# Patient Record
Sex: FEMALE | Race: WHITE | NOT HISPANIC OR LATINO | Employment: OTHER | URBAN - METROPOLITAN AREA
[De-identification: names, ages, dates, MRNs, and addresses within clinical notes are randomized per-mention and may not be internally consistent; named-entity substitution may affect disease eponyms.]

---

## 2017-10-10 ENCOUNTER — APPOINTMENT (OUTPATIENT)
Dept: OTHER | Facility: HOSPITAL | Age: 67
End: 2017-10-10
Attending: INTERNAL MEDICINE

## 2017-10-10 ENCOUNTER — APPOINTMENT (OUTPATIENT)
Dept: GENERAL RADIOLOGY | Facility: HOSPITAL | Age: 67
End: 2017-10-10

## 2017-10-10 ENCOUNTER — TRANSCRIBE ORDERS (OUTPATIENT)
Dept: PULMONOLOGY | Facility: CLINIC | Age: 67
End: 2017-10-10

## 2017-10-10 ENCOUNTER — HOSPITAL ENCOUNTER (OUTPATIENT)
Facility: HOSPITAL | Age: 67
Setting detail: SURGERY ADMIT
Discharge: HOME | End: 2017-10-10
Attending: INTERNAL MEDICINE | Admitting: INTERNAL MEDICINE

## 2017-10-10 ENCOUNTER — OFFICE VISIT (OUTPATIENT)
Dept: PULMONOLOGY | Facility: CLINIC | Age: 67
End: 2017-10-10

## 2017-10-10 VITALS
SYSTOLIC BLOOD PRESSURE: 136 MMHG | BODY MASS INDEX: 23.57 KG/M2 | WEIGHT: 133 LBS | HEART RATE: 60 BPM | DIASTOLIC BLOOD PRESSURE: 73 MMHG | TEMPERATURE: 97.6 F | OXYGEN SATURATION: 99 % | RESPIRATION RATE: 16 BRPM | HEIGHT: 63 IN

## 2017-10-10 VITALS
HEIGHT: 64 IN | RESPIRATION RATE: 16 BRPM | SYSTOLIC BLOOD PRESSURE: 110 MMHG | DIASTOLIC BLOOD PRESSURE: 80 MMHG | HEART RATE: 69 BPM | OXYGEN SATURATION: 96 % | BODY MASS INDEX: 22.84 KG/M2 | TEMPERATURE: 98.2 F | WEIGHT: 133.8 LBS

## 2017-10-10 DIAGNOSIS — R04.2 HEMOPTYSIS: Primary | ICD-10-CM

## 2017-10-10 DIAGNOSIS — J43.2 CENTRILOBULAR EMPHYSEMA (HCC): Primary | ICD-10-CM

## 2017-10-10 DIAGNOSIS — R04.2 HEMOPTYSIS: ICD-10-CM

## 2017-10-10 DIAGNOSIS — J43.2 CENTRILOBULAR EMPHYSEMA (HCC): ICD-10-CM

## 2017-10-10 PROCEDURE — 31624 DX BRONCHOSCOPE/LAVAGE: CPT | Performed by: INTERNAL MEDICINE

## 2017-10-10 PROCEDURE — 25010000002 FENTANYL CITRATE (PF) 100 MCG/2ML SOLUTION: Performed by: INTERNAL MEDICINE

## 2017-10-10 PROCEDURE — 88112 CYTOPATH CELL ENHANCE TECH: CPT | Performed by: INTERNAL MEDICINE

## 2017-10-10 PROCEDURE — 87070 CULTURE OTHR SPECIMN AEROBIC: CPT | Performed by: INTERNAL MEDICINE

## 2017-10-10 PROCEDURE — 25010000002 MIDAZOLAM PER 1 MG: Performed by: INTERNAL MEDICINE

## 2017-10-10 PROCEDURE — 87449 NOS EACH ORGANISM AG IA: CPT | Performed by: INTERNAL MEDICINE

## 2017-10-10 PROCEDURE — 87205 SMEAR GRAM STAIN: CPT | Performed by: INTERNAL MEDICINE

## 2017-10-10 PROCEDURE — 88305 TISSUE EXAM BY PATHOLOGIST: CPT | Performed by: INTERNAL MEDICINE

## 2017-10-10 PROCEDURE — 87102 FUNGUS ISOLATION CULTURE: CPT | Performed by: INTERNAL MEDICINE

## 2017-10-10 PROCEDURE — 87206 SMEAR FLUORESCENT/ACID STAI: CPT | Performed by: INTERNAL MEDICINE

## 2017-10-10 PROCEDURE — 87116 MYCOBACTERIA CULTURE: CPT | Performed by: INTERNAL MEDICINE

## 2017-10-10 PROCEDURE — 99204 OFFICE O/P NEW MOD 45 MIN: CPT | Performed by: INTERNAL MEDICINE

## 2017-10-10 RX ORDER — MIDAZOLAM HYDROCHLORIDE 5 MG/ML
INJECTION INTRAMUSCULAR; INTRAVENOUS AS NEEDED
Status: COMPLETED | OUTPATIENT
Start: 2017-10-10 | End: 2017-10-10

## 2017-10-10 RX ORDER — TIZANIDINE HYDROCHLORIDE 2 MG/1
2 CAPSULE, GELATIN COATED ORAL 3 TIMES DAILY
Status: ON HOLD | COMMUNITY
End: 2017-10-10

## 2017-10-10 RX ORDER — DOXEPIN HYDROCHLORIDE 50 MG/1
50 CAPSULE ORAL NIGHTLY
Status: ON HOLD | COMMUNITY
End: 2017-10-10

## 2017-10-10 RX ORDER — DOXYCYCLINE HYCLATE 100 MG/1
100 CAPSULE ORAL DAILY
COMMUNITY
End: 2019-08-29

## 2017-10-10 RX ORDER — TELMISARTAN 40 MG/1
40 TABLET ORAL DAILY
COMMUNITY
Start: 2017-08-28

## 2017-10-10 RX ORDER — LEVOTHYROXINE SODIUM 100 MCG
100 TABLET ORAL DAILY
COMMUNITY
Start: 2017-07-16

## 2017-10-10 RX ORDER — TRAZODONE HYDROCHLORIDE 50 MG/1
50 TABLET ORAL AS NEEDED
COMMUNITY
Start: 2017-09-12

## 2017-10-10 RX ORDER — FENTANYL CITRATE 50 UG/ML
INJECTION, SOLUTION INTRAMUSCULAR; INTRAVENOUS AS NEEDED
Status: COMPLETED | OUTPATIENT
Start: 2017-10-10 | End: 2017-10-10

## 2017-10-10 RX ORDER — ERGOCALCIFEROL 1.25 MG/1
50000 CAPSULE ORAL WEEKLY
COMMUNITY

## 2017-10-10 RX ORDER — CYCLOSPORINE 0.5 MG/ML
1 EMULSION OPHTHALMIC EVERY 12 HOURS
COMMUNITY
Start: 2017-09-22 | End: 2018-06-21 | Stop reason: SINTOL

## 2017-10-10 RX ORDER — CONJUGATED ESTROGENS 0.62 MG/G
CREAM VAGINAL
COMMUNITY
Start: 2017-10-04

## 2017-10-10 RX ORDER — ONDANSETRON 4 MG/1
4 TABLET, FILM COATED ORAL EVERY 8 HOURS PRN
Status: ON HOLD | COMMUNITY
End: 2017-10-10

## 2017-10-10 RX ORDER — MONTELUKAST SODIUM 10 MG/1
TABLET ORAL
Status: ON HOLD | COMMUNITY
Start: 2017-08-28 | End: 2017-10-10

## 2017-10-10 RX ORDER — VALACYCLOVIR HYDROCHLORIDE 1 G/1
1000 TABLET, FILM COATED ORAL 2 TIMES DAILY
COMMUNITY

## 2017-10-10 RX ORDER — MECLIZINE HCL 12.5 MG/1
12.5 TABLET ORAL AS NEEDED
COMMUNITY

## 2017-10-10 RX ORDER — LIDOCAINE 50 MG/G
1 PATCH TOPICAL EVERY 24 HOURS
Status: ON HOLD | COMMUNITY
End: 2017-10-10

## 2017-10-10 RX ORDER — PANTOPRAZOLE SODIUM 40 MG/1
40 TABLET, DELAYED RELEASE ORAL DAILY
COMMUNITY

## 2017-10-10 RX ADMIN — MIDAZOLAM HYDROCHLORIDE 1 MG: 5 INJECTION, SOLUTION INTRAMUSCULAR; INTRAVENOUS at 15:03

## 2017-10-10 RX ADMIN — FENTANYL CITRATE 50 MCG: 50 INJECTION, SOLUTION INTRAMUSCULAR; INTRAVENOUS at 15:04

## 2017-10-10 RX ADMIN — FENTANYL CITRATE 50 MCG: 50 INJECTION, SOLUTION INTRAMUSCULAR; INTRAVENOUS at 15:01

## 2017-10-10 RX ADMIN — MIDAZOLAM HYDROCHLORIDE 2 MG: 5 INJECTION, SOLUTION INTRAMUSCULAR; INTRAVENOUS at 14:58

## 2017-10-10 NOTE — H&P (VIEW-ONLY)
Selina Ruano is a 67 y.o. female here for evaluation of   Chief Complaint   Patient presents with   • Breathing Problem       Patient Active Problem List   Diagnosis   • Hemoptysis   • Centrilobular emphysema       Current Outpatient Prescriptions:   •  doxepin (SINEquan) 50 MG capsule, Take 50 mg by mouth Every Night., Disp: , Rfl:   •  doxycycline (VIBRAMYCIN) 100 MG capsule, Take 100 mg by mouth 2 (Two) Times a Day., Disp: , Rfl:   •  ergocalciferol (ERGOCALCIFEROL) 82583 units capsule, Take 50,000 Units by mouth 1 (One) Time Per Week., Disp: , Rfl:   •  lidocaine (LIDODERM) 5 %, Place 1 patch on the skin Daily. Remove & Discard patch within 12 hours or as directed by MD, Disp: , Rfl:   •  meclizine (ANTIVERT) 12.5 MG tablet, Take 12.5 mg by mouth As Needed for dizziness., Disp: , Rfl:   •  montelukast (SINGULAIR) 10 MG tablet, , Disp: , Rfl:   •  ondansetron (ZOFRAN) 4 MG tablet, Take 4 mg by mouth Every 8 (Eight) Hours As Needed for Nausea or Vomiting., Disp: , Rfl:   •  pantoprazole (PROTONIX) 40 MG EC tablet, Take 40 mg by mouth Daily., Disp: , Rfl:   •  PREMARIN 0.625 MG/GM vaginal cream, , Disp: , Rfl:   •  RESTASIS 0.05 % ophthalmic emulsion, , Disp: , Rfl:   •  SYNTHROID 100 MCG tablet, , Disp: , Rfl:   •  telmisartan (MICARDIS) 40 MG tablet, , Disp: , Rfl:   •  TiZANidine (ZANAFLEX) 2 MG capsule, Take 2 mg by mouth 3 (Three) Times a Day., Disp: , Rfl:   •  traZODone (DESYREL) 50 MG tablet, , Disp: , Rfl:   •  tretinoin (RETIN-A) 0.025 % cream, , Disp: , Rfl:   •  valACYclovir (VALTREX) 1000 MG tablet, Take 1,000 mg by mouth 2 (Two) Times a Day., Disp: , Rfl:       History of Present Illness    67-year-old woman former smoker who was in her usual state of health until October 3 when she felt a choking sensation while in bed. She got up coughed into a tissue walked into the bathroom and noticed that she had bright red blood in her tissue. She coughed up some more and spit it in the toilet. Each episode had  approximately 1 tablespoon of all blood and blood clots. She had approximately 5 episodes. She felt a rattle in her mid chest anteriorly and a dull ache over her mid anterior chest. She had not complained of purulent sputum or recent respiratory infection. She has never had a previous episode of hemoptysis. She denies fever or chills. She has no history of TB exposure. She has a previous history of tobacco abuse, 2 packs per day for 27 years quitting 20 years ago. She reports that she stopped because of shortness of air and hoarseness of her voice. Once she stopped smoking breathing resolved and hoarseness resolved. She has never required inhalers or supplemental oxygen. She does not use inhalers. She has been walking on a treadmill for 2 miles daily. 6 months ago she could do 3 miles and 10 years ago she was walking 5-6 miles. She does admit to a lot of sinus drainage. She denies nosebleeds. She was born and raised in [place] but moved to [place] in December 2006. Her  grew up in [place] and may move back to his home town. She went to the emergency room with hemoptysis. CTA of the chest was negative for pulmonary embolus and showed some emphysema. She denies chest trauma. Approximately 3 years ago she did see a Dr. Rey for COPD. He apparently did a PET scan that showed some axillary node disease. She saw an oncologist who told her that everything was fine and there was nothing abnormal on the scan she has never had peptic ulcer disease. She has had difficulty swallowing. Approximately one year ago she did have a barium swallow that showed reflux. She has no history of heart murmur. She did have a heart catheterization in February or March and had 1 vessel disease, 80% lesion. She was started on aspirin. She was told that this lesion was not a major branch heart catheterization was done in [facility].    Review of Systems   Constitutional: Positive for fatigue. Negative for activity change, appetite  change, chills, fever and unexpected weight change.   HENT: Positive for congestion, dental problem, postnasal drip, sneezing, sore throat and trouble swallowing. Negative for hearing loss and nosebleeds.    Eyes: Negative for visual disturbance.        Glaucoma, glasses, severe dry eyes   Respiratory: Positive for cough and chest tightness. Negative for shortness of breath and wheezing.    Cardiovascular: Positive for palpitations. Negative for chest pain and leg swelling.   Gastrointestinal: Positive for nausea. Negative for abdominal pain, blood in stool, diarrhea and vomiting.   Endocrine: Negative for cold intolerance, heat intolerance, polydipsia and polyuria.   Genitourinary: Negative for dysuria, hematuria and vaginal bleeding.   Musculoskeletal: Positive for arthralgias and neck pain.   Skin: Negative for rash.   Allergic/Immunologic: Positive for environmental allergies. Negative for food allergies.   Neurological: Positive for dizziness and headaches. Negative for seizures, weakness and numbness.   Hematological: Negative for adenopathy. Bruises/bleeds easily.   Psychiatric/Behavioral: Negative.          Past Medical History:   Diagnosis Date   • Acne    • Allergic rhinitis    • CAD (coronary artery disease)     1 vessel   • Constipation, chronic    • COPD (chronic obstructive pulmonary disease)    • DDD (degenerative disc disease), cervical    • DDD (degenerative disc disease), lumbar    • Dyslipidemia    • Fibrocystic breast changes    • Glaucoma    • Hemorrhoids    • Hypertension    • IBS (irritable bowel syndrome)    • Ocular rosacea    • Osteopenia    • Thyroid cancer    • Vertigo, benign positional    • Vitamin D deficiency      Past Surgical History:   Procedure Laterality Date   • BREAST LUMPECTOMY Right     benign   • CARDIAC CATHETERIZATION  2017    1 vessel 80%, [place]   • CERVICAL SPINE SURGERY      fusion with cage, diskectomy   • COLONOSCOPY  2015   • DENTAL PROCEDURE      root canal   •  "GLAUCOMA SURGERY      laser for closed angle glaucoma   • HEMORRHOIDECTOMY     • TONSILLECTOMY     • TOTAL THYROIDECTOMY  2001   • VAGINAL HYSTERECTOMY SALPINGO OOPHORECTOMY       Social History     Social History   • Marital status:      Spouse name: N/A   • Number of children: 2   • Years of education: N/A     Occupational History   • retired       Social History Main Topics   • Smoking status: Former Smoker     Packs/day: 2.00     Years: 27.00     Types: Cigarettes   • Smokeless tobacco: Former User   • Alcohol use 1.2 oz/week     2 Cans of beer per week   • Drug use: None   • Sexual activity: Not Asked     Other Topics Concern   • None     Social History Narrative   • None     Family History   Problem Relation Age of Onset   • Stroke Mother    • Hypertension Mother    • No Known Problems Father    • No Known Problems Sister    • Rheum arthritis Sister    • Coronary artery disease Brother      Blood pressure 110/80, pulse 69, temperature 98.2 °F (36.8 °C), resp. rate 16, height 63.5\" (161.3 cm), weight 133 lb 12.8 oz (60.7 kg), SpO2 96 %.    Physical Exam   Constitutional: She is oriented to person, place, and time. She appears well-developed and well-nourished. No distress.   HENT:   Head: Normocephalic and atraumatic.   Mouth/Throat: No oropharyngeal exudate.   Clear pnd. Grade 1 view   Eyes: EOM are normal. Pupils are equal, round, and reactive to light. No scleral icterus.   Neck: No JVD present. No tracheal deviation present. No thyromegaly present.   Limited ROM   Cardiovascular: Normal rate, regular rhythm, normal heart sounds and intact distal pulses.    No murmur heard.  Pulmonary/Chest: Effort normal. No respiratory distress.   mild end expiratory wheeze right chest. Left chest clear   Abdominal: Soft. Bowel sounds are normal. She exhibits no distension. There is no tenderness.   Musculoskeletal: She exhibits no edema.   hebereden nodules hands   Lymphadenopathy:     She has no cervical " adenopathy.   Neurological: She is alert and oriented to person, place, and time. No cranial nerve deficit.   Skin: Skin is warm and dry.   Psychiatric: She has a normal mood and affect.         PFTs: Not done secondary to hemoptysis    Radiology: CT angiogram of the chest done October 3, 2017 in [facility] was read as no evidence of pulmonary embolus. COPD and fibrosis. Apical pleural scarring was noted on the right. When I reviewed the films she has a peripheral nodule in the right mid lung and some scarring or atelectasis in the right middle lobe.    Lab: Laboratory data from the emergency room visit October 3, hemoglobin 14.7, hematocrit 42.1, 46% neutrophils, 41% lymphocytes, 3.4% eosinophils, white blood cells 4.4, platelets 185. Glucose 92, BUN 12, creatinine 0.8, sodium 142, potassium 4.3, chloride 106, bicarbonate 29, calcium 8.4.    Selina was seen today for breathing problem.    Diagnoses and all orders for this visit:    Hemoptysis    Centrilobular emphysema      Discussion: 67-year-old woman with earle hemoptysis on October 3. There was no preceding infection. A CTA of the chest was negative for pulmonary embolus. She did have some emphysema on the scan. When I reviewed the scan,  she has some right middle lobe atelectasis and a nodule in the right midlung peripherally. She clearly does not have a dominant lung mass. She could have bronchiectasis. That would not necessarily show up on the CTA of the chest, but rather on a high resolution CT scan. Could have an endobronchial carcinoid. She could even have a small endobronchial lung cancer. She certainly does not have any cavitary pneumonia to suggest TB or fungal pneumonia. She has evidence of emphysema on her scan. Oxygen saturation is 96% on room air and she can walk 2 miles on a treadmill. I suspect her disease is mild perhaps moderate but certainly not severe end-stage disease. She does not have other signs of vasculitis-like skin rash,  anemia, joint difficulties; although she does have some reflux. Perhaps scleroderma is in the differential. This however does not typically result in hemoptysis but rather interstitial lung disease and pulmonary hypertension.    Schedule diagnostic bronchoscopy, this afternoon if possible  If bronchoscopy is nondiagnostic we will obtain a high resolution CT scan of the chest  After bronchoscopy if it is nondiagnostic we will do a vasculitis screen  Follow-up for 6 weeks

## 2017-10-10 NOTE — PROCEDURES
"Bronchoscopy  Date/Time: 10/10/2017 3:23 PM  Performed by: ROSEMARIE CORONA  Authorized by: ROSEMARIE CORONA   Consent: Verbal consent obtained. Written consent obtained.  Risks and benefits: risks, benefits and alternatives were discussed  Consent given by: patient  Patient understanding: patient states understanding of the procedure being performed  Patient consent: the patient's understanding of the procedure matches consent given  Procedure consent: procedure consent matches procedure scheduled  Relevant documents: relevant documents present and verified  Imaging studies: imaging studies available  Patient identity confirmed: verbally with patient  Time out: Immediately prior to procedure a \"time out\" was called to verify the correct patient, procedure, equipment, support staff and site/side marked as required.  Local anesthesia used: yes    Anesthesia:  Local anesthesia used: yes  Local Anesthetic: lidocaine 1% without epinephrine (4% lidocaine nebulized; 1% intrabronchial)  Anesthetic total: 15 mL    Sedation:  Patient sedated: yes  Sedatives: midazolam  Analgesia: fentanyl  Vitals: Vital signs were monitored during sedation.  Patient tolerance: Patient tolerated the procedure well with no immediate complications          67-year-old woman, remote smoker, presenting with hemoptysis, approximately 5 tablespoons of bright red blood on October 8. CTA of the chest was performed and read as no pulmonary embolus or lung disease. I reviewed the films personally myself and with the radiologist. She has some subsegmental atelectasis and bronchiectasis in the right middle lobe medially as well as some nodular postinflammatory changes in the anterior segment of the right upper lobe. She also has some apical pleural scarring.  Procedure was explained with risks including bleeding, reaction to IV sedation, breathing difficulty, consent obtained. Patient was given 4% lidocaine nebulized. She was taken to the " endoscopy suite where she received a total of Versed 3 mg IV and fentanyl 100 µg IV. Bronchoscope was introduced via the right nostril after lidocaine jelly used to lubricate. Posterior nasal pharynx was a little tight but successfully advanced the bronchoscope into the supraglottic region. Local cords without lesions and with normal motion. Lidocaine 1%, 10 mL she used to anesthetize the vocal cords. Vocal cords intubated atraumatically. Trachea normal, letty sharp. Right bronchial tree right upper lobe and its 3 subsegments right middle lobe and its 2 subsegments. Basilar segments of the right lower lobe were all widely patent without any active bleeding, or blood clots or endobronchial lesions. Bronchoscope was withdrawn and the left bronchial tree inspected left upper lobe and its 2 subsegments lesion when it is 2 subsegments. Basilar segments of the left lower lobe are again widely patent without any evidence of active or old bleeding and no endobronchial masses. Patient did decrease her oxygen saturation to 86%. Bronchoscope was withdrawn to the letty and oxygen was placed in her mouth and increased with improvement to 90-94%. Bronchoscope was advanced into the right middle lobe and BAL obtained using 60 ML's of saline. Return was approximately 20 ML's. Trap was changed and bronchoscope was advanced into the anterior segment of the right upper lobe and BAL obtained with 60 ML's of saline, 25 mL she returned. Bronchoscope was withdrawn and the proximal trachea evaluated on exit without any lesions. Overall she tolerated the procedure very well. Specimens right middle lobe bronchial washing for AFB, fungal, routine cultures, cytology. Right upper lobe anterior segment washing for AFB, fungal, routine culture and cytology.

## 2017-10-10 NOTE — PROGRESS NOTES
Selina Ruano is a 67 y.o. female here for evaluation of   Chief Complaint   Patient presents with   • Breathing Problem       Patient Active Problem List   Diagnosis   • Hemoptysis   • Centrilobular emphysema       Current Outpatient Prescriptions:   •  doxepin (SINEquan) 50 MG capsule, Take 50 mg by mouth Every Night., Disp: , Rfl:   •  doxycycline (VIBRAMYCIN) 100 MG capsule, Take 100 mg by mouth 2 (Two) Times a Day., Disp: , Rfl:   •  ergocalciferol (ERGOCALCIFEROL) 59577 units capsule, Take 50,000 Units by mouth 1 (One) Time Per Week., Disp: , Rfl:   •  lidocaine (LIDODERM) 5 %, Place 1 patch on the skin Daily. Remove & Discard patch within 12 hours or as directed by MD, Disp: , Rfl:   •  meclizine (ANTIVERT) 12.5 MG tablet, Take 12.5 mg by mouth As Needed for dizziness., Disp: , Rfl:   •  montelukast (SINGULAIR) 10 MG tablet, , Disp: , Rfl:   •  ondansetron (ZOFRAN) 4 MG tablet, Take 4 mg by mouth Every 8 (Eight) Hours As Needed for Nausea or Vomiting., Disp: , Rfl:   •  pantoprazole (PROTONIX) 40 MG EC tablet, Take 40 mg by mouth Daily., Disp: , Rfl:   •  PREMARIN 0.625 MG/GM vaginal cream, , Disp: , Rfl:   •  RESTASIS 0.05 % ophthalmic emulsion, , Disp: , Rfl:   •  SYNTHROID 100 MCG tablet, , Disp: , Rfl:   •  telmisartan (MICARDIS) 40 MG tablet, , Disp: , Rfl:   •  TiZANidine (ZANAFLEX) 2 MG capsule, Take 2 mg by mouth 3 (Three) Times a Day., Disp: , Rfl:   •  traZODone (DESYREL) 50 MG tablet, , Disp: , Rfl:   •  tretinoin (RETIN-A) 0.025 % cream, , Disp: , Rfl:   •  valACYclovir (VALTREX) 1000 MG tablet, Take 1,000 mg by mouth 2 (Two) Times a Day., Disp: , Rfl:       History of Present Illness    67-year-old woman former smoker who was in her usual state of health until October 3 when she felt a choking sensation while in bed. She got up coughed into a tissue walked into the bathroom and noticed that she had bright red blood in her tissue. She coughed up some more and spit it in the toilet. Each episode had  approximately 1 tablespoon of all blood and blood clots. She had approximately 5 episodes. She felt a rattle in her mid chest anteriorly and a dull ache over her mid anterior chest. She had not complained of purulent sputum or recent respiratory infection. She has never had a previous episode of hemoptysis. She denies fever or chills. She has no history of TB exposure. She has a previous history of tobacco abuse, 2 packs per day for 27 years quitting 20 years ago. She reports that she stopped because of shortness of air and hoarseness of her voice. Once she stopped smoking breathing resolved and hoarseness resolved. She has never required inhalers or supplemental oxygen. She does not use inhalers. She has been walking on a treadmill for 2 miles daily. 6 months ago she could do 3 miles and 10 years ago she was walking 5-6 miles. She does admit to a lot of sinus drainage. She denies nosebleeds. She was born and raised in [place] but moved to [place] in December 2006. Her  grew up in [place] and may move back to his home town. She went to the emergency room with hemoptysis. CTA of the chest was negative for pulmonary embolus and showed some emphysema. She denies chest trauma. Approximately 3 years ago she did see a Dr. Rey for COPD. He apparently did a PET scan that showed some axillary node disease. She saw an oncologist who told her that everything was fine and there was nothing abnormal on the scan she has never had peptic ulcer disease. She has had difficulty swallowing. Approximately one year ago she did have a barium swallow that showed reflux. She has no history of heart murmur. She did have a heart catheterization in February or March and had 1 vessel disease, 80% lesion. She was started on aspirin. She was told that this lesion was not a major branch heart catheterization was done in [facility].    Review of Systems   Constitutional: Positive for fatigue. Negative for activity change, appetite  change, chills, fever and unexpected weight change.   HENT: Positive for congestion, dental problem, postnasal drip, sneezing, sore throat and trouble swallowing. Negative for hearing loss and nosebleeds.    Eyes: Negative for visual disturbance.        Glaucoma, glasses, severe dry eyes   Respiratory: Positive for cough and chest tightness. Negative for shortness of breath and wheezing.    Cardiovascular: Positive for palpitations. Negative for chest pain and leg swelling.   Gastrointestinal: Positive for nausea. Negative for abdominal pain, blood in stool, diarrhea and vomiting.   Endocrine: Negative for cold intolerance, heat intolerance, polydipsia and polyuria.   Genitourinary: Negative for dysuria, hematuria and vaginal bleeding.   Musculoskeletal: Positive for arthralgias and neck pain.   Skin: Negative for rash.   Allergic/Immunologic: Positive for environmental allergies. Negative for food allergies.   Neurological: Positive for dizziness and headaches. Negative for seizures, weakness and numbness.   Hematological: Negative for adenopathy. Bruises/bleeds easily.   Psychiatric/Behavioral: Negative.          Past Medical History:   Diagnosis Date   • Acne    • Allergic rhinitis    • CAD (coronary artery disease)     1 vessel   • Constipation, chronic    • COPD (chronic obstructive pulmonary disease)    • DDD (degenerative disc disease), cervical    • DDD (degenerative disc disease), lumbar    • Dyslipidemia    • Fibrocystic breast changes    • Glaucoma    • Hemorrhoids    • Hypertension    • IBS (irritable bowel syndrome)    • Ocular rosacea    • Osteopenia    • Thyroid cancer    • Vertigo, benign positional    • Vitamin D deficiency      Past Surgical History:   Procedure Laterality Date   • BREAST LUMPECTOMY Right     benign   • CARDIAC CATHETERIZATION  2017    1 vessel 80%, [place]   • CERVICAL SPINE SURGERY      fusion with cage, diskectomy   • COLONOSCOPY  2015   • DENTAL PROCEDURE      root canal   •  "GLAUCOMA SURGERY      laser for closed angle glaucoma   • HEMORRHOIDECTOMY     • TONSILLECTOMY     • TOTAL THYROIDECTOMY  2001   • VAGINAL HYSTERECTOMY SALPINGO OOPHORECTOMY       Social History     Social History   • Marital status:      Spouse name: N/A   • Number of children: 2   • Years of education: N/A     Occupational History   • retired       Social History Main Topics   • Smoking status: Former Smoker     Packs/day: 2.00     Years: 27.00     Types: Cigarettes   • Smokeless tobacco: Former User   • Alcohol use 1.2 oz/week     2 Cans of beer per week   • Drug use: None   • Sexual activity: Not Asked     Other Topics Concern   • None     Social History Narrative   • None     Family History   Problem Relation Age of Onset   • Stroke Mother    • Hypertension Mother    • No Known Problems Father    • No Known Problems Sister    • Rheum arthritis Sister    • Coronary artery disease Brother      Blood pressure 110/80, pulse 69, temperature 98.2 °F (36.8 °C), resp. rate 16, height 63.5\" (161.3 cm), weight 133 lb 12.8 oz (60.7 kg), SpO2 96 %.    Physical Exam   Constitutional: She is oriented to person, place, and time. She appears well-developed and well-nourished. No distress.   HENT:   Head: Normocephalic and atraumatic.   Mouth/Throat: No oropharyngeal exudate.   Clear pnd. Grade 1 view   Eyes: EOM are normal. Pupils are equal, round, and reactive to light. No scleral icterus.   Neck: No JVD present. No tracheal deviation present. No thyromegaly present.   Limited ROM   Cardiovascular: Normal rate, regular rhythm, normal heart sounds and intact distal pulses.    No murmur heard.  Pulmonary/Chest: Effort normal. No respiratory distress.   mild end expiratory wheeze right chest. Left chest clear   Abdominal: Soft. Bowel sounds are normal. She exhibits no distension. There is no tenderness.   Musculoskeletal: She exhibits no edema.   hebereden nodules hands   Lymphadenopathy:     She has no cervical " adenopathy.   Neurological: She is alert and oriented to person, place, and time. No cranial nerve deficit.   Skin: Skin is warm and dry.   Psychiatric: She has a normal mood and affect.         PFTs: Not done secondary to hemoptysis    Radiology: CT angiogram of the chest done October 3, 2017 in [facility] was read as no evidence of pulmonary embolus. COPD and fibrosis. Apical pleural scarring was noted on the right. When I reviewed the films she has a peripheral nodule in the right mid lung and some scarring or atelectasis in the right middle lobe.    Lab: Laboratory data from the emergency room visit October 3, hemoglobin 14.7, hematocrit 42.1, 46% neutrophils, 41% lymphocytes, 3.4% eosinophils, white blood cells 4.4, platelets 185. Glucose 92, BUN 12, creatinine 0.8, sodium 142, potassium 4.3, chloride 106, bicarbonate 29, calcium 8.4.    Selina was seen today for breathing problem.    Diagnoses and all orders for this visit:    Hemoptysis    Centrilobular emphysema      Discussion: 67-year-old woman with earle hemoptysis on October 3. There was no preceding infection. A CTA of the chest was negative for pulmonary embolus. She did have some emphysema on the scan. When I reviewed the scan,  she has some right middle lobe atelectasis and a nodule in the right midlung peripherally. She clearly does not have a dominant lung mass. She could have bronchiectasis. That would not necessarily show up on the CTA of the chest, but rather on a high resolution CT scan. Could have an endobronchial carcinoid. She could even have a small endobronchial lung cancer. She certainly does not have any cavitary pneumonia to suggest TB or fungal pneumonia. She has evidence of emphysema on her scan. Oxygen saturation is 96% on room air and she can walk 2 miles on a treadmill. I suspect her disease is mild perhaps moderate but certainly not severe end-stage disease. She does not have other signs of vasculitis-like skin rash,  anemia, joint difficulties; although she does have some reflux. Perhaps scleroderma is in the differential. This however does not typically result in hemoptysis but rather interstitial lung disease and pulmonary hypertension.    Schedule diagnostic bronchoscopy, this afternoon if possible  If bronchoscopy is nondiagnostic we will obtain a high resolution CT scan of the chest  After bronchoscopy if it is nondiagnostic we will do a vasculitis screen  Follow-up for 6 weeks

## 2017-10-10 NOTE — DISCHARGE INSTRUCTIONS
Call for Shortness of air,hemoptysis  Schedule followup with me in 6 weeks- Cyndi at office should call you to schedule CT scan and follow up on same day for 6 weeks.

## 2017-10-11 LAB
LAB AP CASE REPORT: NORMAL
Lab: NORMAL
PATH REPORT.FINAL DX SPEC: NORMAL

## 2017-10-12 LAB
BACTERIA SPEC RESP CULT: NORMAL
GIE STN SPEC: NORMAL
GRAM STN SPEC: NORMAL
H CAPSUL AG UR-MCNC: <0.5 NG/ML
Lab: NORMAL

## 2017-10-13 LAB
MVISTA(R) BLASTOMYCES AG: NORMAL NG/ML
SPECIMEN SOURCE: NORMAL

## 2017-10-17 DIAGNOSIS — R04.2 HEMOPTYSIS: Primary | ICD-10-CM

## 2017-11-21 LAB
FUNGUS WND CULT: NORMAL
FUNGUS WND CULT: NORMAL
MYCOBACTERIUM SPEC CULT: ABNORMAL
MYCOBACTERIUM SPEC CULT: ABNORMAL
NIGHT BLUE STAIN TISS: ABNORMAL

## 2017-11-22 LAB
MYCOBACTERIUM SPEC CULT: NORMAL
MYCOBACTERIUM SPEC CULT: NORMAL
NIGHT BLUE STAIN TISS: NORMAL

## 2017-11-29 DIAGNOSIS — R04.2 HEMOPTYSIS: Primary | ICD-10-CM

## 2017-12-06 ENCOUNTER — HOSPITAL ENCOUNTER (OUTPATIENT)
Dept: CT IMAGING | Facility: HOSPITAL | Age: 67
Discharge: HOME | End: 2017-12-06
Admitting: NURSE PRACTITIONER

## 2017-12-06 ENCOUNTER — OFFICE VISIT (OUTPATIENT)
Dept: PULMONOLOGY | Facility: CLINIC | Age: 67
End: 2017-12-06

## 2017-12-06 VITALS
HEIGHT: 63 IN | SYSTOLIC BLOOD PRESSURE: 132 MMHG | RESPIRATION RATE: 16 BRPM | HEART RATE: 82 BPM | DIASTOLIC BLOOD PRESSURE: 78 MMHG | BODY MASS INDEX: 23.96 KG/M2 | TEMPERATURE: 97.8 F | WEIGHT: 135.2 LBS | OXYGEN SATURATION: 98 %

## 2017-12-06 DIAGNOSIS — A31.0 MYCOBACTERIUM AVIUM-INTRACELLULARE INFECTION (HCC): Primary | ICD-10-CM

## 2017-12-06 DIAGNOSIS — J43.2 CENTRILOBULAR EMPHYSEMA (HCC): ICD-10-CM

## 2017-12-06 DIAGNOSIS — R04.2 HEMOPTYSIS: ICD-10-CM

## 2017-12-06 PROCEDURE — 99213 OFFICE O/P EST LOW 20 MIN: CPT | Performed by: INTERNAL MEDICINE

## 2017-12-06 PROCEDURE — 71250 CT THORAX DX C-: CPT

## 2017-12-06 RX ORDER — ATORVASTATIN CALCIUM 10 MG/1
10 TABLET, FILM COATED ORAL DAILY
COMMUNITY
Start: 2017-12-04

## 2017-12-06 RX ORDER — HYDROCHLOROTHIAZIDE 12.5 MG/1
12.5 CAPSULE, GELATIN COATED ORAL EVERY MORNING
COMMUNITY
Start: 2017-12-04 | End: 2018-06-21 | Stop reason: ALTCHOICE

## 2017-12-06 RX ORDER — ALBUTEROL SULFATE 90 UG/1
2 AEROSOL, METERED RESPIRATORY (INHALATION) EVERY 6 HOURS PRN
Status: DISCONTINUED | OUTPATIENT
Start: 2017-12-06 | End: 2017-12-07

## 2017-12-06 NOTE — PROGRESS NOTES
Selina Ruano is a 67 y.o. female here for evaluation of   Chief Complaint   Patient presents with   • Results     Pt had CT scan this morning. Here for results.       Patient Active Problem List   Diagnosis   • Hemoptysis   • Centrilobular emphysema   • Mycobacterium avium-intracellulare infection       Current Outpatient Prescriptions:   •  atorvastatin (LIPITOR) 10 MG tablet, , Disp: , Rfl:   •  doxycycline (VIBRAMYCIN) 100 MG capsule, Take 100 mg by mouth Daily., Disp: , Rfl:   •  ergocalciferol (ERGOCALCIFEROL) 63648 units capsule, Take 50,000 Units by mouth 1 (One) Time Per Week., Disp: , Rfl:   •  hydrochlorothiazide (MICROZIDE) 12.5 MG capsule, , Disp: , Rfl:   •  meclizine (ANTIVERT) 12.5 MG tablet, Take 12.5 mg by mouth As Needed for dizziness., Disp: , Rfl:   •  pantoprazole (PROTONIX) 40 MG EC tablet, Take 40 mg by mouth Daily., Disp: , Rfl:   •  PREMARIN 0.625 MG/GM vaginal cream, , Disp: , Rfl:   •  RESTASIS 0.05 % ophthalmic emulsion, , Disp: , Rfl:   •  SYNTHROID 100 MCG tablet, , Disp: , Rfl:   •  telmisartan (MICARDIS) 40 MG tablet, , Disp: , Rfl:   •  traZODone (DESYREL) 50 MG tablet, Every Night., Disp: , Rfl:   •  tretinoin (RETIN-A) 0.025 % cream, , Disp: , Rfl:   •  valACYclovir (VALTREX) 1000 MG tablet, Take 1,000 mg by mouth 2 (Two) Times a Day., Disp: , Rfl:     Current Facility-Administered Medications:   •  albuterol (PROVENTIL HFA;VENTOLIN HFA) inhaler 2 puff, 2 puff, Inhalation, Q6H PRN, Mily Cordon MD      History of Present Illness    67-year-old woman, previous smoker with a chronic cough and right middle lobe infiltrate. Bronchoscopy revealed no endobronchial lesions. Cultures are now positive for Mycobacterium avium complex. She continues to have persistent cough productive of sputum. She denies further hemoptysis. She denies weight loss or fever but does have night sweats. She admits to wheezing with exertion and with cold air exposure. She is currently on no  inhalers.    Review of Systems   Constitutional: Positive for diaphoresis and fatigue.   Respiratory: Positive for cough and shortness of breath.    Cardiovascular: Negative for chest pain.         Past Medical History:   Diagnosis Date   • Acne    • Allergic rhinitis    • CAD (coronary artery disease)     1 vessel   • Constipation, chronic    • COPD (chronic obstructive pulmonary disease)    • DDD (degenerative disc disease), cervical    • DDD (degenerative disc disease), lumbar    • Dyslipidemia    • Fibrocystic breast changes    • Glaucoma    • Hemorrhoids    • Hypertension    • IBS (irritable bowel syndrome)    • Ocular rosacea    • Osteopenia    • Thyroid cancer    • Vertigo, benign positional    • Vitamin D deficiency      Past Surgical History:   Procedure Laterality Date   • BREAST LUMPECTOMY Right     benign   • BRONCHOSCOPY N/A 10/10/2017    Procedure: BRONCHOSCOPY BIOPSY WITH FLUORO;  Surgeon: Mily Cordon MD;  Location: [facility] ENDOSCOPY;  Service:    • CARDIAC CATHETERIZATION  2017    1 vessel 80%, [place]   • CERVICAL SPINE SURGERY      fusion with cage, diskectomy   • COLONOSCOPY  2015   • DENTAL PROCEDURE      root canal   • GLAUCOMA SURGERY      laser for closed angle glaucoma   • HEMORRHOIDECTOMY     • TONSILLECTOMY     • TOTAL THYROIDECTOMY  2001   • VAGINAL HYSTERECTOMY SALPINGO OOPHORECTOMY       Social History     Social History   • Marital status:      Spouse name: N/A   • Number of children: 2   • Years of education: N/A     Occupational History   • retired       Social History Main Topics   • Smoking status: Former Smoker     Packs/day: 2.00     Years: 27.00     Types: Cigarettes   • Smokeless tobacco: Former User   • Alcohol use 1.2 oz/week     2 Cans of beer per week   • Drug use: None   • Sexual activity: Not Asked     Other Topics Concern   • None     Social History Narrative     Family History   Problem Relation Age of Onset   • Stroke Mother    • Hypertension  "Mother    • No Known Problems Father    • No Known Problems Sister    • Rheum arthritis Sister    • Coronary artery disease Brother      Blood pressure 132/78, pulse 82, temperature 97.8 °F (36.6 °C), temperature source Temporal Artery , resp. rate 16, height 160 cm (63\"), weight 61.3 kg (135 lb 3.2 oz), SpO2 98 %.    Physical Exam   Constitutional: She appears well-developed and well-nourished. No distress.   HENT:   Head: Normocephalic and atraumatic.   Mouth/Throat: Oropharynx is clear and moist. No oropharyngeal exudate.   Eyes: No scleral icterus.   Neck: No thyromegaly present.   Cardiovascular: Normal rate, regular rhythm and normal heart sounds.    No murmur heard.  Pulmonary/Chest: Effort normal. No respiratory distress.   Faint inspiratory crackles right lateral chest wall   Musculoskeletal: She exhibits no edema.   Lymphadenopathy:     She has cervical adenopathy.   Skin: Skin is warm and dry.   Psychiatric: She has a normal mood and affect.         PFTs:    Radiology:      IMPRESSION:  1.  Multiple parenchymal upper lobe nodules with fibronodular scarring  at the apices and nodules through the upper lobes with pleural based  nodules diffusely, most severe in the upper lobes.  2.  Mild upper and lower lung bronchiectasis is identified as described  with obstructive lung disease.  3.  There is mild diffuse septal thickening and fibrosis of the  secondary perimeter pulmonary lobular septation.  4.  There is considerable pleural nodularity and pleural plaque disease  which is noncalcified.  5.  Otherwise, there is no groundglass disease, consolidative disease or  pleural effusion and there is no bullous disease or cystic lung disease.  Nonetheless, substantial chronic abnormalities are identified, most  severe in the upper lung zones but noted globally.  6.  Incidental note is made of a rounded low attenuation in the  posterior right lobe of the liver for which a cyst is favored.      D:  " 12/06/2017  Lab:    Selina was seen today for results.    Diagnoses and all orders for this visit:    Mycobacterium avium-intracellulare infection    Centrilobular emphysema  -     albuterol (PROVENTIL HFA;VENTOLIN HFA) inhaler 2 puff; Inhale 2 puffs Every 6 (Six) Hours As Needed for Wheezing.      Discussion:   67-year-old woman, former smoker with some emphysema and chronic cough. She has a persistent right middle lobe infiltrate and some bronchiectasis changes. Bronchoscopy revealed no endobronchial lesions and cultures are now positive for Mycobacterium avium complex. With her nodular changes in the upper lobes and her chronic right middle lobe infiltrate and to feel that she may have a chronic infection. I would like to refer her to infectious disease for treatment. She would like to see a physician closer to home if possible. She resides in [place]. She does go to an ophthalmologist in [place]. We will attempt to arrange treatment in [place]. She does have significant eye difficulties and I am a little concerned about ethambutol. Her ophthalmologist is in [place]. She does have intermittent wheezing and does not use any inhalers. I have prescribed an albuterol inhaler to use 2 puffs as needed for tightness or wheezing. She was given instruction by the respiratory therapist on how to use an HFA inhaler.

## 2017-12-07 ENCOUNTER — TRANSCRIBE ORDERS (OUTPATIENT)
Dept: PULMONOLOGY | Facility: CLINIC | Age: 67
End: 2017-12-07

## 2017-12-07 DIAGNOSIS — A31.0 MYCOBACTERIUM AVIUM-INTRACELLULARE INFECTION (HCC): Primary | ICD-10-CM

## 2017-12-07 RX ORDER — ALBUTEROL SULFATE 90 UG/1
2 AEROSOL, METERED RESPIRATORY (INHALATION) EVERY 4 HOURS PRN
Qty: 1 INHALER | Refills: 11 | Status: SHIPPED | OUTPATIENT
Start: 2017-12-07 | End: 2017-12-07 | Stop reason: SDUPTHER

## 2017-12-07 RX ORDER — ALBUTEROL SULFATE 90 UG/1
2 AEROSOL, METERED RESPIRATORY (INHALATION) EVERY 4 HOURS PRN
Qty: 3 INHALER | Refills: 3 | Status: SHIPPED | OUTPATIENT
Start: 2017-12-07

## 2018-01-25 ENCOUNTER — LAB (OUTPATIENT)
Dept: LAB | Facility: HOSPITAL | Age: 68
End: 2018-01-25

## 2018-01-25 ENCOUNTER — LAB REQUISITION (OUTPATIENT)
Dept: LAB | Facility: HOSPITAL | Age: 68
End: 2018-01-25

## 2018-01-25 DIAGNOSIS — R04.2 HEMOPTYSIS: ICD-10-CM

## 2018-01-25 DIAGNOSIS — J43.2 CENTRILOBULAR EMPHYSEMA (HCC): ICD-10-CM

## 2018-01-25 DIAGNOSIS — Z00.00 ROUTINE GENERAL MEDICAL EXAMINATION AT A HEALTH CARE FACILITY: ICD-10-CM

## 2018-01-25 DIAGNOSIS — A31.0 PULMONARY MYCOBACTERIUM AVIUM COMPLEX (MAC) INFECTION (HCC): Primary | ICD-10-CM

## 2018-01-25 PROCEDURE — 36415 COLL VENOUS BLD VENIPUNCTURE: CPT | Performed by: INTERNAL MEDICINE

## 2018-01-25 PROCEDURE — 87181 SC STD AGAR DILUTION PER AGT: CPT

## 2018-01-25 PROCEDURE — 87186 SC STD MICRODIL/AGAR DIL: CPT

## 2018-02-20 ENCOUNTER — LAB (OUTPATIENT)
Dept: LAB | Facility: HOSPITAL | Age: 68
End: 2018-02-20

## 2018-02-20 ENCOUNTER — TRANSCRIBE ORDERS (OUTPATIENT)
Dept: LAB | Facility: HOSPITAL | Age: 68
End: 2018-02-20

## 2018-02-20 DIAGNOSIS — J43.2 CENTRIACINAR EMPHYSEMA (HCC): ICD-10-CM

## 2018-02-20 DIAGNOSIS — A31.0 PULMONARY DISEASE DUE TO MYCOBACTERIA (HCC): Primary | ICD-10-CM

## 2018-02-20 DIAGNOSIS — R04.2 COUGHING UP BLOOD: ICD-10-CM

## 2018-02-20 DIAGNOSIS — A31.0 PULMONARY DISEASE DUE TO MYCOBACTERIA (HCC): ICD-10-CM

## 2018-02-20 LAB
ALBUMIN SERPL-MCNC: 4.6 G/DL (ref 3.2–4.8)
ALBUMIN/GLOB SERPL: 2.3 G/DL (ref 1.5–2.5)
ALP SERPL-CCNC: 70 U/L (ref 25–100)
ALT SERPL W P-5'-P-CCNC: 20 U/L (ref 7–40)
ANION GAP SERPL CALCULATED.3IONS-SCNC: 9 MMOL/L (ref 3–11)
AST SERPL-CCNC: 25 U/L (ref 0–33)
BASOPHILS # BLD AUTO: 0.03 10*3/MM3 (ref 0–0.2)
BASOPHILS NFR BLD AUTO: 0.5 % (ref 0–1)
BILIRUB SERPL-MCNC: 0.7 MG/DL (ref 0.3–1.2)
BUN BLD-MCNC: 10 MG/DL (ref 9–23)
BUN/CREAT SERPL: 16.7 (ref 7–25)
CALCIUM SPEC-SCNC: 8.9 MG/DL (ref 8.7–10.4)
CHLORIDE SERPL-SCNC: 99 MMOL/L (ref 99–109)
CO2 SERPL-SCNC: 29 MMOL/L (ref 20–31)
CREAT BLD-MCNC: 0.6 MG/DL (ref 0.6–1.3)
DEPRECATED RDW RBC AUTO: 46.6 FL (ref 37–54)
EOSINOPHIL # BLD AUTO: 0.07 10*3/MM3 (ref 0–0.3)
EOSINOPHIL NFR BLD AUTO: 1.2 % (ref 0–3)
ERYTHROCYTE [DISTWIDTH] IN BLOOD BY AUTOMATED COUNT: 13.2 % (ref 11.3–14.5)
GFR SERPL CREATININE-BSD FRML MDRD: 99 ML/MIN/1.73
GLOBULIN UR ELPH-MCNC: 2 GM/DL
GLUCOSE BLD-MCNC: 78 MG/DL (ref 70–100)
HCT VFR BLD AUTO: 42 % (ref 34.5–44)
HGB BLD-MCNC: 14 G/DL (ref 11.5–15.5)
IMM GRANULOCYTES # BLD: 0.01 10*3/MM3 (ref 0–0.03)
IMM GRANULOCYTES NFR BLD: 0.2 % (ref 0–0.6)
LYMPHOCYTES # BLD AUTO: 2.07 10*3/MM3 (ref 0.6–4.8)
LYMPHOCYTES NFR BLD AUTO: 36.4 % (ref 24–44)
MCH RBC QN AUTO: 32 PG (ref 27–31)
MCHC RBC AUTO-ENTMCNC: 33.3 G/DL (ref 32–36)
MCV RBC AUTO: 96.1 FL (ref 80–99)
MONOCYTES # BLD AUTO: 0.45 10*3/MM3 (ref 0–1)
MONOCYTES NFR BLD AUTO: 7.9 % (ref 0–12)
NEUTROPHILS # BLD AUTO: 3.05 10*3/MM3 (ref 1.5–8.3)
NEUTROPHILS NFR BLD AUTO: 53.8 % (ref 41–71)
PLATELET # BLD AUTO: 242 10*3/MM3 (ref 150–450)
PMV BLD AUTO: 9.8 FL (ref 6–12)
POTASSIUM BLD-SCNC: 3.8 MMOL/L (ref 3.5–5.5)
PROT SERPL-MCNC: 6.6 G/DL (ref 5.7–8.2)
RBC # BLD AUTO: 4.37 10*6/MM3 (ref 3.89–5.14)
SODIUM BLD-SCNC: 137 MMOL/L (ref 132–146)
WBC NRBC COR # BLD: 5.68 10*3/MM3 (ref 3.5–10.8)

## 2018-02-20 PROCEDURE — 36415 COLL VENOUS BLD VENIPUNCTURE: CPT | Performed by: INTERNAL MEDICINE

## 2018-02-20 PROCEDURE — 80053 COMPREHEN METABOLIC PANEL: CPT | Performed by: INTERNAL MEDICINE

## 2018-02-20 PROCEDURE — 85025 COMPLETE CBC W/AUTO DIFF WBC: CPT

## 2018-04-11 LAB — REF LAB TEST RESULTS: NORMAL

## 2018-05-24 ENCOUNTER — TRANSCRIBE ORDERS (OUTPATIENT)
Dept: LAB | Facility: HOSPITAL | Age: 68
End: 2018-05-24

## 2018-05-24 ENCOUNTER — TRANSCRIBE ORDERS (OUTPATIENT)
Dept: ADMINISTRATIVE | Facility: HOSPITAL | Age: 68
End: 2018-05-24

## 2018-05-24 ENCOUNTER — APPOINTMENT (OUTPATIENT)
Dept: LAB | Facility: HOSPITAL | Age: 68
End: 2018-05-24

## 2018-05-24 DIAGNOSIS — R04.2 HEMOPTYSIS: ICD-10-CM

## 2018-05-24 DIAGNOSIS — A31.0 PULMONARY DISEASE DUE TO MYCOBACTERIA (HCC): ICD-10-CM

## 2018-05-24 DIAGNOSIS — Z88.9 ALLERGY, DRUG: Primary | ICD-10-CM

## 2018-05-24 DIAGNOSIS — A31.0 PULMONARY MYCOBACTERIUM AVIUM COMPLEX (MAC) INFECTION (HCC): Primary | ICD-10-CM

## 2018-05-24 LAB
ALBUMIN SERPL-MCNC: 4.4 G/DL (ref 3.2–4.8)
ALBUMIN/GLOB SERPL: 2 G/DL (ref 1.5–2.5)
ALP SERPL-CCNC: 63 U/L (ref 25–100)
ALT SERPL W P-5'-P-CCNC: 42 U/L (ref 7–40)
ANION GAP SERPL CALCULATED.3IONS-SCNC: 6 MMOL/L (ref 3–11)
AST SERPL-CCNC: 33 U/L (ref 0–33)
BASOPHILS # BLD AUTO: 0.02 10*3/MM3 (ref 0–0.2)
BASOPHILS NFR BLD AUTO: 0.7 % (ref 0–1)
BILIRUB SERPL-MCNC: 0.4 MG/DL (ref 0.3–1.2)
BUN BLD-MCNC: 9 MG/DL (ref 9–23)
BUN/CREAT SERPL: 12.9 (ref 7–25)
CALCIUM SPEC-SCNC: 8.6 MG/DL (ref 8.7–10.4)
CHLORIDE SERPL-SCNC: 103 MMOL/L (ref 99–109)
CO2 SERPL-SCNC: 30 MMOL/L (ref 20–31)
CREAT BLD-MCNC: 0.7 MG/DL (ref 0.6–1.3)
DEPRECATED RDW RBC AUTO: 46.6 FL (ref 37–54)
EOSINOPHIL # BLD AUTO: 0.09 10*3/MM3 (ref 0–0.3)
EOSINOPHIL NFR BLD AUTO: 3.2 % (ref 0–3)
ERYTHROCYTE [DISTWIDTH] IN BLOOD BY AUTOMATED COUNT: 13.5 % (ref 11.3–14.5)
GFR SERPL CREATININE-BSD FRML MDRD: 83 ML/MIN/1.73
GLOBULIN UR ELPH-MCNC: 2.2 GM/DL
GLUCOSE BLD-MCNC: 93 MG/DL (ref 70–100)
HCT VFR BLD AUTO: 39 % (ref 34.5–44)
HGB BLD-MCNC: 13.3 G/DL (ref 11.5–15.5)
IMM GRANULOCYTES # BLD: 0.01 10*3/MM3 (ref 0–0.03)
IMM GRANULOCYTES NFR BLD: 0.4 % (ref 0–0.6)
LYMPHOCYTES # BLD AUTO: 1.19 10*3/MM3 (ref 0.6–4.8)
LYMPHOCYTES NFR BLD AUTO: 41.9 % (ref 24–44)
MCH RBC QN AUTO: 32.3 PG (ref 27–31)
MCHC RBC AUTO-ENTMCNC: 34.1 G/DL (ref 32–36)
MCV RBC AUTO: 94.7 FL (ref 80–99)
MONOCYTES # BLD AUTO: 0.26 10*3/MM3 (ref 0–1)
MONOCYTES NFR BLD AUTO: 9.2 % (ref 0–12)
NEUTROPHILS # BLD AUTO: 1.27 10*3/MM3 (ref 1.5–8.3)
NEUTROPHILS NFR BLD AUTO: 44.6 % (ref 41–71)
PLATELET # BLD AUTO: 172 10*3/MM3 (ref 150–450)
PMV BLD AUTO: 9.5 FL (ref 6–12)
POTASSIUM BLD-SCNC: 4 MMOL/L (ref 3.5–5.5)
PROT SERPL-MCNC: 6.6 G/DL (ref 5.7–8.2)
RBC # BLD AUTO: 4.12 10*6/MM3 (ref 3.89–5.14)
SODIUM BLD-SCNC: 139 MMOL/L (ref 132–146)
WBC NRBC COR # BLD: 2.84 10*3/MM3 (ref 3.5–10.8)

## 2018-05-24 PROCEDURE — 36415 COLL VENOUS BLD VENIPUNCTURE: CPT | Performed by: INTERNAL MEDICINE

## 2018-05-24 PROCEDURE — 85025 COMPLETE CBC W/AUTO DIFF WBC: CPT | Performed by: INTERNAL MEDICINE

## 2018-05-24 PROCEDURE — 80053 COMPREHEN METABOLIC PANEL: CPT | Performed by: INTERNAL MEDICINE

## 2018-06-21 ENCOUNTER — OFFICE VISIT (OUTPATIENT)
Dept: PULMONOLOGY | Facility: CLINIC | Age: 68
End: 2018-06-21

## 2018-06-21 ENCOUNTER — LAB (OUTPATIENT)
Dept: LAB | Facility: HOSPITAL | Age: 68
End: 2018-06-21

## 2018-06-21 ENCOUNTER — TRANSCRIBE ORDERS (OUTPATIENT)
Dept: LAB | Facility: HOSPITAL | Age: 68
End: 2018-06-21

## 2018-06-21 VITALS
SYSTOLIC BLOOD PRESSURE: 112 MMHG | TEMPERATURE: 97.5 F | WEIGHT: 134 LBS | BODY MASS INDEX: 23.74 KG/M2 | HEIGHT: 63 IN | DIASTOLIC BLOOD PRESSURE: 74 MMHG | OXYGEN SATURATION: 98 % | HEART RATE: 55 BPM | RESPIRATION RATE: 16 BRPM

## 2018-06-21 DIAGNOSIS — J47.9 BRONCHIECTASIS WITHOUT COMPLICATION (HCC): ICD-10-CM

## 2018-06-21 DIAGNOSIS — A31.0 MYCOBACTERIUM AVIUM-INTRACELLULARE INFECTION (HCC): ICD-10-CM

## 2018-06-21 DIAGNOSIS — R04.2 COUGHING UP BLOOD: ICD-10-CM

## 2018-06-21 DIAGNOSIS — A31.0 PULMONARY DISEASE DUE TO MYCOBACTERIA (HCC): ICD-10-CM

## 2018-06-21 DIAGNOSIS — J43.2 CENTRIACINAR EMPHYSEMA (HCC): ICD-10-CM

## 2018-06-21 DIAGNOSIS — Z88.9 ALLERGY, DRUG: ICD-10-CM

## 2018-06-21 DIAGNOSIS — D70.8 CHRONIC BENIGN NEUTROPENIA (HCC): ICD-10-CM

## 2018-06-21 DIAGNOSIS — R06.02 SHORTNESS OF BREATH: Primary | ICD-10-CM

## 2018-06-21 DIAGNOSIS — J44.9 OBSTRUCTIVE CHRONIC BRONCHITIS WITHOUT EXACERBATION (HCC): ICD-10-CM

## 2018-06-21 DIAGNOSIS — D69.6 ACQUIRED THROMBOCYTOPENIA (HCC): ICD-10-CM

## 2018-06-21 DIAGNOSIS — R06.09 DOE (DYSPNEA ON EXERTION): ICD-10-CM

## 2018-06-21 DIAGNOSIS — D69.6 ACQUIRED THROMBOCYTOPENIA (HCC): Primary | ICD-10-CM

## 2018-06-21 LAB
BASOPHILS # BLD AUTO: 0.03 10*3/MM3 (ref 0–0.2)
BASOPHILS NFR BLD AUTO: 0.8 % (ref 0–1)
DEPRECATED RDW RBC AUTO: 48.8 FL (ref 37–54)
EOSINOPHIL # BLD AUTO: 0.12 10*3/MM3 (ref 0–0.3)
EOSINOPHIL NFR BLD AUTO: 3.3 % (ref 0–3)
ERYTHROCYTE [DISTWIDTH] IN BLOOD BY AUTOMATED COUNT: 13.5 % (ref 11.3–14.5)
ERYTHROCYTE [SEDIMENTATION RATE] IN BLOOD: 2 MM/HR (ref 0–30)
HCT VFR BLD AUTO: 39.1 % (ref 34.5–44)
HGB BLD-MCNC: 12.8 G/DL (ref 11.5–15.5)
IMM GRANULOCYTES # BLD: 0.01 10*3/MM3 (ref 0–0.03)
IMM GRANULOCYTES NFR BLD: 0.3 % (ref 0–0.6)
LYMPHOCYTES # BLD AUTO: 1.28 10*3/MM3 (ref 0.6–4.8)
LYMPHOCYTES NFR BLD AUTO: 35 % (ref 24–44)
MCH RBC QN AUTO: 32.2 PG (ref 27–31)
MCHC RBC AUTO-ENTMCNC: 32.7 G/DL (ref 32–36)
MCV RBC AUTO: 98.5 FL (ref 80–99)
MONOCYTES # BLD AUTO: 0.28 10*3/MM3 (ref 0–1)
MONOCYTES NFR BLD AUTO: 7.7 % (ref 0–12)
NEUTROPHILS # BLD AUTO: 1.95 10*3/MM3 (ref 1.5–8.3)
NEUTROPHILS NFR BLD AUTO: 53.2 % (ref 41–71)
PLATELET # BLD AUTO: 239 10*3/MM3 (ref 150–450)
PMV BLD AUTO: 9.6 FL (ref 6–12)
RBC # BLD AUTO: 3.97 10*6/MM3 (ref 3.89–5.14)
WBC NRBC COR # BLD: 3.66 10*3/MM3 (ref 3.5–10.8)

## 2018-06-21 PROCEDURE — 85025 COMPLETE CBC W/AUTO DIFF WBC: CPT

## 2018-06-21 PROCEDURE — 94729 DIFFUSING CAPACITY: CPT | Performed by: INTERNAL MEDICINE

## 2018-06-21 PROCEDURE — 94375 RESPIRATORY FLOW VOLUME LOOP: CPT | Performed by: INTERNAL MEDICINE

## 2018-06-21 PROCEDURE — 94726 PLETHYSMOGRAPHY LUNG VOLUMES: CPT | Performed by: INTERNAL MEDICINE

## 2018-06-21 PROCEDURE — 36415 COLL VENOUS BLD VENIPUNCTURE: CPT

## 2018-06-21 PROCEDURE — 99213 OFFICE O/P EST LOW 20 MIN: CPT | Performed by: INTERNAL MEDICINE

## 2018-06-21 PROCEDURE — 85652 RBC SED RATE AUTOMATED: CPT

## 2018-06-21 NOTE — PROGRESS NOTES
Selina Ruano is a 67 y.o. female here for evaluation of   Chief Complaint   Patient presents with   • Mycobacterium avium-intracellulare infection     f/u       Problem list:  1. Mycobacterium avium complex infection  2. Hemoptysis, resolved  3. Centrilobular emphysema  4. Ocular rosacea  5. Coronary artery disease, single-vessel  6. Allergic rhinitis  7. Acne  8. Dyslipidemia  9. Hypotension  10. Hypothyroid  11. Thyroid cancer/total thyroidectomy  12. Cervical disc disease/cervical fusion with cage and discectomy  13. Lumbar disc disease  14. Fibrocystic breast disease  15. Right breast lumpectomy, benign  16. Glaucoma/laser surgery for closed angle glaucoma  17. Hemorrhoids/hemorrhoidectomy  18. Benign positional vertigo  19. Vitamin D deficiency  20. Osteopenia  21. Bronchoscopy, lavage October 2017  22. Left heart catheterization, 2017, hazard, 80% 1 vessel disease  23. Colonoscopy  24. Tonsillectomy  25. Root canal  26. Vaginal hysterectomy with salpingo-oophorectomy  27. No known drug allergies  28. Previous tobacco, 2 packs per day for 27 years    History of Present Illness    67-year-old woman, former smoker presenting with a chronic cough and right middle lobe infiltrate. Bronchoscopy revealed no endobronchial lesions, cultures positive for Mycobacterium avium complex. CT scan revealed multiple parenchymal upper lobe nodular infiltrate, bronchiectasis with some peripheral fibrosis and some emphysematous changes. She was evaluated by infectious disease and started on azithromycin, Levaquin, rifampin in January. Her cough improved and hemoptysis resolved. Unfortunately, she developed severe thrombocytopenia with a platelet count of 15,000 June 12, 2018. Right arm antibiotics were discontinued and a repeat platelet count on June 18 was 189,000. Currently she has a mild cough at night that she attributes to postnasal drip. If she takes an allergy pill it helps. She does not have any wheezing and is not short  of air.    Review of Systems   Constitutional: Positive for chills and fatigue.   HENT: Positive for congestion, postnasal drip, sinus pressure and trouble swallowing.    Eyes: Positive for photophobia, redness and itching.   Respiratory: Positive for cough and shortness of breath.    Gastrointestinal: Positive for abdominal pain, constipation and nausea.   Endocrine: Positive for cold intolerance.   Musculoskeletal: Positive for arthralgias, back pain, myalgias and neck stiffness.   Allergic/Immunologic: Positive for environmental allergies.   Neurological: Positive for dizziness, light-headedness and headaches.   Hematological: Bruises/bleeds easily.   Psychiatric/Behavioral: Positive for decreased concentration, dysphoric mood and sleep disturbance. The patient is nervous/anxious.          Current Outpatient Prescriptions:   •  albuterol (PROVENTIL HFA;VENTOLIN HFA) 108 (90 Base) MCG/ACT inhaler, Inhale 2 puffs Every 4 (Four) Hours As Needed for Wheezing., Disp: 3 inhaler, Rfl: 3  •  atorvastatin (LIPITOR) 10 MG tablet, Take 10 mg by mouth Daily., Disp: , Rfl:   •  doxycycline (VIBRAMYCIN) 100 MG capsule, Take 100 mg by mouth Daily., Disp: , Rfl:   •  ergocalciferol (ERGOCALCIFEROL) 09789 units capsule, Take 50,000 Units by mouth 1 (One) Time Per Week., Disp: , Rfl:   •  meclizine (ANTIVERT) 12.5 MG tablet, Take 12.5 mg by mouth As Needed for dizziness., Disp: , Rfl:   •  pantoprazole (PROTONIX) 40 MG EC tablet, Take 40 mg by mouth Daily., Disp: , Rfl:   •  PREMARIN 0.625 MG/GM vaginal cream, , Disp: , Rfl:   •  SYNTHROID 100 MCG tablet, Take 100 mcg by mouth Daily., Disp: , Rfl:   •  telmisartan (MICARDIS) 40 MG tablet, Take 40 mg by mouth Daily., Disp: , Rfl:   •  traZODone (DESYREL) 50 MG tablet, Take 50 mg by mouth As Needed., Disp: , Rfl:   •  tretinoin (RETIN-A) 0.025 % cream, , Disp: , Rfl:   •  valACYclovir (VALTREX) 1000 MG tablet, Take 1,000 mg by mouth 2 (Two) Times a Day., Disp: , Rfl:     Past  "Medical History:   Diagnosis Date   • Acne    • Allergic rhinitis    • CAD (coronary artery disease)     1 vessel   • Constipation, chronic    • COPD (chronic obstructive pulmonary disease)    • DDD (degenerative disc disease), cervical    • DDD (degenerative disc disease), lumbar    • Dyslipidemia    • Fibrocystic breast changes    • Glaucoma    • Hemorrhoids    • Hypertension    • IBS (irritable bowel syndrome)    • Ocular rosacea    • Osteopenia    • Thyroid cancer    • Vertigo, benign positional    • Vitamin D deficiency      Past Surgical History:   Procedure Laterality Date   • BREAST LUMPECTOMY Right     benign   • BRONCHOSCOPY N/A 10/10/2017    Procedure: BRONCHOSCOPY BIOPSY WITH FLUORO;  Surgeon: Mily Cordon MD;  Location: [facility] ENDOSCOPY;  Service:    • CARDIAC CATHETERIZATION  2017    1 vessel 80%, [place]   • CERVICAL SPINE SURGERY      fusion with cage, diskectomy   • COLONOSCOPY  2015   • DENTAL PROCEDURE      root canal   • GLAUCOMA SURGERY      laser for closed angle glaucoma   • HEMORRHOIDECTOMY     • TONSILLECTOMY     • TOTAL THYROIDECTOMY  2001   • VAGINAL HYSTERECTOMY SALPINGO OOPHORECTOMY       Social History     Social History   • Marital status:    • Number of children: 2     Occupational History   • retired       Social History Main Topics   • Smoking status: Former Smoker     Packs/day: 2.00     Years: 27.00     Types: Cigarettes   • Smokeless tobacco: Former User   • Alcohol use 1.2 oz/week     2 Cans of beer per week   • Drug use: Unknown     Other Topics Concern   • Not on file     Family History   Problem Relation Age of Onset   • Stroke Mother    • Hypertension Mother    • No Known Problems Father    • No Known Problems Sister    • Rheum arthritis Sister    • Coronary artery disease Brother      Blood pressure 112/74, pulse 55, temperature 97.5 °F (36.4 °C), resp. rate 16, height 158.8 cm (62.5\"), weight 60.8 kg (134 lb), SpO2 98 %.    Physical Exam "   Constitutional: She is oriented to person, place, and time. She appears well-developed and well-nourished. No distress.   HENT:   Head: Normocephalic and atraumatic.   Clear PND   Eyes: No scleral icterus.   Neck: No JVD present. No tracheal deviation present. No thyromegaly present.   Cardiovascular: Normal rate, regular rhythm and normal heart sounds.    No murmur heard.  Pulmonary/Chest: Effort normal and breath sounds normal. No respiratory distress. She has no wheezes.   Abdominal: Soft. Bowel sounds are normal. She exhibits no distension. There is no tenderness.   Musculoskeletal: She exhibits deformity. She exhibits no edema.   OA changes hands   Lymphadenopathy:     She has no cervical adenopathy.   Neurological: She is alert and oriented to person, place, and time.   Skin: Skin is warm.   Psychiatric: She has a normal mood and affect.         PFTs:  FVC 2.22 L, 78%, FEV1 1.59 L, 74%, ratio 72%, total lung capacity 3.71 L, 82%, diffusion capacity 17.4, 99%, no obstruction or restriction preserved diffusion capacity  Radiology:    Lab:    Selina was seen today for mycobacterium avium-intracellulare infection.    Diagnoses and all orders for this visit:    Shortness of breath  -     Pulmonary Function Test    Mycobacterium avium-intracellulare infection    COLLINS (dyspnea on exertion)    Bronchiectasis without complication        Discussion:   67-year-old woman presenting with chronic cough and hemoptysis with some nodular infiltrates and underlying bronchiectasis. Bronchoscopy was positive for Mycobacterium avium complex. With her nodular infiltrates was she should undergo treatment. She has been on azithromycin, Levaquin, rifampin since January. Her cough did improve significantly. However, June 12 her platelet count was only 15,000. Antibiotics were stopped and a repeat platlet count was 189,000. I am surprised her platelet count recovered back quickly and I wonder if the initial platelet count was false  secondary to clumping. She has some mild allergic rhinitis on examination with adequate symptom control from over-the-counter antihistamines. He is scheduled to see Dr. Roper today. Choice really is to restart medication and monitor platelet count closer, watch for recurrent symptoms off therapy. She can continue to use an albuterol rescue inhaler as needed for tightness wheezing or shortness of air. She is already scheduled to see Dr. Roper in September with a CT scan. I would like to see her back in 6 months to assess her respiratory function and symptoms.    Mily Cordon MD

## 2018-09-20 ENCOUNTER — HOSPITAL ENCOUNTER (OUTPATIENT)
Dept: CT IMAGING | Facility: HOSPITAL | Age: 68
Discharge: HOME | End: 2018-09-20
Attending: INTERNAL MEDICINE | Admitting: INTERNAL MEDICINE

## 2018-09-20 DIAGNOSIS — R04.2 HEMOPTYSIS: ICD-10-CM

## 2018-09-20 DIAGNOSIS — A31.0 PULMONARY MYCOBACTERIUM AVIUM COMPLEX (MAC) INFECTION (HCC): ICD-10-CM

## 2018-09-20 PROCEDURE — 71250 CT THORAX DX C-: CPT

## 2019-04-04 ENCOUNTER — TELEPHONE (OUTPATIENT)
Dept: PULMONOLOGY | Facility: CLINIC | Age: 69
End: 2019-04-04

## 2019-04-04 NOTE — TELEPHONE ENCOUNTER
Patient called says is spitting up a lot of blood feels like something stuck in throat. Primary dr took her off all her meds in Dec is concerned denies any fever or any other symptoms please  Advise  Thank you

## 2019-04-04 NOTE — TELEPHONE ENCOUNTER
She has an appointment with Dr. Cordon on April 11, she will need to discuss that further with her.

## 2019-04-09 ENCOUNTER — HOSPITAL ENCOUNTER (OUTPATIENT)
Dept: CT IMAGING | Facility: HOSPITAL | Age: 69
Discharge: HOME | End: 2019-04-09
Admitting: INTERNAL MEDICINE

## 2019-04-09 ENCOUNTER — TRANSCRIBE ORDERS (OUTPATIENT)
Dept: LAB | Facility: HOSPITAL | Age: 69
End: 2019-04-09

## 2019-04-09 ENCOUNTER — LAB (OUTPATIENT)
Dept: LAB | Facility: HOSPITAL | Age: 69
End: 2019-04-09

## 2019-04-09 ENCOUNTER — OFFICE VISIT (OUTPATIENT)
Dept: PULMONOLOGY | Facility: CLINIC | Age: 69
End: 2019-04-09

## 2019-04-09 VITALS
SYSTOLIC BLOOD PRESSURE: 120 MMHG | DIASTOLIC BLOOD PRESSURE: 76 MMHG | HEIGHT: 63 IN | WEIGHT: 139.5 LBS | OXYGEN SATURATION: 93 % | BODY MASS INDEX: 24.72 KG/M2 | HEART RATE: 73 BPM | TEMPERATURE: 98.6 F | RESPIRATION RATE: 18 BRPM

## 2019-04-09 DIAGNOSIS — A31.0 PULMONARY DISEASE DUE TO MYCOBACTERIA (HCC): Primary | ICD-10-CM

## 2019-04-09 DIAGNOSIS — J43.2 CENTRIACINAR EMPHYSEMA (HCC): ICD-10-CM

## 2019-04-09 DIAGNOSIS — J20.8 ACUTE BRONCHITIS DUE TO OTHER SPECIFIED ORGANISMS: ICD-10-CM

## 2019-04-09 DIAGNOSIS — J47.9 BRONCHIECTASIS WITHOUT COMPLICATION (HCC): ICD-10-CM

## 2019-04-09 DIAGNOSIS — D70.8 CHRONIC BENIGN NEUTROPENIA (HCC): ICD-10-CM

## 2019-04-09 DIAGNOSIS — A31.0 MYCOBACTERIUM AVIUM-INTRACELLULARE INFECTION (HCC): ICD-10-CM

## 2019-04-09 DIAGNOSIS — I25.119 ATHEROSCLEROSIS OF NATIVE CORONARY ARTERY WITH ANGINA PECTORIS, UNSPECIFIED WHETHER NATIVE OR TRANSPLANTED HEART (HCC): ICD-10-CM

## 2019-04-09 DIAGNOSIS — D69.59 THROMBOCYTOPENIA DUE TO DIMINISHED PLATELET PRODUCTION: ICD-10-CM

## 2019-04-09 DIAGNOSIS — J47.9 BRONCHIECTASIS WITHOUT COMPLICATION (HCC): Primary | ICD-10-CM

## 2019-04-09 DIAGNOSIS — A31.0 PULMONARY DISEASE DUE TO MYCOBACTERIA (HCC): ICD-10-CM

## 2019-04-09 DIAGNOSIS — R04.2 HEMOPTYSIS: ICD-10-CM

## 2019-04-09 DIAGNOSIS — I10 ESSENTIAL HYPERTENSION, MALIGNANT: ICD-10-CM

## 2019-04-09 DIAGNOSIS — R04.2 HEMOPTYSIS: Primary | ICD-10-CM

## 2019-04-09 LAB
ALBUMIN SERPL-MCNC: 4.7 G/DL (ref 3.5–5.2)
ALBUMIN/GLOB SERPL: 2 G/DL
ALP SERPL-CCNC: 78 U/L (ref 39–117)
ALT SERPL W P-5'-P-CCNC: 16 U/L (ref 1–33)
ANION GAP SERPL CALCULATED.3IONS-SCNC: 13 MMOL/L
AST SERPL-CCNC: 21 U/L (ref 1–32)
BASOPHILS # BLD AUTO: 0.03 10*3/MM3 (ref 0–0.2)
BASOPHILS NFR BLD AUTO: 0.7 % (ref 0–1.5)
BILIRUB SERPL-MCNC: 0.8 MG/DL (ref 0.2–1.2)
BUN BLD-MCNC: 7 MG/DL (ref 8–23)
BUN/CREAT SERPL: 10.4 (ref 7–25)
CALCIUM SPEC-SCNC: 9 MG/DL (ref 8.6–10.5)
CHLORIDE SERPL-SCNC: 93 MMOL/L (ref 98–107)
CO2 SERPL-SCNC: 29 MMOL/L (ref 22–29)
CREAT BLD-MCNC: 0.67 MG/DL (ref 0.57–1)
DEPRECATED RDW RBC AUTO: 45.6 FL (ref 37–54)
EOSINOPHIL # BLD AUTO: 0.08 10*3/MM3 (ref 0–0.4)
EOSINOPHIL NFR BLD AUTO: 1.9 % (ref 0.3–6.2)
ERYTHROCYTE [DISTWIDTH] IN BLOOD BY AUTOMATED COUNT: 13.2 % (ref 12.3–15.4)
ERYTHROCYTE [SEDIMENTATION RATE] IN BLOOD: 8 MM/HR (ref 0–30)
GFR SERPL CREATININE-BSD FRML MDRD: 87 ML/MIN/1.73
GLOBULIN UR ELPH-MCNC: 2.4 GM/DL
GLUCOSE BLD-MCNC: 91 MG/DL (ref 65–99)
HCT VFR BLD AUTO: 40.5 % (ref 34–46.6)
HGB BLD-MCNC: 13.6 G/DL (ref 12–15.9)
IMM GRANULOCYTES # BLD AUTO: 0 10*3/MM3 (ref 0–0.05)
IMM GRANULOCYTES NFR BLD AUTO: 0 % (ref 0–0.5)
INR PPP: 1.04 (ref 0.85–1.16)
LYMPHOCYTES # BLD AUTO: 1.25 10*3/MM3 (ref 0.7–3.1)
LYMPHOCYTES NFR BLD AUTO: 28.9 % (ref 19.6–45.3)
MCH RBC QN AUTO: 31.9 PG (ref 26.6–33)
MCHC RBC AUTO-ENTMCNC: 33.6 G/DL (ref 31.5–35.7)
MCV RBC AUTO: 94.8 FL (ref 79–97)
MONOCYTES # BLD AUTO: 0.34 10*3/MM3 (ref 0.1–0.9)
MONOCYTES NFR BLD AUTO: 7.9 % (ref 5–12)
NEUTROPHILS # BLD AUTO: 2.62 10*3/MM3 (ref 1.4–7)
NEUTROPHILS NFR BLD AUTO: 60.6 % (ref 42.7–76)
PLATELET # BLD AUTO: 210 10*3/MM3 (ref 140–450)
PMV BLD AUTO: 10 FL (ref 6–12)
POTASSIUM BLD-SCNC: 4 MMOL/L (ref 3.5–5.2)
PROT SERPL-MCNC: 7.1 G/DL (ref 6–8.5)
PROTHROMBIN TIME: 13.1 SECONDS (ref 11.2–14.3)
RBC # BLD AUTO: 4.27 10*6/MM3 (ref 3.77–5.28)
SODIUM BLD-SCNC: 135 MMOL/L (ref 136–145)
WBC NRBC COR # BLD: 4.32 10*3/MM3 (ref 3.4–10.8)

## 2019-04-09 PROCEDURE — 80053 COMPREHEN METABOLIC PANEL: CPT

## 2019-04-09 PROCEDURE — 85610 PROTHROMBIN TIME: CPT

## 2019-04-09 PROCEDURE — 99213 OFFICE O/P EST LOW 20 MIN: CPT | Performed by: INTERNAL MEDICINE

## 2019-04-09 PROCEDURE — 71250 CT THORAX DX C-: CPT

## 2019-04-09 PROCEDURE — 36415 COLL VENOUS BLD VENIPUNCTURE: CPT

## 2019-04-09 PROCEDURE — 85652 RBC SED RATE AUTOMATED: CPT

## 2019-04-09 PROCEDURE — 85025 COMPLETE CBC W/AUTO DIFF WBC: CPT

## 2019-04-09 RX ORDER — NITROGLYCERIN 0.4 MG/1
0.4 TABLET SUBLINGUAL
COMMUNITY
Start: 2019-03-18

## 2019-04-09 RX ORDER — HYDROCHLOROTHIAZIDE 12.5 MG/1
12.5 CAPSULE, GELATIN COATED ORAL EVERY MORNING
COMMUNITY
Start: 2019-03-08

## 2019-04-09 RX ORDER — LEVOTHYROXINE SODIUM 0.1 MG/1
100 TABLET ORAL DAILY
COMMUNITY
Start: 2019-04-08 | End: 2019-04-09 | Stop reason: SDUPTHER

## 2019-04-09 RX ORDER — CEFDINIR 300 MG/1
300 CAPSULE ORAL 2 TIMES DAILY
Qty: 14 CAPSULE | Refills: 0 | Status: SHIPPED | OUTPATIENT
Start: 2019-04-09 | End: 2019-04-17 | Stop reason: SDUPTHER

## 2019-04-09 RX ORDER — CETIRIZINE HYDROCHLORIDE 5 MG/1
5 TABLET ORAL DAILY
COMMUNITY
Start: 2010-04-22

## 2019-04-09 RX ORDER — ROSUVASTATIN CALCIUM 5 MG/1
5 TABLET, COATED ORAL NIGHTLY
COMMUNITY
Start: 2019-04-06

## 2019-04-09 NOTE — PROGRESS NOTES
Selina Ruano is a 68 y.o. female here for evaluation of   Chief Complaint   Patient presents with   • Shortness of Breath     f/u   • Chest Pain   • Heartburn       Problem list:  1. Mycobacterium avium complex infection; azithromycin, Levaquin, rifampin completed June 2018  2. Hemoptysis, resolved  3. Centrilobular emphysema  4. Ocular rosacea  5. Coronary artery disease, single-vessel  6. Allergic rhinitis  7. Acne  8. Dyslipidemia  9. Hypotension  10. Hypothyroid  11. Thyroid cancer/total thyroidectomy  12. Cervical disc disease/cervical fusion with cage and discectomy  13. Lumbar disc disease  14. Fibrocystic breast disease  15. Right breast lumpectomy, benign  16. Glaucoma/laser surgery for closed angle glaucoma  17. Hemorrhoids/hemorrhoidectomy  18. Benign positional vertigo  19. Vitamin D deficiency  20. Osteopenia  21. Bronchoscopy, lavage October 2017  22. Left heart catheterization, 2017, hazard, 80% 1 vessel disease  23. Colonoscopy  24. Tonsillectomy  25. Root canal  26. Vaginal hysterectomy with salpingo-oophorectomy  27. No known drug allergies  28. Previous tobacco, 2 packs per day for 27 years      History of Present Illness    67-year-old woman, former smoker presenting with a chronic cough and right middle lobe infiltrate. Bronchoscopy revealed no endobronchial lesions, cultures positive for Mycobacterium avium complex. CT scan revealed multiple parenchymal upper lobe nodular infiltrate, bronchiectasis with some peripheral fibrosis and some emphysematous changes. She was evaluated by infectious disease and started on azithromycin, Levaquin, rifampin.  She completed therapy in June 2018.  She sent a sputum for AFB at Dr. Judson holliday to [place] but has not heard the results.  She had been doing well until last week when she developed some blood-streaked sputum.  She has no chest pain, fever, chills, edema.  She does feel more short of air with exertion.  She developed an episode of chest and  arm pain.  She has no previous history of coronary disease.  There is a family history of coronary disease.  She also previously smoked for 27 years quitting over 20 years ago.        Review of Systems   Constitutional: Positive for activity change and fatigue.   HENT: Positive for congestion and postnasal drip. Negative for sore throat and trouble swallowing.    Respiratory: Positive for cough and shortness of breath.    Cardiovascular: Positive for chest pain.   Gastrointestinal: Negative for abdominal pain, blood in stool and nausea.   Genitourinary:        Not emptying bladder well, US had thickened bladder wall   Musculoskeletal: Positive for arthralgias and neck stiffness.         Current Outpatient Medications:   •  albuterol (PROVENTIL HFA;VENTOLIN HFA) 108 (90 Base) MCG/ACT inhaler, Inhale 2 puffs Every 4 (Four) Hours As Needed for Wheezing., Disp: 3 inhaler, Rfl: 3  •  atorvastatin (LIPITOR) 10 MG tablet, Take 10 mg by mouth Daily., Disp: , Rfl:   •  cetirizine (zyrTEC) 5 MG tablet, Take 5 mg by mouth Daily., Disp: , Rfl:   •  doxycycline (VIBRAMYCIN) 100 MG capsule, Take 100 mg by mouth Daily., Disp: , Rfl:   •  ergocalciferol (ERGOCALCIFEROL) 48158 units capsule, Take 50,000 Units by mouth 1 (One) Time Per Week., Disp: , Rfl:   •  hydrochlorothiazide (MICROZIDE) 12.5 MG capsule, Take 12.5 mg by mouth Every Morning., Disp: , Rfl:   •  meclizine (ANTIVERT) 12.5 MG tablet, Take 12.5 mg by mouth As Needed for dizziness., Disp: , Rfl:   •  nitroglycerin (NITROSTAT) 0.4 MG SL tablet, Place 0.4 mg under the tongue Every 5 (Five) Minutes As Needed., Disp: , Rfl:   •  pantoprazole (PROTONIX) 40 MG EC tablet, Take 40 mg by mouth Daily., Disp: , Rfl:   •  PREMARIN 0.625 MG/GM vaginal cream, , Disp: , Rfl:   •  rosuvastatin (CRESTOR) 5 MG tablet, Take 5 mg by mouth Every Night., Disp: , Rfl:   •  SYNTHROID 100 MCG tablet, Take 100 mcg by mouth Daily., Disp: , Rfl:   •  telmisartan (MICARDIS) 40 MG tablet, Take 40 mg  by mouth Daily., Disp: , Rfl:   •  traZODone (DESYREL) 50 MG tablet, Take 50 mg by mouth As Needed., Disp: , Rfl:   •  tretinoin (RETIN-A) 0.025 % cream, , Disp: , Rfl:   •  valACYclovir (VALTREX) 1000 MG tablet, Take 1,000 mg by mouth 2 (Two) Times a Day., Disp: , Rfl:     Past Medical History:   Diagnosis Date   • Acne    • Allergic rhinitis    • CAD (coronary artery disease)     1 vessel   • Constipation, chronic    • COPD (chronic obstructive pulmonary disease) (CMS/HCC)    • DDD (degenerative disc disease), cervical    • DDD (degenerative disc disease), lumbar    • Dyslipidemia    • Fibrocystic breast changes    • Glaucoma    • Hemorrhoids    • Hypertension    • IBS (irritable bowel syndrome)    • Ocular rosacea    • Osteopenia    • Thyroid cancer (CMS/HCC)    • Vertigo, benign positional    • Vitamin D deficiency      Past Surgical History:   Procedure Laterality Date   • BREAST LUMPECTOMY Right     benign   • BRONCHOSCOPY N/A 10/10/2017    Procedure: BRONCHOSCOPY BIOPSY WITH FLUORO;  Surgeon: Mily Cordon MD;  Location: [facility] ENDOSCOPY;  Service:    • CARDIAC CATHETERIZATION  2017    1 vessel 80%, [place]   • CERVICAL SPINE SURGERY      fusion with cage, diskectomy   • COLONOSCOPY  2015   • DENTAL PROCEDURE      root canal   • GLAUCOMA SURGERY      laser for closed angle glaucoma   • HEMORRHOIDECTOMY     • TONSILLECTOMY     • TOTAL THYROIDECTOMY  2001   • VAGINAL HYSTERECTOMY SALPINGO OOPHORECTOMY       Social History     Socioeconomic History   • Marital status:      Spouse name: Not on file   • Number of children: 2   • Years of education: Not on file   • Highest education level: Not on file   Occupational History   • Occupation: retired    Tobacco Use   • Smoking status: Former Smoker     Packs/day: 2.00     Years: 27.00     Pack years: 54.00     Types: Cigarettes   • Smokeless tobacco: Former User   Substance and Sexual Activity   • Alcohol use: Yes     Alcohol/week: 1.2 oz      "Types: 2 Cans of beer per week     Family History   Problem Relation Age of Onset   • Stroke Mother    • Hypertension Mother    • No Known Problems Father    • No Known Problems Sister    • Rheum arthritis Sister    • Coronary artery disease Brother      Blood pressure 120/76, pulse 73, temperature 98.6 °F (37 °C), resp. rate 18, height 158.8 cm (62.5\"), weight 63.3 kg (139 lb 8 oz), SpO2 93 %.    Physical Exam   Constitutional: She is oriented to person, place, and time. She appears well-developed. No distress.   HENT:   Head: Normocephalic.   Nasal erythema   Eyes: Pupils are equal, round, and reactive to light. No scleral icterus.   Neck: Neck supple. No tracheal deviation present. No thyromegaly present.   Cardiovascular: Normal rate, regular rhythm, normal heart sounds and intact distal pulses.   No murmur heard.  Pulmonary/Chest: Effort normal and breath sounds normal. She has no wheezes.   Abdominal: Soft. Bowel sounds are normal.   Musculoskeletal: She exhibits no edema.   Lymphadenopathy:     She has no cervical adenopathy.   Neurological: She is alert and oriented to person, place, and time.   Skin: Skin is warm and dry.         PFTs:    Radiology:    Lab:    Selina was seen today for shortness of breath, chest pain and heartburn.    Diagnoses and all orders for this visit:    Bronchiectasis without complication (CMS/HCC)    Mycobacterium avium-intracellulare infection (CMS/HCC)    Acute bronchitis due to other specified organisms        Discussion:   68-year-old woman, former smoker with a history of bronchiectasis and Mycobacterium avium complex.  She completed therapy in June 2018.  She had been doing well until this week when she developed blood-streaked sputum.  She is not having blood and blood clots.  She has no fever or chills.    Repeat high-resolution CT scan of the chest  Cefdnir 300 mg twice daily  Follow-up sputum AFB sent in February to [facility]  She is scheduled to see Dr. Roper today  If " she develops blood and blood clots she is to immediately come to the emergency room    Mily Cordon MD

## 2019-04-17 ENCOUNTER — TELEPHONE (OUTPATIENT)
Dept: PULMONOLOGY | Facility: CLINIC | Age: 69
End: 2019-04-17

## 2019-04-17 DIAGNOSIS — J20.8 ACUTE BRONCHITIS DUE TO OTHER SPECIFIED ORGANISMS: Primary | ICD-10-CM

## 2019-04-17 RX ORDER — CEFDINIR 300 MG/1
300 CAPSULE ORAL 2 TIMES DAILY
Qty: 14 CAPSULE | Refills: 0 | Status: SHIPPED | OUTPATIENT
Start: 2019-04-17 | End: 2019-08-29

## 2019-04-17 NOTE — TELEPHONE ENCOUNTER
Called patient per Dr Cordon's request. Reviewed her CT Chest with her. Asked her how she was doing after coarse of ABX which she completed this week. She said she improved briefly, but cough came back after ABX were finished. Sputum is thick, but more brown tinged instead of bloody. She is having difficulty getting it up. She is not having fever or chills, or SOB, but thinks she would benefit from continued ABX.

## 2019-04-17 NOTE — TELEPHONE ENCOUNTER
Called patient to let her know that we were sending ABX order to her pharmacy and that we would like her to come in to get a sputum sample for culture. She asked that we send her an order for it so she can have it done locally.

## 2019-05-06 ENCOUNTER — TELEPHONE (OUTPATIENT)
Dept: PULMONOLOGY | Facility: CLINIC | Age: 69
End: 2019-05-06

## 2019-05-06 NOTE — TELEPHONE ENCOUNTER
Patient called wanting her sputum culture results. We have not received them from [facility] yet. I will call and have the results faxed to us. Once received, I will notify the patient.

## 2019-08-29 ENCOUNTER — OFFICE VISIT (OUTPATIENT)
Dept: PULMONOLOGY | Facility: CLINIC | Age: 69
End: 2019-08-29

## 2019-08-29 VITALS
TEMPERATURE: 98.2 F | HEART RATE: 58 BPM | OXYGEN SATURATION: 96 % | SYSTOLIC BLOOD PRESSURE: 122 MMHG | BODY MASS INDEX: 23.92 KG/M2 | WEIGHT: 135 LBS | DIASTOLIC BLOOD PRESSURE: 76 MMHG | HEIGHT: 63 IN

## 2019-08-29 DIAGNOSIS — A31.0 MYCOBACTERIUM AVIUM-INTRACELLULARE INFECTION (HCC): ICD-10-CM

## 2019-08-29 DIAGNOSIS — J47.9 BRONCHIECTASIS WITHOUT COMPLICATION (HCC): Primary | ICD-10-CM

## 2019-08-29 PROCEDURE — 99213 OFFICE O/P EST LOW 20 MIN: CPT | Performed by: INTERNAL MEDICINE

## 2019-09-08 ENCOUNTER — APPOINTMENT (OUTPATIENT)
Dept: CT IMAGING | Age: 69
End: 2019-09-08
Payer: MEDICARE

## 2019-09-08 ENCOUNTER — HOSPITAL ENCOUNTER (EMERGENCY)
Age: 69
Discharge: HOME | End: 2019-09-08
Attending: EMERGENCY MEDICINE
Payer: MEDICARE

## 2019-09-08 VITALS
OXYGEN SATURATION: 98 % | TEMPERATURE: 97.4 F | HEIGHT: 63 IN | WEIGHT: 134 LBS | HEART RATE: 59 BPM | DIASTOLIC BLOOD PRESSURE: 66 MMHG | BODY MASS INDEX: 23.74 KG/M2 | SYSTOLIC BLOOD PRESSURE: 143 MMHG | RESPIRATION RATE: 16 BRPM

## 2019-09-08 DIAGNOSIS — J21.8 ACUTE BRONCHIOLITIS DUE TO OTHER SPECIFIED ORGANISMS: Primary | ICD-10-CM

## 2019-09-08 LAB
ANION GAP SERPL CALCULATED.3IONS-SCNC: 9 MEQ/L (ref 9–15)
ATYPICAL LYMPHOCYTE RELATIVE PERCENT: 2 %
BANDED NEUTROPHILS RELATIVE PERCENT: 4 % (ref 5–11)
BASOPHILS ABSOLUTE: 0 K/UL (ref 0–0.2)
BASOPHILS RELATIVE PERCENT: 1.2 %
BUN BLDV-MCNC: 11 MG/DL (ref 8–23)
CALCIUM SERPL-MCNC: 8.6 MG/DL (ref 8.5–9.9)
CHLORIDE BLD-SCNC: 102 MEQ/L (ref 95–107)
CO2: 29 MEQ/L (ref 20–31)
CREAT SERPL-MCNC: 0.68 MG/DL (ref 0.5–0.9)
EOSINOPHILS ABSOLUTE: 0.1 K/UL (ref 0–0.7)
EOSINOPHILS RELATIVE PERCENT: 3 %
GFR AFRICAN AMERICAN: >60
GFR NON-AFRICAN AMERICAN: >60
GLUCOSE BLD-MCNC: 100 MG/DL (ref 70–99)
HCT VFR BLD CALC: 40.9 % (ref 37–47)
HEMOGLOBIN: 13.9 G/DL (ref 12–16)
LYMPHOCYTES ABSOLUTE: 1.5 K/UL (ref 1–4.8)
LYMPHOCYTES RELATIVE PERCENT: 38 %
MCH RBC QN AUTO: 32.4 PG (ref 27–31.3)
MCHC RBC AUTO-ENTMCNC: 33.9 % (ref 33–37)
MCV RBC AUTO: 95.7 FL (ref 82–100)
MONOCYTES ABSOLUTE: 0.2 K/UL (ref 0.2–0.8)
MONOCYTES RELATIVE PERCENT: 5.3 %
NEUTROPHILS ABSOLUTE: 1.9 K/UL (ref 1.4–6.5)
NEUTROPHILS RELATIVE PERCENT: 47 %
PDW BLD-RTO: 13.6 % (ref 11.5–14.5)
PLATELET # BLD: 230 K/UL (ref 130–400)
PLATELET SLIDE REVIEW: NORMAL
POTASSIUM SERPL-SCNC: 3.3 MEQ/L (ref 3.4–4.9)
RBC # BLD: 4.27 M/UL (ref 4.2–5.4)
SMUDGE CELLS: 7.4
SODIUM BLD-SCNC: 140 MEQ/L (ref 135–144)
WBC # BLD: 3.8 K/UL (ref 4.8–10.8)

## 2019-09-08 PROCEDURE — 6360000004 HC RX CONTRAST MEDICATION: Performed by: EMERGENCY MEDICINE

## 2019-09-08 PROCEDURE — 2580000003 HC RX 258: Performed by: EMERGENCY MEDICINE

## 2019-09-08 PROCEDURE — 71260 CT THORAX DX C+: CPT

## 2019-09-08 PROCEDURE — 36415 COLL VENOUS BLD VENIPUNCTURE: CPT

## 2019-09-08 PROCEDURE — 99285 EMERGENCY DEPT VISIT HI MDM: CPT

## 2019-09-08 PROCEDURE — 80048 BASIC METABOLIC PNL TOTAL CA: CPT

## 2019-09-08 PROCEDURE — 85025 COMPLETE CBC W/AUTO DIFF WBC: CPT

## 2019-09-08 RX ORDER — 0.9 % SODIUM CHLORIDE 0.9 %
500 INTRAVENOUS SOLUTION INTRAVENOUS ONCE
Status: COMPLETED | OUTPATIENT
Start: 2019-09-08 | End: 2019-09-08

## 2019-09-08 RX ORDER — TELMISARTAN 40 MG/1
40 TABLET ORAL DAILY
COMMUNITY

## 2019-09-08 RX ORDER — PANTOPRAZOLE SODIUM 40 MG/1
40 TABLET, DELAYED RELEASE ORAL DAILY
COMMUNITY

## 2019-09-08 RX ORDER — AZITHROMYCIN 250 MG/1
TABLET, FILM COATED ORAL
Qty: 1 PACKET | Refills: 0 | Status: SHIPPED | OUTPATIENT
Start: 2019-09-08 | End: 2019-09-12

## 2019-09-08 RX ORDER — DOXYCYCLINE HYCLATE 50 MG/1
50 CAPSULE ORAL DAILY
COMMUNITY

## 2019-09-08 RX ORDER — LEVOTHYROXINE SODIUM 0.1 MG/1
100 TABLET ORAL DAILY
COMMUNITY

## 2019-09-08 RX ORDER — SODIUM CHLORIDE 9 MG/ML
INJECTION, SOLUTION INTRAVENOUS CONTINUOUS
Status: DISCONTINUED | OUTPATIENT
Start: 2019-09-08 | End: 2019-09-08 | Stop reason: HOSPADM

## 2019-09-08 RX ADMIN — SODIUM CHLORIDE: 9 INJECTION, SOLUTION INTRAVENOUS at 03:30

## 2019-09-08 RX ADMIN — SODIUM CHLORIDE 500 ML: 9 INJECTION, SOLUTION INTRAVENOUS at 03:31

## 2019-09-08 RX ADMIN — IOPAMIDOL 75 ML: 612 INJECTION, SOLUTION INTRAVENOUS at 03:48

## 2019-09-08 ASSESSMENT — ENCOUNTER SYMPTOMS
SHORTNESS OF BREATH: 0
WHEEZING: 0
ABDOMINAL DISTENTION: 0
EYES NEGATIVE: 1
SINUS PAIN: 0
BLOOD IN STOOL: 0
COUGH: 0
EYE DISCHARGE: 0
EYE PAIN: 0
RESPIRATORY NEGATIVE: 1
GASTROINTESTINAL NEGATIVE: 1
BACK PAIN: 0
VOMITING: 0
DIARRHEA: 0
ABDOMINAL PAIN: 0
CHEST TIGHTNESS: 0
SORE THROAT: 0
NAUSEA: 0

## 2019-09-08 ASSESSMENT — PAIN DESCRIPTION - ORIENTATION: ORIENTATION: MID

## 2019-09-08 ASSESSMENT — PAIN SCALES - GENERAL: PAINLEVEL_OUTOF10: 5

## 2019-09-08 ASSESSMENT — PAIN DESCRIPTION - LOCATION: LOCATION: CHEST

## 2019-09-08 ASSESSMENT — PAIN DESCRIPTION - PAIN TYPE: TYPE: ACUTE PAIN

## 2019-09-08 NOTE — ED PROVIDER NOTES
complication    MDM    CRITICAL CARE TIME   Total Critical Care time was 0 minutes, excluding separately reportableprocedures. There was a high probability of clinicallysignificant/life threatening deterioration in the patient's condition which required my urgent intervention. CONSULTS:  None    PROCEDURES:  Unless otherwise noted below, none     Procedures    FINAL IMPRESSION      1. Acute bronchiolitis due to other specified organisms          DISPOSITION/PLAN   DISPOSITION        PATIENT REFERRED TO:  No follow-up provider specified. DISCHARGE MEDICATIONS:  New Prescriptions    AZITHROMYCIN (ZITHROMAX Z-AMANDA) 250 MG TABLET    Take 2 tablets (500 mg) on Day 1, and then take 1 tablet (250 mg) on days 2 through 5.           (Please note that portions of this note were completed with a voice recognition program.  Efforts were made to edit the dictations but occasionally words are mis-transcribed.)    Heike Hu DO (electronically signed)  Attending Emergency Physician          Heike Hu DO  09/08/19 [id number]

## 2019-09-08 NOTE — ED NOTES
Return form ct, gave patient a warm blanket. She is aware we are waiting on results of blood work and ct.       Vivien Gorman RN  09/08/19 0511

## 2019-09-09 ENCOUNTER — TELEPHONE (OUTPATIENT)
Dept: PULMONOLOGY | Facility: CLINIC | Age: 69
End: 2019-09-09

## 2019-09-09 LAB
GFR AFRICAN AMERICAN: >60
GFR NON-AFRICAN AMERICAN: >60
PERFORMED ON: NORMAL
POC CREATININE: 0.7 MG/DL (ref 0.6–1.2)
POC SAMPLE TYPE: NORMAL

## 2019-09-09 NOTE — TELEPHONE ENCOUNTER
Patient called was in [place] went to ED coughing up blood has broncholitis was put on z pac patient told if continues to go back to ED patient will call next week when back from [facility] and let us know how she is and see if needs followup

## 2019-09-13 ENCOUNTER — TELEPHONE (OUTPATIENT)
Dept: PULMONOLOGY | Facility: CLINIC | Age: 69
End: 2019-09-13

## 2019-09-13 NOTE — TELEPHONE ENCOUNTER
Patient called has completed antobiotic and still coughing up some chunks of blood  And some other stuff please advise

## 2019-09-13 NOTE — TELEPHONE ENCOUNTER
Called patient relayed luz elena patient stated she had already been back to ER and has tootie will call Monday to talk to Dr Levi. I informed patient if coughing up any more blood  To go to ER

## 2019-09-16 NOTE — TELEPHONE ENCOUNTER
Pt called stating that she did go to ER and will be faxing/mailing over d/c summary along with CT results.

## 2022-08-18 ENCOUNTER — OFFICE VISIT (OUTPATIENT)
Dept: PULMONOLOGY | Facility: CLINIC | Age: 72
End: 2022-08-18

## 2022-08-18 VITALS
HEART RATE: 76 BPM | TEMPERATURE: 97.5 F | WEIGHT: 131 LBS | SYSTOLIC BLOOD PRESSURE: 128 MMHG | OXYGEN SATURATION: 97 % | BODY MASS INDEX: 23.58 KG/M2 | DIASTOLIC BLOOD PRESSURE: 78 MMHG

## 2022-08-18 DIAGNOSIS — J47.9 BRONCHIECTASIS WITHOUT COMPLICATION: Primary | ICD-10-CM

## 2022-08-18 DIAGNOSIS — A31.0 MYCOBACTERIUM AVIUM-INTRACELLULARE INFECTION: ICD-10-CM

## 2022-08-18 DIAGNOSIS — R04.2 HEMOPTYSIS: ICD-10-CM

## 2022-08-18 PROBLEM — J20.8 ACUTE BRONCHITIS DUE TO OTHER SPECIFIED ORGANISMS: Status: RESOLVED | Noted: 2019-04-09 | Resolved: 2022-08-18

## 2022-08-18 PROCEDURE — 94726 PLETHYSMOGRAPHY LUNG VOLUMES: CPT | Performed by: INTERNAL MEDICINE

## 2022-08-18 PROCEDURE — 94729 DIFFUSING CAPACITY: CPT | Performed by: INTERNAL MEDICINE

## 2022-08-18 PROCEDURE — 94375 RESPIRATORY FLOW VOLUME LOOP: CPT | Performed by: INTERNAL MEDICINE

## 2022-08-18 PROCEDURE — 99213 OFFICE O/P EST LOW 20 MIN: CPT | Performed by: INTERNAL MEDICINE

## 2022-08-18 RX ORDER — SULFAMETHOXAZOLE AND TRIMETHOPRIM 800; 160 MG/1; MG/1
1 TABLET ORAL DAILY
COMMUNITY
Start: 2022-08-15

## 2022-08-18 NOTE — PROGRESS NOTES
"Selina Ruano is a 72 y.o. female here for evaluation of   Chief Complaint   Patient presents with   • Follow-up   • Bronchiectasis without complication (CMS/HCC)       Problem list:  1. Mycobacterium avium complex infection; azithromycin, Levaquin, rifampin completed June 2018  2. Hemoptysis, recurred, 1/21  3. Centrilobular emphysema  4. COVID 19 1/22  5. Ocular rosacea  6. Coronary artery disease, single-vessel  7. Allergic rhinitis  8. Acne  9. Dyslipidemia  10. Hypotension  11. Hypothyroid  12. Thyroid cancer/total thyroidectomy  13. Cervical disc disease/cervical fusion with cage and discectomy  14. Lumbar disc disease  15. Fibrocystic breast disease  16. Right breast lumpectomy, benign  17. Glaucoma/laser surgery for closed angle glaucoma  18. Hemorrhoids/hemorrhoidectomy  19. Benign positional vertigo  20. Vitamin D deficiency  21. Osteopenia  22. Bronchoscopy, lavage October 2017  23. Left heart catheterization, 2017, hazard, 80% 1 vessel disease  24. Colonoscopy  25. Tonsillectomy  26. Root canal  27. Vaginal hysterectomy with salpingo-oophorectomy  28. Cataract surgery, 1 and 2, 2022  29. No known drug allergies  30. Previous tobacco, 2 packs per day for 27 years      History of Present Illness    72-year-old woman, former smoker (quit 30 years), who initially presented with a chronic cough and was found to have a right middle and upper lobe nodular infiltrate.  Bronchoscopy cultures were positive for Mycobacterium AVM complex.  He also had evidence of bronchiectasis and some underlying emphysema.  She was placed on azithromycin, Levaquin and rifampin.  She could not tolerate rifampin and finished the 2 drug therapy in June 2018.  She reports that around January 2021 she had recurrent hemoptysis.  A culture was obtained and she \"grew a bacteria and was started back on Levaquin and azithromycin.  She then had a repeat sputum done recently that still showed bacteria and she was changed to Biaxin and sulfa " several weeks ago.  She has had no further hemoptysis.  She does not have fever chills night sweats or weight loss.  She is having a horrible metallic taste in her mouth.  She has a nonproductive cough and less she uses her albuterol inhaler then she will bring up mucus.  She does get short of air with stairs or hills but can do her usual activity.  In January of this year she did develop COVID-19 and was treated with monoclonal antibodies.  She did not require hospitalization.      Review of Systems   Constitutional: Negative for unexpected weight change.   HENT: Positive for postnasal drip. Negative for sore throat.         Metallic taste   Eyes:        Glasses   Respiratory: Positive for cough and shortness of breath.    Gastrointestinal: Positive for diarrhea and nausea.   Endocrine: Negative.    Genitourinary: Negative for dysuria.   Musculoskeletal: Positive for arthralgias, back pain and neck pain.   Skin: Negative for rash.   Allergic/Immunologic: Positive for environmental allergies.   Neurological: Positive for dizziness and headaches.   Hematological: Bruises/bleeds easily.   Psychiatric/Behavioral: Negative.          Current Outpatient Medications:   •  albuterol (PROVENTIL HFA;VENTOLIN HFA) 108 (90 Base) MCG/ACT inhaler, Inhale 2 puffs Every 4 (Four) Hours As Needed for Wheezing., Disp: 3 inhaler, Rfl: 3  •  atorvastatin (LIPITOR) 10 MG tablet, Take 10 mg by mouth Daily., Disp: , Rfl:   •  cetirizine (zyrTEC) 5 MG tablet, Take 5 mg by mouth Daily., Disp: , Rfl:   •  clarithromycin XL (BIAXIN XL) 500 MG 24 hr tablet, , Disp: , Rfl:   •  ergocalciferol (ERGOCALCIFEROL) 22659 units capsule, Take 50,000 Units by mouth 1 (One) Time Per Week., Disp: , Rfl:   •  hydrochlorothiazide (MICROZIDE) 12.5 MG capsule, Take 12.5 mg by mouth Every Morning., Disp: , Rfl:   •  meclizine (ANTIVERT) 12.5 MG tablet, Take 12.5 mg by mouth As Needed for dizziness., Disp: , Rfl:   •  nitroglycerin (NITROSTAT) 0.4 MG SL tablet,  Place 0.4 mg under the tongue Every 5 (Five) Minutes As Needed., Disp: , Rfl:   •  pantoprazole (PROTONIX) 40 MG EC tablet, Take 40 mg by mouth Daily., Disp: , Rfl:   •  PREMARIN 0.625 MG/GM vaginal cream, , Disp: , Rfl:   •  rosuvastatin (CRESTOR) 5 MG tablet, Take 5 mg by mouth Every Night., Disp: , Rfl:   •  sulfamethoxazole-trimethoprim (BACTRIM DS,SEPTRA DS) 800-160 MG per tablet, , Disp: , Rfl:   •  SYNTHROID 100 MCG tablet, Take 100 mcg by mouth Daily., Disp: , Rfl:   •  telmisartan (MICARDIS) 40 MG tablet, Take 40 mg by mouth Daily., Disp: , Rfl:   •  traZODone (DESYREL) 50 MG tablet, Take 50 mg by mouth As Needed., Disp: , Rfl:   •  tretinoin (RETIN-A) 0.025 % cream, , Disp: , Rfl:   •  valACYclovir (VALTREX) 1000 MG tablet, Take 1,000 mg by mouth 2 (Two) Times a Day., Disp: , Rfl:     Past Medical History:   Diagnosis Date   • Acne    • Allergic rhinitis    • CAD (coronary artery disease)     1 vessel   • Constipation, chronic    • COPD (chronic obstructive pulmonary disease) (HCC)    • DDD (degenerative disc disease), cervical    • DDD (degenerative disc disease), lumbar    • Dyslipidemia    • Fibrocystic breast changes    • Glaucoma    • Hemorrhoids    • Hypertension    • IBS (irritable bowel syndrome)    • Ocular rosacea    • Osteopenia    • Thyroid cancer (HCC)    • Vertigo, benign positional    • Vitamin D deficiency      Past Surgical History:   Procedure Laterality Date   • BREAST LUMPECTOMY Right     benign   • BRONCHOSCOPY N/A 10/10/2017    Procedure: BRONCHOSCOPY BIOPSY WITH FLUORO;  Surgeon: Mily Cordon MD;  Location: [facility] ENDOSCOPY;  Service:    • CARDIAC CATHETERIZATION  2017    1 vessel 80%, [place]   • CERVICAL SPINE SURGERY      fusion with cage, diskectomy   • COLONOSCOPY  2015   • DENTAL PROCEDURE      root canal   • GLAUCOMA SURGERY      laser for closed angle glaucoma   • HEMORRHOIDECTOMY     • TONSILLECTOMY     • TOTAL THYROIDECTOMY  2001   • VAGINAL HYSTERECTOMY SALPINGO  OOPHORECTOMY       Social History     Socioeconomic History   • Marital status:    • Number of children: 2   Tobacco Use   • Smoking status: Former Smoker     Packs/day: 2.00     Years: 27.00     Pack years: 54.00     Types: Cigarettes   • Smokeless tobacco: Never Used   Substance and Sexual Activity   • Alcohol use: Yes     Alcohol/week: 2.0 standard drinks     Types: 2 Cans of beer per week   • Drug use: Defer   • Sexual activity: Defer     Family History   Problem Relation Age of Onset   • Stroke Mother    • Hypertension Mother    • No Known Problems Father    • No Known Problems Sister    • Rheum arthritis Sister    • Coronary artery disease Brother      Blood pressure 128/78, pulse 76, temperature 97.5 °F (36.4 °C), weight 59.4 kg (131 lb), SpO2 97 %, not currently breastfeeding.    Physical Exam  Constitutional:       Appearance: She is normal weight.   HENT:      Head: Normocephalic and atraumatic.   Eyes:      Conjunctiva/sclera: Conjunctivae normal.   Neck:      Comments: Anterior surgical scar, neck  Cardiovascular:      Rate and Rhythm: Normal rate and regular rhythm.      Heart sounds: No murmur heard.  Pulmonary:      Effort: Pulmonary effort is normal.      Breath sounds: No wheezing or rhonchi.   Abdominal:      Palpations: Abdomen is soft.   Musculoskeletal:      Right lower leg: No edema.      Left lower leg: No edema.   Neurological:      Mental Status: She is alert and oriented to person, place, and time.           PFTs:    August 18, 2022, PFTs FVC 2.40 L, 88%, FEV1 1.73 L, 85% ratio 72%, total lung capacity 3.84 L, 86%, diffusion capacity 16.7, 99%, normal    June 21, 2018, FVC 2.22 L, 78%, FEV1 1.59 L, 74% ratio 72%, total lung capacity 3.71 L, 82%, diffusion capacity 17.4, 99%    Radiology:    August 18, 2022 PA and lateral chest x-ray, linear scarring in the right upper lobe and lingula, no adenopathy or acute infiltrate       CT scan of the chest April 10, 2019  FINDINGS: The lung  parenchyma reveals extensive bronchiectatic changes  identified centrally. The bronchiectatic changes are seen into the  periphery of the inferior aspect of the right upper lobe. The findings  are stable when compared to the prior study with mild bronchiectatic  changes seen in the right middle lobe. There is nodularity identified  along the superior fissure on the right which is stable. Bronchiectatic  changes seen extending into the nodularity. There is scarring and  nodular density seen at the lung apices bilaterally. No progression of  disease. There is no significant interseptal thickening identified. No  subpleural nodule is present. Degenerative changes seen within the  spine. No pleural effusion or pneumothorax. The cardiac chambers are  within normal limits. Minimal coronary artery calcification present. No  bulky hilar or axillary lymphadenopathy. The visualized upper abdomen is  unremarkable.     IMPRESSION:  Stable bronchiectatic changes identified throughout the lung  fields bilaterally with some extension into the inferior aspect of the  right upper lobe. There is small fibronodular density stable within the  posterior aspect of the right upper lung field. There is fibronodular  densities at the lung apices bilaterally. No progression of disease.     D:  04/10/2019  E:  04/10/2019     This report was finalized on 4/10/2019 2:53 PM by Dr. Rosio Weiss MD.    Lab:    Diagnoses and all orders for this visit:    1. Bronchiectasis without complication (HCC) (Primary)  -     Pulmonary Function Test  -     XR Chest PA & Lateral    2. Mycobacterium avium-intracellulare infection (HCC)    3. Hemoptysis        Discussion:     72-year-old woman with previous nodular infiltrate in the right upper lobe, right middle lobe and sputum cultures positive for Mycobacterium AVM complex.  She did successfully undergo at least 18 months of treatment.  Then in January 2021 she had recurrent hemoptysis and was  restarted on Levaquin and azithromycin.  She was intolerant of rifampin.  She had persistent positive cultures and was just recently changed to Bactrim and Biaxin.  At least her chest x-ray really just has a little postinflammatory scarring but no dense consolidation.  CT scan would be more sensitive.  She is not having fever night sweats or weight loss and does not have sputum production unless she uses her inhaler.  What I need to know is if she is smear negative but growing Mycobacterium AVM I will probably recommend waiting watching and holding antibiotic therapy.  If however she is smear positive then I do feel we should proceed with therapy per her infectious disease physician.  If she is smear negative and growing the organism I have also used nebulized amikacin and attempt to decolonize.  Unfortunately at this time I really do not have enough information to make a decision.    -CT scan of the chest this week if possible to compare and see whether she has an active looking infiltrate   -If she has had a more recent CAT scan at [place] I would like a copy of her old scans as well.  I do have her scan from 2019    -Obtain a copy of her sputum test from [facility]    -Schedule follow-up with me in 3 months    Mily Cordon MD

## 2022-08-19 DIAGNOSIS — R04.2 HEMOPTYSIS: ICD-10-CM

## 2022-08-19 DIAGNOSIS — A31.0 MYCOBACTERIUM AVIUM-INTRACELLULARE INFECTION: ICD-10-CM

## 2022-08-19 DIAGNOSIS — J47.9 BRONCHIECTASIS WITHOUT COMPLICATION: Primary | ICD-10-CM

## 2022-08-22 ENCOUNTER — TELEPHONE (OUTPATIENT)
Dept: PULMONOLOGY | Facility: CLINIC | Age: 72
End: 2022-08-22

## 2022-08-22 DIAGNOSIS — B37.0 ORAL CANDIDA: Primary | ICD-10-CM

## 2022-08-22 DIAGNOSIS — Z00.6 EXAMINATION FOR NORMAL COMPARISON OR CONTROL IN CLINICAL RESEARCH: Primary | ICD-10-CM

## 2022-08-22 NOTE — TELEPHONE ENCOUNTER
"Patient called wanting to know if this office would call in some \"pills for oral thrush\" states she's on abx and that usually causes white splotches on the inside of her mouth and mouthwash is rarely effective at treating it.  "

## 2022-09-13 DIAGNOSIS — Z00.6 EXAMINATION FOR NORMAL COMPARISON OR CONTROL IN CLINICAL RESEARCH: Primary | ICD-10-CM

## 2022-10-28 ENCOUNTER — OFFICE VISIT (OUTPATIENT)
Dept: PULMONOLOGY | Facility: CLINIC | Age: 72
End: 2022-10-28

## 2022-10-28 VITALS
DIASTOLIC BLOOD PRESSURE: 66 MMHG | WEIGHT: 131.38 LBS | BODY MASS INDEX: 23.65 KG/M2 | TEMPERATURE: 97 F | RESPIRATION RATE: 16 BRPM | OXYGEN SATURATION: 97 % | SYSTOLIC BLOOD PRESSURE: 106 MMHG | HEART RATE: 58 BPM

## 2022-10-28 DIAGNOSIS — A31.0 MYCOBACTERIUM AVIUM-INTRACELLULARE INFECTION: ICD-10-CM

## 2022-10-28 DIAGNOSIS — R04.2 HEMOPTYSIS: ICD-10-CM

## 2022-10-28 DIAGNOSIS — J47.9 BRONCHIECTASIS WITHOUT COMPLICATION: Primary | ICD-10-CM

## 2022-10-28 DIAGNOSIS — J47.9 BRONCHIECTASIS WITHOUT COMPLICATION: ICD-10-CM

## 2022-10-28 PROCEDURE — 99213 OFFICE O/P EST LOW 20 MIN: CPT | Performed by: INTERNAL MEDICINE

## 2022-10-28 RX ORDER — DOXYCYCLINE HYCLATE 50 MG/1
50 CAPSULE ORAL DAILY
COMMUNITY

## 2022-10-28 NOTE — PROGRESS NOTES
Selina Ruano is a 72 y.o. female here for evaluation of   Chief Complaint   Patient presents with   • Post CT     F/u       Problem list:  1. Mycobacterium avium complex infection; azithromycin, Levaquin, rifampin completed June 2018  2. Hemoptysis, recurred, 1/21  3. Centrilobular emphysema  4. COVID 19 1/22  5. Ocular rosacea  6. Coronary artery disease, single-vessel  7. Allergic rhinitis  8. Acne  9. Dyslipidemia  10. Hypotension  11. Hypothyroid  12. Thyroid cancer/total thyroidectomy  13. Cervical disc disease/cervical fusion with cage and discectomy  14. Lumbar disc disease  15. Fibrocystic breast disease  16. Right breast lumpectomy, benign  17. Glaucoma/laser surgery for closed angle glaucoma  18. Hemorrhoids/hemorrhoidectomy  19. Benign positional vertigo  20. Vitamin D deficiency  21. Osteopenia  22. Bronchoscopy, lavage October 2017  23. Left heart catheterization, 2017, hazard, 80% 1 vessel disease  24. Colonoscopy  25. Tonsillectomy  26. Root canal  27. Vaginal hysterectomy with salpingo-oophorectomy  28. Cataract surgery, 1 and 2, 2022  29. No known drug allergies  30. Previous tobacco, 2 packs per day for 27 years    History of Present Illness    72-year-old woman, remote smoker presented with a chronic cough and found to have right middle lobe chronic infiltrate and some nodules in the right upper lobe.  She did undergo bronchoscopy which revealed Mycobacterium rachel complex.  She had evidence of focal bronchiectasis and some underlying emphysema on CT scan.  She was initially placed on azithromycin, Levaquin and rifampin.  She was intolerant of rifampin because of low white blood cell count and finished a 2 drug regimen June 2018.  She developed recurrent hemoptysis in January 2021.  A culture was obtained and was positive for Mycobacterium rachel complex.  She was placed on azithromycin, Levaquin.  Repeat cultures showed persistent organism and she was changed to clarithromycin and Bactrim in  August 2022.  She currently has a cough productive of clear mucoid secretions.  She does get short of air if she walks up a hill or stairs or if she is carrying an item.  She has not been losing weight and denies fever.  She has had no further hemoptysis.  She did recently send off a sputum for another culture.      Review of Systems   Constitutional: Positive for fatigue. Negative for fever.   Respiratory: Positive for cough and shortness of breath. Negative for wheezing.          Current Outpatient Medications:   •  albuterol (PROVENTIL HFA;VENTOLIN HFA) 108 (90 Base) MCG/ACT inhaler, Inhale 2 puffs Every 4 (Four) Hours As Needed for Wheezing., Disp: 3 inhaler, Rfl: 3  •  atorvastatin (LIPITOR) 10 MG tablet, Take 10 mg by mouth Daily., Disp: , Rfl:   •  cetirizine (zyrTEC) 5 MG tablet, Take 5 mg by mouth Daily., Disp: , Rfl:   •  clarithromycin XL (BIAXIN XL) 500 MG 24 hr tablet, Take 1 mg by mouth 2 (Two) Times a Day., Disp: , Rfl:   •  doxycycline (VIBRAMYCIN) 50 MG capsule, Take 1 capsule by mouth Daily., Disp: , Rfl:   •  ergocalciferol (ERGOCALCIFEROL) 28121 units capsule, Take 50,000 Units by mouth 1 (One) Time Per Week., Disp: , Rfl:   •  hydrochlorothiazide (MICROZIDE) 12.5 MG capsule, Take 12.5 mg by mouth Every Morning., Disp: , Rfl:   •  meclizine (ANTIVERT) 12.5 MG tablet, Take 12.5 mg by mouth As Needed for dizziness., Disp: , Rfl:   •  nitroglycerin (NITROSTAT) 0.4 MG SL tablet, Place 0.4 mg under the tongue Every 5 (Five) Minutes As Needed., Disp: , Rfl:   •  nystatin (MYCOSTATIN) 100,000 unit/mL suspension, Take 5 mL by mouth 4 (Four) Times a Day., Disp: 280 mL, Rfl: 0  •  pantoprazole (PROTONIX) 40 MG EC tablet, Take 40 mg by mouth Daily., Disp: , Rfl:   •  PREMARIN 0.625 MG/GM vaginal cream, , Disp: , Rfl:   •  rosuvastatin (CRESTOR) 5 MG tablet, Take 5 mg by mouth Every Night., Disp: , Rfl:   •  sulfamethoxazole-trimethoprim (BACTRIM DS,SEPTRA DS) 800-160 MG per tablet, Take 1 tablet by mouth  Daily., Disp: , Rfl:   •  SYNTHROID 100 MCG tablet, Take 100 mcg by mouth Daily., Disp: , Rfl:   •  telmisartan (MICARDIS) 40 MG tablet, Take 40 mg by mouth Daily., Disp: , Rfl:   •  traZODone (DESYREL) 50 MG tablet, Take 50 mg by mouth As Needed., Disp: , Rfl:   •  tretinoin (RETIN-A) 0.025 % cream, , Disp: , Rfl:   •  valACYclovir (VALTREX) 1000 MG tablet, Take 1,000 mg by mouth 2 (Two) Times a Day., Disp: , Rfl:     Past Medical History:   Diagnosis Date   • Acne    • Allergic rhinitis    • CAD (coronary artery disease)     1 vessel   • Constipation, chronic    • COPD (chronic obstructive pulmonary disease) (HCC)    • DDD (degenerative disc disease), cervical    • DDD (degenerative disc disease), lumbar    • Dyslipidemia    • Fibrocystic breast changes    • Glaucoma    • Hemorrhoids    • Hypertension    • IBS (irritable bowel syndrome)    • Ocular rosacea    • Osteopenia    • Thyroid cancer (HCC)    • Vertigo, benign positional    • Vitamin D deficiency      Past Surgical History:   Procedure Laterality Date   • BREAST LUMPECTOMY Right     benign   • BRONCHOSCOPY N/A 10/10/2017    Procedure: BRONCHOSCOPY BIOPSY WITH FLUORO;  Surgeon: Mily Cordon MD;  Location: [facility] ENDOSCOPY;  Service:    • CARDIAC CATHETERIZATION  2017    1 vessel 80%, [place]   • CERVICAL SPINE SURGERY      fusion with cage, diskectomy   • COLONOSCOPY  2015   • DENTAL PROCEDURE      root canal   • GLAUCOMA SURGERY      laser for closed angle glaucoma   • HEMORRHOIDECTOMY     • TONSILLECTOMY     • TOTAL THYROIDECTOMY  2001   • VAGINAL HYSTERECTOMY SALPINGO OOPHORECTOMY       Social History     Socioeconomic History   • Marital status:    • Number of children: 2   Tobacco Use   • Smoking status: Former     Packs/day: 2.00     Years: 27.00     Pack years: 54.00     Types: Cigarettes   • Smokeless tobacco: Never   Substance and Sexual Activity   • Alcohol use: Yes     Alcohol/week: 2.0 standard drinks     Types: 2 Cans of beer per  week   • Drug use: Defer   • Sexual activity: Defer     Family History   Problem Relation Age of Onset   • Stroke Mother    • Hypertension Mother    • No Known Problems Father    • No Known Problems Sister    • Rheum arthritis Sister    • Coronary artery disease Brother      Blood pressure 106/66, pulse 58, temperature 97 °F (36.1 °C), resp. rate 16, weight 59.6 kg (131 lb 6 oz), SpO2 97 %, not currently breastfeeding.    Physical Exam  Constitutional:       General: She is in acute distress.      Appearance: She is normal weight.   Cardiovascular:      Rate and Rhythm: Normal rate and regular rhythm.      Heart sounds: No murmur heard.  Pulmonary:      Breath sounds: No wheezing or rhonchi.   Lymphadenopathy:      Cervical: No cervical adenopathy.   Neurological:      Mental Status: She is alert.           PFTs:    August 18th, 2022, FVC 2.40 L, 88%, FEV1 1.73 L, 85% ratio 72%, mid expiratory flow 62%, total lung capacity 3.84 L, 88%, diffusion capacity 16.7, 99%    June 21, 2018, FVC 2.22 L, 76%, FEV1 1.59 L, 74% ratio 72%, mid expiratory flows 56%, total lung capacity 3.71 L, 82%, diffusion capacity 17.4, 99%    Radiology:    CT scan of the chest done in [place] August 30, 2022, pleural thickening in the upper lobes with some noncalcified small pleural plaques, scarring along the minor fissure, several right upper lobe nodules are present 1 measures 6 mm that was slightly larger than previous, improved nodular infiltrates in the right upper lobe and middle lobe    CT scan from 2019 revealed some tree-in-bud nodularity in the right upper lobe and infiltrate in the right middle lobe    Lab: March 8, 2018 bronchial washing positive for Mycobacterium AVM complex, resistant to ethambutol, ciprofloxacin, linezolid, moxifloxacin, streptomycin; intermediate to rifampin, amikacin, right foot Butin; sensitive to rifampin combo assay, clarithromycin, clofazimine    Diagnoses and all orders for this visit:    1.  Bronchiectasis without complication (HCC) (Primary)    2. Mycobacterium avium-intracellulare infection (HCC)        Discussion:     72-year-old woman with some mild emphysema, remote tobacco use, bronchiectasis and MAC recurrent infection here for follow-up.  Her current regimen includes Bactrim, clarithromycin since August.  She is followed by Dr. Mata and Param.  She has had no further hemoptysis.  Repeat sputum has been sent and is pending.  Therapy was restarted in January 2021 after an episode of hemoptysis with sputum culture positive for MAC.  Imaging does look better.  There is questionable increase in one of her right upper lobe nodules that is fairly subtle and I would simply monitor it over time.  I did personally review for old CAT scans from 2020 and 2019 as well as the recent one from August 2022 and can only find a subtle difference in the upper lobe nodules.    -Encouraged her to nebulize twice daily for mucociliary clearance.  She is having difficulty sleeping if she uses it at bedtime so of asked her to use it before dinner and first thing in the morning    -She needs the new COVID-19 vaccine that covers omicron    -On September 29, 2022 she had the Prevnar 20 and the high-dose influenza vaccine    -She is not on any inhaled corticosteroids but has no wheezing so would hold off any maintenance inhalers and just use the nebulizer    -Follow-up with me in 6 months with a chest x-ray, repeat CT scan to evaluate right upper lobe nodules in August or September 2023      Mily Cordon MD

## 2022-12-19 RX ORDER — FLUCONAZOLE 200 MG/1
200 TABLET ORAL DAILY
Qty: 15 TABLET | Refills: 0 | Status: SHIPPED | OUTPATIENT
Start: 2022-12-19

## 2023-04-27 NOTE — PROGRESS NOTES
Selina Ruano is a 72 y.o. female here for evaluation of   No chief complaint on file.      Problem list:  1. Mycobacterium avium complex infection; azithromycin, Levaquin, rifampin completed June 2018  2. Hemoptysis, recurred, 1/21 persistent positive culture, azithromycin, Levaquin; changed to clarithromycin, Bactrim,completed 1/23  3. Centrilobular emphysema  4. COVID 19 1/22  5. Ocular rosacea  6. Coronary artery disease, single-vessel  7. Allergic rhinitis  8. Acne  9. Dyslipidemia  10. Hypotension  11. Hypothyroid  12. Thyroid cancer/total thyroidectomy  13. Cervical disc disease/cervical fusion with cage and discectomy  14. Lumbar disc disease  15. Fibrocystic breast disease  16. Right breast lumpectomy, benign  17. Glaucoma/laser surgery for closed angle glaucoma  18. Hemorrhoids/hemorrhoidectomy  19. Benign positional vertigo  20. Vitamin D deficiency  21. Osteopenia  22. Bronchoscopy, lavage October 2017  23. Left heart catheterization, 2017, hazard, 80% 1 vessel disease  24. Colonoscopy  25. Tonsillectomy  26. Root canal  27. Vaginal hysterectomy with salpingo-oophorectomy  28. Cataract surgery, 1 and 2, 2022  29. No known drug allergies  30. Previous tobacco, 2 packs per day for 27 years    History of Present Illness    72-year-old woman, remote smoker presented with a chronic cough and found to have right middle lobe chronic infiltrate and some nodules in the right upper lobe.  She did undergo bronchoscopy which revealed Mycobacterium rachel complex.  She had evidence of focal bronchiectasis and some underlying emphysema on CT scan.  She was initially placed on azithromycin, Levaquin and rifampin.  She was intolerant of rifampin because of low white blood cell count and finished a 2 drug regimen June 2018.  She developed recurrent hemoptysis in January 2021.  A culture was obtained and was positive for Mycobacterium rachel complex.  She was placed on azithromycin, Levaquin.  Repeat cultures showed  persistent organism and she was changed to clarithromycin and Bactrim in August 2022. She stopped all RX in Jan after sputum was neg.   Currently has increased cough, productive of mucous, clear. Denies hemoptysis. Denies fever, weight loss. Repeat sputum 4-13-23, smear negative, but growing SHU.   She gets recurrent thrush        Review of Systems   Constitutional: Negative for fever and unexpected weight change.   Respiratory: Positive for cough. Negative for wheezing.          Current Outpatient Medications:   •  albuterol (PROVENTIL HFA;VENTOLIN HFA) 108 (90 Base) MCG/ACT inhaler, Inhale 2 puffs Every 4 (Four) Hours As Needed for Wheezing., Disp: 3 inhaler, Rfl: 3  •  atorvastatin (LIPITOR) 10 MG tablet, Take 10 mg by mouth Daily., Disp: , Rfl:   •  cetirizine (zyrTEC) 5 MG tablet, Take 5 mg by mouth Daily., Disp: , Rfl:   •  clarithromycin XL (BIAXIN XL) 500 MG 24 hr tablet, Take 1 mg by mouth 2 (Two) Times a Day., Disp: , Rfl:   •  doxycycline (VIBRAMYCIN) 50 MG capsule, Take 1 capsule by mouth Daily., Disp: , Rfl:   •  ergocalciferol (ERGOCALCIFEROL) 17867 units capsule, Take 50,000 Units by mouth 1 (One) Time Per Week., Disp: , Rfl:   •  fluconazole (DIFLUCAN) 200 MG tablet, Take 1 tablet by mouth Daily., Disp: 15 tablet, Rfl: 0  •  hydrochlorothiazide (MICROZIDE) 12.5 MG capsule, Take 12.5 mg by mouth Every Morning., Disp: , Rfl:   •  meclizine (ANTIVERT) 12.5 MG tablet, Take 12.5 mg by mouth As Needed for dizziness., Disp: , Rfl:   •  nitroglycerin (NITROSTAT) 0.4 MG SL tablet, Place 0.4 mg under the tongue Every 5 (Five) Minutes As Needed., Disp: , Rfl:   •  nystatin (MYCOSTATIN) 100,000 unit/mL suspension, Take 5 mL by mouth 4 (Four) Times a Day., Disp: 280 mL, Rfl: 0  •  pantoprazole (PROTONIX) 40 MG EC tablet, Take 40 mg by mouth Daily., Disp: , Rfl:   •  PREMARIN 0.625 MG/GM vaginal cream, , Disp: , Rfl:   •  rosuvastatin (CRESTOR) 5 MG tablet, Take 5 mg by mouth Every Night., Disp: , Rfl:   •   sulfamethoxazole-trimethoprim (BACTRIM DS,SEPTRA DS) 800-160 MG per tablet, Take 1 tablet by mouth Daily., Disp: , Rfl:   •  SYNTHROID 100 MCG tablet, Take 100 mcg by mouth Daily., Disp: , Rfl:   •  telmisartan (MICARDIS) 40 MG tablet, Take 40 mg by mouth Daily., Disp: , Rfl:   •  traZODone (DESYREL) 50 MG tablet, Take 50 mg by mouth As Needed., Disp: , Rfl:   •  tretinoin (RETIN-A) 0.025 % cream, , Disp: , Rfl:   •  valACYclovir (VALTREX) 1000 MG tablet, Take 1,000 mg by mouth 2 (Two) Times a Day., Disp: , Rfl:     Past Medical History:   Diagnosis Date   • Acne    • Allergic rhinitis    • CAD (coronary artery disease)     1 vessel   • Constipation, chronic    • COPD (chronic obstructive pulmonary disease)    • DDD (degenerative disc disease), cervical    • DDD (degenerative disc disease), lumbar    • Dyslipidemia    • Fibrocystic breast changes    • Glaucoma    • Hemorrhoids    • Hypertension    • IBS (irritable bowel syndrome)    • Ocular rosacea    • Osteopenia    • Thyroid cancer    • Vertigo, benign positional    • Vitamin D deficiency      Past Surgical History:   Procedure Laterality Date   • BREAST LUMPECTOMY Right     benign   • BRONCHOSCOPY N/A 10/10/2017    Procedure: BRONCHOSCOPY BIOPSY WITH FLUORO;  Surgeon: Mily Cordon MD;  Location: [facility] ENDOSCOPY;  Service:    • CARDIAC CATHETERIZATION  2017    1 vessel 80%, [place]   • CERVICAL SPINE SURGERY      fusion with cage, diskectomy   • COLONOSCOPY  2015   • DENTAL PROCEDURE      root canal   • GLAUCOMA SURGERY      laser for closed angle glaucoma   • HEMORRHOIDECTOMY     • TONSILLECTOMY     • TOTAL THYROIDECTOMY  2001   • VAGINAL HYSTERECTOMY SALPINGO OOPHORECTOMY       Social History     Socioeconomic History   • Marital status:    • Number of children: 2   Tobacco Use   • Smoking status: Former     Packs/day: 2.00     Years: 27.00     Pack years: 54.00     Types: Cigarettes   • Smokeless tobacco: Never   Substance and Sexual Activity    • Alcohol use: Yes     Alcohol/week: 2.0 standard drinks     Types: 2 Cans of beer per week   • Drug use: Defer   • Sexual activity: Defer     Family History   Problem Relation Age of Onset   • Stroke Mother    • Hypertension Mother    • No Known Problems Father    • No Known Problems Sister    • Rheum arthritis Sister    • Coronary artery disease Brother      not currently breastfeeding.    Physical Exam  Constitutional:       Appearance: She is normal weight.   HENT:      Head: Atraumatic.   Cardiovascular:      Rate and Rhythm: Normal rate and regular rhythm.      Heart sounds: No murmur heard.  Pulmonary:      Breath sounds: No wheezing.   Abdominal:      General: Bowel sounds are normal.   Skin:     General: Skin is warm and dry.   Neurological:      Mental Status: She is oriented to person, place, and time.           PFTs:    August 18th, 2022, FVC 2.40 L, 88%, FEV1 1.73 L, 85% ratio 72%, mid expiratory flow 62%, total lung capacity 3.84 L, 88%, diffusion capacity 16.7, 99%    June 21, 2018, FVC 2.22 L, 76%, FEV1 1.59 L, 74% ratio 72%, mid expiratory flows 56%, total lung capacity 3.71 L, 82%, diffusion capacity 17.4, 99%    Radiology:    CT scan of the chest done in [place] August 30, 2022, pleural thickening in the upper lobes with some noncalcified small pleural plaques, scarring along the minor fissure, several right upper lobe nodules are present 1 measures 6 mm that was slightly larger than previous, improved nodular infiltrates in the right upper lobe and middle lobe    CT scan from 2019 revealed some tree-in-bud nodularity in the right upper lobe and infiltrate in the right middle lobe    Lab: March 8, 2018 bronchial washing positive for Mycobacterium AVM complex, resistant to ethambutol, ciprofloxacin, linezolid, moxifloxacin, streptomycin; intermediate to rifampin, amikacin, right foot Butin; sensitive to rifampin combo assay, clarithromycin, clofazimine    Diagnoses and all orders for this  visit:    1. Bronchiectasis without complication (Primary)    2. Mycobacterium avium-intracellulare infection        Discussion:     72-year-old woman with some mild emphysema, remote tobacco use, bronchiectasis and MAC recurrent infection here for follow-up. She has been off therapy since Jan and her cough is increased, and repeat sputum is growing MAC.  In the past she has had right upper lobe and right middle lobe tree-in-bud opacities and infiltrates.  She has also had apical scarring bilaterally.    Using inhaler instead of nebulizer, for mucociliary clearance     -CT chest, today if possible  -if disease is focal ,consider lobectomy    -refill albuterol inhaler  -diflucan for thrush    -3 mos        Mily Cordon MD    Addendum: I did personally review her CT scans with radiology.  She has several nodules that have been there and are unchanged and apical scarring that are unchanged but her right middle lobe does look worse peripherally than previous scan from 2019.

## 2023-04-28 ENCOUNTER — OFFICE VISIT (OUTPATIENT)
Dept: PULMONOLOGY | Facility: CLINIC | Age: 73
End: 2023-04-28
Payer: MEDICARE

## 2023-04-28 ENCOUNTER — HOSPITAL ENCOUNTER (OUTPATIENT)
Dept: CT IMAGING | Facility: HOSPITAL | Age: 73
Discharge: HOME | End: 2023-04-28
Payer: MEDICARE

## 2023-04-28 VITALS
BODY MASS INDEX: 23.92 KG/M2 | TEMPERATURE: 94.2 F | DIASTOLIC BLOOD PRESSURE: 70 MMHG | HEART RATE: 65 BPM | WEIGHT: 135 LBS | HEIGHT: 63 IN | SYSTOLIC BLOOD PRESSURE: 118 MMHG | OXYGEN SATURATION: 98 %

## 2023-04-28 DIAGNOSIS — J47.9 BRONCHIECTASIS WITHOUT COMPLICATION: Primary | ICD-10-CM

## 2023-04-28 DIAGNOSIS — J47.9 BRONCHIECTASIS WITHOUT COMPLICATION: ICD-10-CM

## 2023-04-28 DIAGNOSIS — A31.0 MYCOBACTERIUM AVIUM-INTRACELLULARE INFECTION: ICD-10-CM

## 2023-04-28 PROCEDURE — 71250 CT THORAX DX C-: CPT

## 2023-04-28 RX ORDER — METHOCARBAMOL 500 MG/1
TABLET, FILM COATED ORAL
COMMUNITY
Start: 2023-01-03

## 2023-04-28 RX ORDER — TOBRAMYCIN AND DEXAMETHASONE 3; 1 MG/ML; MG/ML
SUSPENSION/ DROPS OPHTHALMIC
COMMUNITY
Start: 2023-04-03

## 2023-04-28 RX ORDER — ALBUTEROL SULFATE 90 UG/1
2 AEROSOL, METERED RESPIRATORY (INHALATION) EVERY 6 HOURS PRN
Qty: 18 G | Refills: 11 | Status: SHIPPED | OUTPATIENT
Start: 2023-04-28

## 2023-04-28 RX ORDER — FLUCONAZOLE 200 MG/1
200 TABLET ORAL DAILY
Qty: 30 TABLET | Refills: 3 | Status: SHIPPED | OUTPATIENT
Start: 2023-04-28

## 2023-04-28 NOTE — LETTER
April 28, 2023       No Recipients    Patient: Selina Ruano   YOB: 1950   Date of Visit: 4/28/2023     Dear Dr. Hernández Recipients:    Thank you for referring Selina Ruano to me for evaluation. Below are the relevant portions of my assessment and plan of care.    If you have questions, please do not hesitate to call me. I look forward to following Selina along with you.         Sincerely,        Mily Cordon MD        CC:   No Recipients      Progress Notes:  Selina Ruano is a 72 y.o. female here for evaluation of   No chief complaint on file.      Problem list:  Mycobacterium avium complex infection; azithromycin, Levaquin, rifampin completed June 2018  Hemoptysis, recurred, 1/21 persistent positive culture, azithromycin, Levaquin; changed to clarithromycin, Bactrim,completed 1/23  Centrilobular emphysema  COVID 19 1/22  Ocular rosacea  Coronary artery disease, single-vessel  Allergic rhinitis  Acne  Dyslipidemia  Hypotension  Hypothyroid  Thyroid cancer/total thyroidectomy  Cervical disc disease/cervical fusion with cage and discectomy  Lumbar disc disease  Fibrocystic breast disease  Right breast lumpectomy, benign  Glaucoma/laser surgery for closed angle glaucoma  Hemorrhoids/hemorrhoidectomy  Benign positional vertigo  Vitamin D deficiency  Osteopenia  Bronchoscopy, lavage October 2017  Left heart catheterization, 2017, hazard, 80% 1 vessel disease  Colonoscopy  Tonsillectomy  Root canal  Vaginal hysterectomy with salpingo-oophorectomy  Cataract surgery, 1 and 2, 2022  No known drug allergies  Previous tobacco, 2 packs per day for 27 years    History of Present Illness    72-year-old woman, remote smoker presented with a chronic cough and found to have right middle lobe chronic infiltrate and some nodules in the right upper lobe.  She did undergo bronchoscopy which revealed Mycobacterium rachel complex.  She had evidence of focal bronchiectasis and some underlying emphysema on CT scan.  She  was initially placed on azithromycin, Levaquin and rifampin.  She was intolerant of rifampin because of low white blood cell count and finished a 2 drug regimen June 2018.  She developed recurrent hemoptysis in January 2021.  A culture was obtained and was positive for Mycobacterium rachel complex.  She was placed on azithromycin, Levaquin.  Repeat cultures showed persistent organism and she was changed to clarithromycin and Bactrim in August 2022. She stopped all RX in Jan after sputum was neg.   Currently has increased cough, productive of mucous, clear. Denies hemoptysis. Denies fever, weight loss. Repeat sputum 4-13-23, smear negative, but growing SHU  She gets recurrent thrush        Review of Systems   Constitutional: Negative for fever and unexpected weight change.   Respiratory: Positive for cough. Negative for wheezing.          Current Outpatient Medications:   •  albuterol (PROVENTIL HFA;VENTOLIN HFA) 108 (90 Base) MCG/ACT inhaler, Inhale 2 puffs Every 4 (Four) Hours As Needed for Wheezing., Disp: 3 inhaler, Rfl: 3  •  atorvastatin (LIPITOR) 10 MG tablet, Take 10 mg by mouth Daily., Disp: , Rfl:   •  cetirizine (zyrTEC) 5 MG tablet, Take 5 mg by mouth Daily., Disp: , Rfl:   •  clarithromycin XL (BIAXIN XL) 500 MG 24 hr tablet, Take 1 mg by mouth 2 (Two) Times a Day., Disp: , Rfl:   •  doxycycline (VIBRAMYCIN) 50 MG capsule, Take 1 capsule by mouth Daily., Disp: , Rfl:   •  ergocalciferol (ERGOCALCIFEROL) 15591 units capsule, Take 50,000 Units by mouth 1 (One) Time Per Week., Disp: , Rfl:   •  fluconazole (DIFLUCAN) 200 MG tablet, Take 1 tablet by mouth Daily., Disp: 15 tablet, Rfl: 0  •  hydrochlorothiazide (MICROZIDE) 12.5 MG capsule, Take 12.5 mg by mouth Every Morning., Disp: , Rfl:   •  meclizine (ANTIVERT) 12.5 MG tablet, Take 12.5 mg by mouth As Needed for dizziness., Disp: , Rfl:   •  nitroglycerin (NITROSTAT) 0.4 MG SL tablet, Place 0.4 mg under the tongue Every 5 (Five) Minutes As Needed., Disp: ,  Rfl:   •  nystatin (MYCOSTATIN) 100,000 unit/mL suspension, Take 5 mL by mouth 4 (Four) Times a Day., Disp: 280 mL, Rfl: 0  •  pantoprazole (PROTONIX) 40 MG EC tablet, Take 40 mg by mouth Daily., Disp: , Rfl:   •  PREMARIN 0.625 MG/GM vaginal cream, , Disp: , Rfl:   •  rosuvastatin (CRESTOR) 5 MG tablet, Take 5 mg by mouth Every Night., Disp: , Rfl:   •  sulfamethoxazole-trimethoprim (BACTRIM DS,SEPTRA DS) 800-160 MG per tablet, Take 1 tablet by mouth Daily., Disp: , Rfl:   •  SYNTHROID 100 MCG tablet, Take 100 mcg by mouth Daily., Disp: , Rfl:   •  telmisartan (MICARDIS) 40 MG tablet, Take 40 mg by mouth Daily., Disp: , Rfl:   •  traZODone (DESYREL) 50 MG tablet, Take 50 mg by mouth As Needed., Disp: , Rfl:   •  tretinoin (RETIN-A) 0.025 % cream, , Disp: , Rfl:   •  valACYclovir (VALTREX) 1000 MG tablet, Take 1,000 mg by mouth 2 (Two) Times a Day., Disp: , Rfl:     Past Medical History:   Diagnosis Date   • Acne    • Allergic rhinitis    • CAD (coronary artery disease)     1 vessel   • Constipation, chronic    • COPD (chronic obstructive pulmonary disease)    • DDD (degenerative disc disease), cervical    • DDD (degenerative disc disease), lumbar    • Dyslipidemia    • Fibrocystic breast changes    • Glaucoma    • Hemorrhoids    • Hypertension    • IBS (irritable bowel syndrome)    • Ocular rosacea    • Osteopenia    • Thyroid cancer    • Vertigo, benign positional    • Vitamin D deficiency      Past Surgical History:   Procedure Laterality Date   • BREAST LUMPECTOMY Right     benign   • BRONCHOSCOPY N/A 10/10/2017    Procedure: BRONCHOSCOPY BIOPSY WITH FLUORO;  Surgeon: Mily Cordon MD;  Location: [facility] ENDOSCOPY;  Service:    • CARDIAC CATHETERIZATION  2017    1 vessel 80%, [place]   • CERVICAL SPINE SURGERY      fusion with cage, diskectomy   • COLONOSCOPY  2015   • DENTAL PROCEDURE      root canal   • GLAUCOMA SURGERY      laser for closed angle glaucoma   • HEMORRHOIDECTOMY     • TONSILLECTOMY     •  TOTAL THYROIDECTOMY  2001   • VAGINAL HYSTERECTOMY SALPINGO OOPHORECTOMY       Social History     Socioeconomic History   • Marital status:    • Number of children: 2   Tobacco Use   • Smoking status: Former     Packs/day: 2.00     Years: 27.00     Pack years: 54.00     Types: Cigarettes   • Smokeless tobacco: Never   Substance and Sexual Activity   • Alcohol use: Yes     Alcohol/week: 2.0 standard drinks     Types: 2 Cans of beer per week   • Drug use: Defer   • Sexual activity: Defer     Family History   Problem Relation Age of Onset   • Stroke Mother    • Hypertension Mother    • No Known Problems Father    • No Known Problems Sister    • Rheum arthritis Sister    • Coronary artery disease Brother      not currently breastfeeding.    Physical Exam  Constitutional:       Appearance: She is normal weight.   HENT:      Head: Atraumatic.   Cardiovascular:      Rate and Rhythm: Normal rate and regular rhythm.      Heart sounds: No murmur heard.  Pulmonary:      Breath sounds: No wheezing.   Abdominal:      General: Bowel sounds are normal.   Skin:     General: Skin is warm and dry.   Neurological:      Mental Status: She is oriented to person, place, and time.           PFTs:    August 18th, 2022, FVC 2.40 L, 88%, FEV1 1.73 L, 85% ratio 72%, mid expiratory flow 62%, total lung capacity 3.84 L, 88%, diffusion capacity 16.7, 99%    June 21, 2018, FVC 2.22 L, 76%, FEV1 1.59 L, 74% ratio 72%, mid expiratory flows 56%, total lung capacity 3.71 L, 82%, diffusion capacity 17.4, 99%    Radiology:    CT scan of the chest done in [place] August 30, 2022, pleural thickening in the upper lobes with some noncalcified small pleural plaques, scarring along the minor fissure, several right upper lobe nodules are present 1 measures 6 mm that was slightly larger than previous, improved nodular infiltrates in the right upper lobe and middle lobe    CT scan from 2019 revealed some tree-in-bud nodularity in the right upper lobe  and infiltrate in the right middle lobe    Lab: March 8, 2018 bronchial washing positive for Mycobacterium AVM complex, resistant to ethambutol, ciprofloxacin, linezolid, moxifloxacin, streptomycin; intermediate to rifampin, amikacin, right foot Butin; sensitive to rifampin combo assay, clarithromycin, clofazimine    Diagnoses and all orders for this visit:    1. Bronchiectasis without complication (Primary)    2. Mycobacterium avium-intracellulare infection        Discussion:     72-year-old woman with some mild emphysema, remote tobacco use, bronchiectasis and MAC recurrent infection here for follow-up. She has been off therapy since Jan and her cough is increased, and repeat sputum is growing MAC.    Using inhaler instead of nebulizer, for mucociliary clearance     -CT chest  -if disease is focal ,consider lobectomy    -refill albuterol inhaler  -diflucan for thrush    -3 mos        Mily Cordon MD

## 2023-05-04 ENCOUNTER — TELEPHONE (OUTPATIENT)
Dept: PULMONOLOGY | Facility: CLINIC | Age: 73
End: 2023-05-04
Payer: MEDICARE

## 2023-05-04 NOTE — TELEPHONE ENCOUNTER
Pt called today requesting a call back @ [phone] regarding her most recent Chest CT results that was performed on 4/28. Was going to inform pt of results but pt is requesting a decision if Dr Cordon is still wanting to do a procedure.

## 2023-05-12 ENCOUNTER — TELEPHONE (OUTPATIENT)
Dept: PULMONOLOGY | Facility: CLINIC | Age: 73
End: 2023-05-12
Payer: MEDICARE

## 2023-05-12 NOTE — TELEPHONE ENCOUNTER
Patient called about her CT scan results.  I did personally look at her CT scan and review it with CT surgery.  Her patchy pneumonias are present but do not appear worse.  At this time I do not think we should pursue lobectomy.  I would like to get her culture results from [place] because if she has a multidrug-resistant organism I would consider lobectomy.  She sees a doctor Nico Godinez for infectious disease in [place] at the [facility]

## 2023-06-13 ENCOUNTER — TELEPHONE (OUTPATIENT)
Dept: PULMONOLOGY | Facility: CLINIC | Age: 73
End: 2023-06-13
Payer: MEDICARE

## 2023-10-02 ENCOUNTER — TELEPHONE (OUTPATIENT)
Dept: PULMONOLOGY | Facility: CLINIC | Age: 73
End: 2023-10-02
Payer: MEDICARE

## 2023-10-02 NOTE — TELEPHONE ENCOUNTER
Patient was evaluated by me in April 2023 for hemoptysis, history of Mycobacterium avium complex.  She has had no further hemoptysis.  She has some nodular changes in the right upper lobe.  She has been treated in [place] by an infectious disease physician.  She stopped her therapy in January 2023.  I had her repeat 3 sputum's and all 3 were negative for MAC.  Her CT scan April 28, 2023 revealed no acute process some scattered nodular changes in the right upper lobe, certainly no cavitary disease.  She lives 3 hours away and called the office and did not want to come in unless she needed to.  With her negative sputum's I would continue to monitor for disease activity.  This can certainly be done by her infectious disease physician.  But if she develops increasing sputum, recurrent hemoptysis, fever weight loss night sweats then she will need to be reevaluated.

## 2024-10-21 ENCOUNTER — ANESTHESIA EVENT (OUTPATIENT)
Dept: SURGERY | Facility: HOSPITAL | Age: 74
End: 2024-10-21
Payer: MEDICARE

## 2024-10-21 ENCOUNTER — HOSPITAL ENCOUNTER (INPATIENT)
Facility: HOSPITAL | Age: 74
LOS: 7 days | Discharge: NURSING, REHAB OR LONG-TERM CARE | DRG: 481 | End: 2024-10-28
Attending: EMERGENCY MEDICINE | Admitting: STUDENT IN AN ORGANIZED HEALTH CARE EDUCATION/TRAINING PROGRAM
Payer: MEDICARE
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
## 2024-10-21 DIAGNOSIS — Z09 POSTOP CHECK: ICD-10-CM

## 2024-10-21 DIAGNOSIS — Z01.818 PREOPERATIVE CLEARANCE: ICD-10-CM

## 2024-10-21 DIAGNOSIS — R00.0 TACHYCARDIA: ICD-10-CM

## 2024-10-21 DIAGNOSIS — S72.341A DISPLACED SPIRAL FRACTURE OF SHAFT OF RIGHT FEMUR, INITIAL ENCOUNTER FOR CLOSED FRACTURE: ICD-10-CM

## 2024-10-21 DIAGNOSIS — S72.331A CLOSED DISPLACED OBLIQUE FRACTURE OF SHAFT OF RIGHT FEMUR, INITIAL ENCOUNTER: Primary | ICD-10-CM

## 2024-10-21 PROBLEM — S72.91XA CLOSED FRACTURE OF RIGHT FEMUR: Status: ACTIVE | Noted: 2024-10-21

## 2024-10-21 LAB
ABORH RETYPE: NORMAL
ALBUMIN SERPL-MCNC: 3.9 G/DL (ref 3.4–4.8)
ALBUMIN/GLOB SERPL: 1.3 RATIO (ref 1.1–2)
ALP SERPL-CCNC: 82 UNIT/L (ref 40–150)
ALT SERPL-CCNC: 23 UNIT/L (ref 0–55)
ANION GAP SERPL CALC-SCNC: 11 MEQ/L
APTT PPP: 23.3 SECONDS (ref 23.2–33.7)
AST SERPL-CCNC: 28 UNIT/L (ref 5–34)
BASOPHILS # BLD AUTO: 0.04 X10(3)/MCL
BASOPHILS NFR BLD AUTO: 0.6 %
BILIRUB SERPL-MCNC: 0.3 MG/DL
BUN SERPL-MCNC: 11.7 MG/DL (ref 9.8–20.1)
CALCIUM SERPL-MCNC: 9.6 MG/DL (ref 8.4–10.2)
CHLORIDE SERPL-SCNC: 106 MMOL/L (ref 98–107)
CO2 SERPL-SCNC: 20 MMOL/L (ref 23–31)
CREAT SERPL-MCNC: 1 MG/DL (ref 0.55–1.02)
CREAT/UREA NIT SERPL: 12
EOSINOPHIL # BLD AUTO: 0.05 X10(3)/MCL (ref 0–0.9)
EOSINOPHIL NFR BLD AUTO: 0.8 %
ERYTHROCYTE [DISTWIDTH] IN BLOOD BY AUTOMATED COUNT: 12.9 % (ref 11.5–17)
GFR SERPLBLD CREATININE-BSD FMLA CKD-EPI: 59 ML/MIN/1.73/M2
GLOBULIN SER-MCNC: 3.1 GM/DL (ref 2.4–3.5)
GLUCOSE SERPL-MCNC: 112 MG/DL (ref 82–115)
GROUP & RH: NORMAL
HCT VFR BLD AUTO: 33.4 % (ref 37–47)
HGB BLD-MCNC: 11.2 G/DL (ref 12–16)
IMM GRANULOCYTES # BLD AUTO: 0.03 X10(3)/MCL (ref 0–0.04)
IMM GRANULOCYTES NFR BLD AUTO: 0.5 %
INDIRECT COOMBS: NORMAL
INR PPP: 0.9
LACTATE SERPL-SCNC: 0.9 MMOL/L (ref 0.5–2.2)
LACTATE SERPL-SCNC: 3.2 MMOL/L (ref 0.5–2.2)
LYMPHOCYTES # BLD AUTO: 1.78 X10(3)/MCL (ref 0.6–4.6)
LYMPHOCYTES NFR BLD AUTO: 28.6 %
MCH RBC QN AUTO: 31.1 PG (ref 27–31)
MCHC RBC AUTO-ENTMCNC: 33.5 G/DL (ref 33–36)
MCV RBC AUTO: 92.8 FL (ref 80–94)
MONOCYTES # BLD AUTO: 0.56 X10(3)/MCL (ref 0.1–1.3)
MONOCYTES NFR BLD AUTO: 9 %
NEUTROPHILS # BLD AUTO: 3.76 X10(3)/MCL (ref 2.1–9.2)
NEUTROPHILS NFR BLD AUTO: 60.5 %
NRBC BLD AUTO-RTO: 0 %
PLATELET # BLD AUTO: 220 X10(3)/MCL (ref 130–400)
PMV BLD AUTO: 8.9 FL (ref 7.4–10.4)
POTASSIUM SERPL-SCNC: 4.3 MMOL/L (ref 3.5–5.1)
PROT SERPL-MCNC: 7 GM/DL (ref 5.8–7.6)
PROTHROMBIN TIME: 12.2 SECONDS (ref 12.5–14.5)
RBC # BLD AUTO: 3.6 X10(6)/MCL (ref 4.2–5.4)
SODIUM SERPL-SCNC: 137 MMOL/L (ref 136–145)
SPECIMEN OUTDATE: NORMAL
WBC # BLD AUTO: 6.22 X10(3)/MCL (ref 4.5–11.5)

## 2024-10-21 PROCEDURE — 96374 THER/PROPH/DIAG INJ IV PUSH: CPT

## 2024-10-21 PROCEDURE — 0QSBXZZ REPOSITION RIGHT LOWER FEMUR, EXTERNAL APPROACH: ICD-10-PCS | Performed by: EMERGENCY MEDICINE

## 2024-10-21 PROCEDURE — 63600175 PHARM REV CODE 636 W HCPCS: Performed by: EMERGENCY MEDICINE

## 2024-10-21 PROCEDURE — 63600175 PHARM REV CODE 636 W HCPCS

## 2024-10-21 PROCEDURE — 25000003 PHARM REV CODE 250

## 2024-10-21 PROCEDURE — 85610 PROTHROMBIN TIME: CPT | Performed by: EMERGENCY MEDICINE

## 2024-10-21 PROCEDURE — 25000003 PHARM REV CODE 250: Performed by: EMERGENCY MEDICINE

## 2024-10-21 PROCEDURE — 86850 RBC ANTIBODY SCREEN: CPT | Performed by: STUDENT IN AN ORGANIZED HEALTH CARE EDUCATION/TRAINING PROGRAM

## 2024-10-21 PROCEDURE — 86901 BLOOD TYPING SEROLOGIC RH(D): CPT | Performed by: STUDENT IN AN ORGANIZED HEALTH CARE EDUCATION/TRAINING PROGRAM

## 2024-10-21 PROCEDURE — 85730 THROMBOPLASTIN TIME PARTIAL: CPT | Performed by: EMERGENCY MEDICINE

## 2024-10-21 PROCEDURE — 83605 ASSAY OF LACTIC ACID: CPT

## 2024-10-21 PROCEDURE — 85025 COMPLETE CBC W/AUTO DIFF WBC: CPT | Performed by: EMERGENCY MEDICINE

## 2024-10-21 PROCEDURE — 99285 EMERGENCY DEPT VISIT HI MDM: CPT | Mod: 25

## 2024-10-21 PROCEDURE — 80053 COMPREHEN METABOLIC PANEL: CPT | Performed by: EMERGENCY MEDICINE

## 2024-10-21 PROCEDURE — 99223 1ST HOSP IP/OBS HIGH 75: CPT | Mod: ,,, | Performed by: NURSE PRACTITIONER

## 2024-10-21 PROCEDURE — 96375 TX/PRO/DX INJ NEW DRUG ADDON: CPT

## 2024-10-21 PROCEDURE — 11000001 HC ACUTE MED/SURG PRIVATE ROOM

## 2024-10-21 RX ORDER — ADHESIVE BANDAGE
30 BANDAGE TOPICAL DAILY PRN
Status: DISCONTINUED | OUTPATIENT
Start: 2024-10-21 | End: 2024-10-28 | Stop reason: HOSPADM

## 2024-10-21 RX ORDER — DOCUSATE SODIUM 100 MG/1
100 CAPSULE, LIQUID FILLED ORAL 2 TIMES DAILY
Status: DISCONTINUED | OUTPATIENT
Start: 2024-10-21 | End: 2024-10-28 | Stop reason: HOSPADM

## 2024-10-21 RX ORDER — POLYETHYLENE GLYCOL 3350 17 G/17G
17 POWDER, FOR SOLUTION ORAL 2 TIMES DAILY
Status: DISCONTINUED | OUTPATIENT
Start: 2024-10-21 | End: 2024-10-28 | Stop reason: HOSPADM

## 2024-10-21 RX ORDER — METHOCARBAMOL 500 MG/1
500 TABLET, FILM COATED ORAL EVERY 8 HOURS
Status: DISCONTINUED | OUTPATIENT
Start: 2024-10-21 | End: 2024-10-28 | Stop reason: HOSPADM

## 2024-10-21 RX ORDER — ONDANSETRON HYDROCHLORIDE 2 MG/ML
4 INJECTION, SOLUTION INTRAVENOUS
Status: COMPLETED | OUTPATIENT
Start: 2024-10-21 | End: 2024-10-21

## 2024-10-21 RX ORDER — ENOXAPARIN SODIUM 100 MG/ML
40 INJECTION SUBCUTANEOUS EVERY 12 HOURS
Status: DISCONTINUED | OUTPATIENT
Start: 2024-10-21 | End: 2024-10-28 | Stop reason: HOSPADM

## 2024-10-21 RX ORDER — MORPHINE SULFATE 4 MG/ML
INJECTION, SOLUTION INTRAMUSCULAR; INTRAVENOUS
Status: DISCONTINUED
Start: 2024-10-21 | End: 2024-10-21 | Stop reason: WASHOUT

## 2024-10-21 RX ORDER — MORPHINE SULFATE 4 MG/ML
4 INJECTION, SOLUTION INTRAMUSCULAR; INTRAVENOUS
Status: COMPLETED | OUTPATIENT
Start: 2024-10-21 | End: 2024-10-21

## 2024-10-21 RX ORDER — GABAPENTIN 300 MG/1
300 CAPSULE ORAL 3 TIMES DAILY
Status: DISCONTINUED | OUTPATIENT
Start: 2024-10-21 | End: 2024-10-28 | Stop reason: HOSPADM

## 2024-10-21 RX ORDER — ONDANSETRON HYDROCHLORIDE 2 MG/ML
INJECTION, SOLUTION INTRAVENOUS
Status: COMPLETED
Start: 2024-10-21 | End: 2024-10-21

## 2024-10-21 RX ORDER — ACETAMINOPHEN 325 MG/1
325 TABLET ORAL
Status: COMPLETED | OUTPATIENT
Start: 2024-10-21 | End: 2024-10-21

## 2024-10-21 RX ORDER — OXYCODONE HYDROCHLORIDE 10 MG/1
10 TABLET ORAL EVERY 4 HOURS PRN
Status: DISCONTINUED | OUTPATIENT
Start: 2024-10-21 | End: 2024-10-28 | Stop reason: HOSPADM

## 2024-10-21 RX ORDER — OXYCODONE HYDROCHLORIDE 5 MG/1
5 TABLET ORAL EVERY 4 HOURS PRN
Status: DISCONTINUED | OUTPATIENT
Start: 2024-10-21 | End: 2024-10-28 | Stop reason: HOSPADM

## 2024-10-21 RX ORDER — ACETAMINOPHEN 325 MG/1
650 TABLET ORAL EVERY 4 HOURS
Status: DISPENSED | OUTPATIENT
Start: 2024-10-21 | End: 2024-10-26

## 2024-10-21 RX ORDER — TALC
6 POWDER (GRAM) TOPICAL NIGHTLY PRN
Status: DISCONTINUED | OUTPATIENT
Start: 2024-10-21 | End: 2024-10-28 | Stop reason: HOSPADM

## 2024-10-21 RX ORDER — METHOCARBAMOL 500 MG/1
500 TABLET, FILM COATED ORAL
Status: COMPLETED | OUTPATIENT
Start: 2024-10-21 | End: 2024-10-21

## 2024-10-21 RX ADMIN — METHOCARBAMOL 500 MG: 500 TABLET ORAL at 11:10

## 2024-10-21 RX ADMIN — GABAPENTIN 300 MG: 300 CAPSULE ORAL at 08:10

## 2024-10-21 RX ADMIN — OXYCODONE HYDROCHLORIDE 10 MG: 10 TABLET ORAL at 08:10

## 2024-10-21 RX ADMIN — POLYETHYLENE GLYCOL 3350 17 G: 17 POWDER, FOR SOLUTION ORAL at 08:10

## 2024-10-21 RX ADMIN — ONDANSETRON 4 MG: 2 INJECTION INTRAMUSCULAR; INTRAVENOUS at 12:10

## 2024-10-21 RX ADMIN — SODIUM CHLORIDE, POTASSIUM CHLORIDE, SODIUM LACTATE AND CALCIUM CHLORIDE 1000 ML: 600; 310; 30; 20 INJECTION, SOLUTION INTRAVENOUS at 12:10

## 2024-10-21 RX ADMIN — ENOXAPARIN SODIUM 40 MG: 40 INJECTION SUBCUTANEOUS at 08:10

## 2024-10-21 RX ADMIN — ONDANSETRON 4 MG: 2 INJECTION INTRAMUSCULAR; INTRAVENOUS at 11:10

## 2024-10-21 RX ADMIN — METHOCARBAMOL 500 MG: 500 TABLET ORAL at 01:10

## 2024-10-21 RX ADMIN — ACETAMINOPHEN 650 MG: 325 TABLET, FILM COATED ORAL at 06:10

## 2024-10-21 RX ADMIN — OXYCODONE HYDROCHLORIDE 10 MG: 10 TABLET ORAL at 04:10

## 2024-10-21 RX ADMIN — ACETAMINOPHEN 650 MG: 325 TABLET, FILM COATED ORAL at 11:10

## 2024-10-21 RX ADMIN — GABAPENTIN 300 MG: 300 CAPSULE ORAL at 01:10

## 2024-10-21 RX ADMIN — DOCUSATE SODIUM 100 MG: 100 CAPSULE, LIQUID FILLED ORAL at 08:10

## 2024-10-21 RX ADMIN — ACETAMINOPHEN 650 MG: 325 TABLET, FILM COATED ORAL at 01:10

## 2024-10-21 RX ADMIN — ONDANSETRON HYDROCHLORIDE 4 MG: 2 INJECTION, SOLUTION INTRAVENOUS at 12:10

## 2024-10-21 RX ADMIN — MORPHINE SULFATE 4 MG: 4 INJECTION INTRAVENOUS at 11:10

## 2024-10-21 RX ADMIN — ACETAMINOPHEN 325MG 325 MG: 325 TABLET ORAL at 01:10

## 2024-10-21 RX ADMIN — ENOXAPARIN SODIUM 40 MG: 40 INJECTION SUBCUTANEOUS at 01:10

## 2024-10-22 ENCOUNTER — ANESTHESIA (OUTPATIENT)
Dept: SURGERY | Facility: HOSPITAL | Age: 74
End: 2024-10-22
Payer: MEDICARE

## 2024-10-22 LAB
ALBUMIN SERPL-MCNC: 3.3 G/DL (ref 3.4–4.8)
ALBUMIN/GLOB SERPL: 1.4 RATIO (ref 1.1–2)
ALP SERPL-CCNC: 67 UNIT/L (ref 40–150)
ALT SERPL-CCNC: 16 UNIT/L (ref 0–55)
ANION GAP SERPL CALC-SCNC: 9 MEQ/L
AST SERPL-CCNC: 22 UNIT/L (ref 5–34)
BASOPHILS # BLD AUTO: 0.03 X10(3)/MCL
BASOPHILS NFR BLD AUTO: 0.5 %
BILIRUB SERPL-MCNC: 0.4 MG/DL
BUN SERPL-MCNC: 16 MG/DL (ref 9.8–20.1)
CALCIUM SERPL-MCNC: 8.5 MG/DL (ref 8.4–10.2)
CHLORIDE SERPL-SCNC: 102 MMOL/L (ref 98–107)
CO2 SERPL-SCNC: 21 MMOL/L (ref 23–31)
CREAT SERPL-MCNC: 1.12 MG/DL (ref 0.55–1.02)
CREAT/UREA NIT SERPL: 14
EOSINOPHIL # BLD AUTO: 0.02 X10(3)/MCL (ref 0–0.9)
EOSINOPHIL NFR BLD AUTO: 0.3 %
ERYTHROCYTE [DISTWIDTH] IN BLOOD BY AUTOMATED COUNT: 13 % (ref 11.5–17)
GFR SERPLBLD CREATININE-BSD FMLA CKD-EPI: 51 ML/MIN/1.73/M2
GLOBULIN SER-MCNC: 2.4 GM/DL (ref 2.4–3.5)
GLUCOSE SERPL-MCNC: 95 MG/DL (ref 82–115)
HCT VFR BLD AUTO: 24.2 % (ref 37–47)
HGB BLD-MCNC: 8.1 G/DL (ref 12–16)
IMM GRANULOCYTES # BLD AUTO: 0.02 X10(3)/MCL (ref 0–0.04)
IMM GRANULOCYTES NFR BLD AUTO: 0.3 %
LYMPHOCYTES # BLD AUTO: 1.88 X10(3)/MCL (ref 0.6–4.6)
LYMPHOCYTES NFR BLD AUTO: 30.9 %
MAGNESIUM SERPL-MCNC: 1.9 MG/DL (ref 1.6–2.6)
MCH RBC QN AUTO: 30.6 PG (ref 27–31)
MCHC RBC AUTO-ENTMCNC: 33.5 G/DL (ref 33–36)
MCV RBC AUTO: 91.3 FL (ref 80–94)
MONOCYTES # BLD AUTO: 0.64 X10(3)/MCL (ref 0.1–1.3)
MONOCYTES NFR BLD AUTO: 10.5 %
NEUTROPHILS # BLD AUTO: 3.5 X10(3)/MCL (ref 2.1–9.2)
NEUTROPHILS NFR BLD AUTO: 57.5 %
NRBC BLD AUTO-RTO: 0 %
OHS QRS DURATION: 78 MS
OHS QRS DURATION: 78 MS
OHS QTC CALCULATION: 402 MS
OHS QTC CALCULATION: 418 MS
PHOSPHATE SERPL-MCNC: 5 MG/DL (ref 2.3–4.7)
PLATELET # BLD AUTO: 185 X10(3)/MCL (ref 130–400)
PMV BLD AUTO: 9 FL (ref 7.4–10.4)
POTASSIUM SERPL-SCNC: 4.7 MMOL/L (ref 3.5–5.1)
PROT SERPL-MCNC: 5.7 GM/DL (ref 5.8–7.6)
RBC # BLD AUTO: 2.65 X10(6)/MCL (ref 4.2–5.4)
SODIUM SERPL-SCNC: 132 MMOL/L (ref 136–145)
T4 FREE SERPL-MCNC: 1.11 NG/DL (ref 0.7–1.48)
TSH SERPL-ACNC: 1.16 UIU/ML (ref 0.35–4.94)
WBC # BLD AUTO: 6.09 X10(3)/MCL (ref 4.5–11.5)

## 2024-10-22 PROCEDURE — 36000710: Performed by: ORTHOPAEDIC SURGERY

## 2024-10-22 PROCEDURE — 99233 SBSQ HOSP IP/OBS HIGH 50: CPT | Mod: 57,ICN,, | Performed by: ORTHOPAEDIC SURGERY

## 2024-10-22 PROCEDURE — 0QS804Z REPOSITION RIGHT FEMORAL SHAFT WITH INTERNAL FIXATION DEVICE, OPEN APPROACH: ICD-10-PCS | Performed by: ORTHOPAEDIC SURGERY

## 2024-10-22 PROCEDURE — 27506 TREATMENT OF THIGH FRACTURE: CPT | Mod: RT,,, | Performed by: ORTHOPAEDIC SURGERY

## 2024-10-22 PROCEDURE — 25000003 PHARM REV CODE 250: Performed by: ORTHOPAEDIC SURGERY

## 2024-10-22 PROCEDURE — 93005 ELECTROCARDIOGRAM TRACING: CPT

## 2024-10-22 PROCEDURE — 84439 ASSAY OF FREE THYROXINE: CPT | Performed by: NURSE PRACTITIONER

## 2024-10-22 PROCEDURE — 36000711: Performed by: ORTHOPAEDIC SURGERY

## 2024-10-22 PROCEDURE — 25000003 PHARM REV CODE 250: Performed by: NURSE ANESTHETIST, CERTIFIED REGISTERED

## 2024-10-22 PROCEDURE — 63600175 PHARM REV CODE 636 W HCPCS: Performed by: PHYSICIAN ASSISTANT

## 2024-10-22 PROCEDURE — 37000009 HC ANESTHESIA EA ADD 15 MINS: Performed by: ORTHOPAEDIC SURGERY

## 2024-10-22 PROCEDURE — 21400001 HC TELEMETRY ROOM

## 2024-10-22 PROCEDURE — 85025 COMPLETE CBC W/AUTO DIFF WBC: CPT

## 2024-10-22 PROCEDURE — 93010 ELECTROCARDIOGRAM REPORT: CPT | Mod: ,,, | Performed by: INTERNAL MEDICINE

## 2024-10-22 PROCEDURE — 27506 TREATMENT OF THIGH FRACTURE: CPT | Mod: AS,RT,,

## 2024-10-22 PROCEDURE — 27201423 OPTIME MED/SURG SUP & DEVICES STERILE SUPPLY: Performed by: ORTHOPAEDIC SURGERY

## 2024-10-22 PROCEDURE — 80053 COMPREHEN METABOLIC PANEL: CPT

## 2024-10-22 PROCEDURE — 25000003 PHARM REV CODE 250

## 2024-10-22 PROCEDURE — 11000001 HC ACUTE MED/SURG PRIVATE ROOM

## 2024-10-22 PROCEDURE — 84100 ASSAY OF PHOSPHORUS: CPT

## 2024-10-22 PROCEDURE — 25000003 PHARM REV CODE 250: Performed by: NURSE PRACTITIONER

## 2024-10-22 PROCEDURE — 63600175 PHARM REV CODE 636 W HCPCS

## 2024-10-22 PROCEDURE — 63600175 PHARM REV CODE 636 W HCPCS: Performed by: ANESTHESIOLOGY

## 2024-10-22 PROCEDURE — 0QS806Z REPOSITION RIGHT FEMORAL SHAFT WITH INTRAMEDULLARY INTERNAL FIXATION DEVICE, OPEN APPROACH: ICD-10-PCS | Performed by: ORTHOPAEDIC SURGERY

## 2024-10-22 PROCEDURE — 37000008 HC ANESTHESIA 1ST 15 MINUTES: Performed by: ORTHOPAEDIC SURGERY

## 2024-10-22 PROCEDURE — 71000039 HC RECOVERY, EACH ADD'L HOUR: Performed by: ORTHOPAEDIC SURGERY

## 2024-10-22 PROCEDURE — 71000033 HC RECOVERY, INTIAL HOUR: Performed by: ORTHOPAEDIC SURGERY

## 2024-10-22 PROCEDURE — 63600175 PHARM REV CODE 636 W HCPCS: Performed by: ORTHOPAEDIC SURGERY

## 2024-10-22 PROCEDURE — 36415 COLL VENOUS BLD VENIPUNCTURE: CPT

## 2024-10-22 PROCEDURE — C1713 ANCHOR/SCREW BN/BN,TIS/BN: HCPCS | Performed by: ORTHOPAEDIC SURGERY

## 2024-10-22 PROCEDURE — 84443 ASSAY THYROID STIM HORMONE: CPT | Performed by: NURSE PRACTITIONER

## 2024-10-22 PROCEDURE — 83735 ASSAY OF MAGNESIUM: CPT

## 2024-10-22 PROCEDURE — 63600175 PHARM REV CODE 636 W HCPCS: Performed by: NURSE PRACTITIONER

## 2024-10-22 PROCEDURE — 63600175 PHARM REV CODE 636 W HCPCS: Performed by: NURSE ANESTHETIST, CERTIFIED REGISTERED

## 2024-10-22 PROCEDURE — C1769 GUIDE WIRE: HCPCS | Performed by: ORTHOPAEDIC SURGERY

## 2024-10-22 DEVICE — IMPLANTABLE DEVICE: Type: IMPLANTABLE DEVICE | Site: FEMUR | Status: FUNCTIONAL

## 2024-10-22 DEVICE — SCREW LOK IM NAIL XL25 50MM: Type: IMPLANTABLE DEVICE | Site: FEMUR | Status: FUNCTIONAL

## 2024-10-22 DEVICE — SCREW LOK XL25 5X32MM: Type: IMPLANTABLE DEVICE | Site: FEMUR | Status: FUNCTIONAL

## 2024-10-22 DEVICE — SCREW BONE LOCK IM NAIL 34X5MM: Type: IMPLANTABLE DEVICE | Site: FEMUR | Status: FUNCTIONAL

## 2024-10-22 DEVICE — CABLE W/CRIMP STRL 1.7MM: Type: IMPLANTABLE DEVICE | Site: FEMUR | Status: FUNCTIONAL

## 2024-10-22 RX ORDER — MEPERIDINE HYDROCHLORIDE 25 MG/ML
12.5 INJECTION INTRAMUSCULAR; INTRAVENOUS; SUBCUTANEOUS EVERY 10 MIN PRN
Status: DISCONTINUED | OUTPATIENT
Start: 2024-10-22 | End: 2024-10-22 | Stop reason: HOSPADM

## 2024-10-22 RX ORDER — SODIUM CHLORIDE 9 MG/ML
INJECTION, SOLUTION INTRAVENOUS CONTINUOUS
Status: DISCONTINUED | OUTPATIENT
Start: 2024-10-22 | End: 2024-10-23

## 2024-10-22 RX ORDER — GLUCAGON 1 MG
1 KIT INJECTION
Status: DISCONTINUED | OUTPATIENT
Start: 2024-10-22 | End: 2024-10-22 | Stop reason: HOSPADM

## 2024-10-22 RX ORDER — DEXAMETHASONE SODIUM PHOSPHATE 4 MG/ML
INJECTION, SOLUTION INTRA-ARTICULAR; INTRALESIONAL; INTRAMUSCULAR; INTRAVENOUS; SOFT TISSUE
Status: DISCONTINUED | OUTPATIENT
Start: 2024-10-22 | End: 2024-10-22

## 2024-10-22 RX ORDER — OXYCODONE AND ACETAMINOPHEN 5; 325 MG/1; MG/1
1 TABLET ORAL
Status: DISCONTINUED | OUTPATIENT
Start: 2024-10-22 | End: 2024-10-22 | Stop reason: HOSPADM

## 2024-10-22 RX ORDER — ESMOLOL HYDROCHLORIDE 10 MG/ML
INJECTION INTRAVENOUS
Status: DISCONTINUED | OUTPATIENT
Start: 2024-10-22 | End: 2024-10-22

## 2024-10-22 RX ORDER — METOPROLOL TARTRATE 25 MG/1
12.5 TABLET ORAL 2 TIMES DAILY
Status: DISCONTINUED | OUTPATIENT
Start: 2024-10-22 | End: 2024-10-25

## 2024-10-22 RX ORDER — PHENYLEPHRINE HYDROCHLORIDE 10 MG/ML
INJECTION INTRAVENOUS
Status: DISCONTINUED | OUTPATIENT
Start: 2024-10-22 | End: 2024-10-22

## 2024-10-22 RX ORDER — ONDANSETRON HYDROCHLORIDE 2 MG/ML
4 INJECTION, SOLUTION INTRAVENOUS EVERY 6 HOURS PRN
Status: DISCONTINUED | OUTPATIENT
Start: 2024-10-22 | End: 2024-10-28 | Stop reason: HOSPADM

## 2024-10-22 RX ORDER — IPRATROPIUM BROMIDE AND ALBUTEROL SULFATE 2.5; .5 MG/3ML; MG/3ML
3 SOLUTION RESPIRATORY (INHALATION) ONCE AS NEEDED
Status: DISCONTINUED | OUTPATIENT
Start: 2024-10-22 | End: 2024-10-22 | Stop reason: HOSPADM

## 2024-10-22 RX ORDER — KETOROLAC TROMETHAMINE 30 MG/ML
30 INJECTION, SOLUTION INTRAMUSCULAR; INTRAVENOUS ONCE AS NEEDED
Status: DISCONTINUED | OUTPATIENT
Start: 2024-10-22 | End: 2024-10-22 | Stop reason: HOSPADM

## 2024-10-22 RX ORDER — ROCURONIUM BROMIDE 10 MG/ML
INJECTION, SOLUTION INTRAVENOUS
Status: DISCONTINUED | OUTPATIENT
Start: 2024-10-22 | End: 2024-10-22

## 2024-10-22 RX ORDER — PROPOFOL 10 MG/ML
VIAL (ML) INTRAVENOUS
Status: DISCONTINUED | OUTPATIENT
Start: 2024-10-22 | End: 2024-10-22

## 2024-10-22 RX ORDER — EPHEDRINE SULFATE 50 MG/ML
INJECTION, SOLUTION INTRAVENOUS
Status: DISCONTINUED | OUTPATIENT
Start: 2024-10-22 | End: 2024-10-22

## 2024-10-22 RX ORDER — METOPROLOL TARTRATE 1 MG/ML
5 INJECTION, SOLUTION INTRAVENOUS ONCE
Status: DISCONTINUED | OUTPATIENT
Start: 2024-10-22 | End: 2024-10-22 | Stop reason: HOSPADM

## 2024-10-22 RX ORDER — SODIUM CHLORIDE, SODIUM LACTATE, POTASSIUM CHLORIDE, CALCIUM CHLORIDE 600; 310; 30; 20 MG/100ML; MG/100ML; MG/100ML; MG/100ML
125 INJECTION, SOLUTION INTRAVENOUS CONTINUOUS
Status: ACTIVE | OUTPATIENT
Start: 2024-10-22 | End: 2024-10-22

## 2024-10-22 RX ORDER — DIPHENHYDRAMINE HYDROCHLORIDE 50 MG/ML
12.5 INJECTION INTRAMUSCULAR; INTRAVENOUS ONCE AS NEEDED
Status: DISCONTINUED | OUTPATIENT
Start: 2024-10-22 | End: 2024-10-22 | Stop reason: HOSPADM

## 2024-10-22 RX ORDER — HYDROMORPHONE HYDROCHLORIDE 2 MG/ML
0.4 INJECTION, SOLUTION INTRAMUSCULAR; INTRAVENOUS; SUBCUTANEOUS EVERY 10 MIN PRN
Status: DISCONTINUED | OUTPATIENT
Start: 2024-10-22 | End: 2024-10-22 | Stop reason: HOSPADM

## 2024-10-22 RX ORDER — GLYCOPYRROLATE 0.2 MG/ML
INJECTION INTRAMUSCULAR; INTRAVENOUS
Status: DISCONTINUED | OUTPATIENT
Start: 2024-10-22 | End: 2024-10-22

## 2024-10-22 RX ORDER — CEFAZOLIN SODIUM 1 G/3ML
INJECTION, POWDER, FOR SOLUTION INTRAMUSCULAR; INTRAVENOUS
Status: DISCONTINUED | OUTPATIENT
Start: 2024-10-22 | End: 2024-10-22

## 2024-10-22 RX ORDER — ONDANSETRON HYDROCHLORIDE 2 MG/ML
4 INJECTION, SOLUTION INTRAVENOUS ONCE AS NEEDED
Status: DISCONTINUED | OUTPATIENT
Start: 2024-10-22 | End: 2024-10-22 | Stop reason: HOSPADM

## 2024-10-22 RX ORDER — CEFAZOLIN 2 G/1
2 INJECTION, POWDER, FOR SOLUTION INTRAMUSCULAR; INTRAVENOUS
Status: DISPENSED | OUTPATIENT
Start: 2024-10-22 | End: 2024-10-23

## 2024-10-22 RX ORDER — MAGNESIUM SULFATE HEPTAHYDRATE 40 MG/ML
2 INJECTION, SOLUTION INTRAVENOUS ONCE
Status: COMPLETED | OUTPATIENT
Start: 2024-10-22 | End: 2024-10-22

## 2024-10-22 RX ORDER — FENTANYL CITRATE 50 UG/ML
INJECTION, SOLUTION INTRAMUSCULAR; INTRAVENOUS
Status: DISCONTINUED | OUTPATIENT
Start: 2024-10-22 | End: 2024-10-22

## 2024-10-22 RX ORDER — CEFAZOLIN SODIUM 2 G/50ML
2 SOLUTION INTRAVENOUS
Status: DISCONTINUED | OUTPATIENT
Start: 2024-10-22 | End: 2024-10-22 | Stop reason: HOSPADM

## 2024-10-22 RX ORDER — MORPHINE SULFATE 4 MG/ML
4 INJECTION, SOLUTION INTRAMUSCULAR; INTRAVENOUS EVERY 6 HOURS PRN
Status: DISCONTINUED | OUTPATIENT
Start: 2024-10-22 | End: 2024-10-28 | Stop reason: HOSPADM

## 2024-10-22 RX ORDER — ONDANSETRON HYDROCHLORIDE 2 MG/ML
INJECTION, SOLUTION INTRAVENOUS
Status: DISCONTINUED | OUTPATIENT
Start: 2024-10-22 | End: 2024-10-22

## 2024-10-22 RX ORDER — LIDOCAINE HYDROCHLORIDE 20 MG/ML
INJECTION, SOLUTION EPIDURAL; INFILTRATION; INTRACAUDAL; PERINEURAL
Status: DISCONTINUED | OUTPATIENT
Start: 2024-10-22 | End: 2024-10-22

## 2024-10-22 RX ADMIN — GABAPENTIN 300 MG: 300 CAPSULE ORAL at 09:10

## 2024-10-22 RX ADMIN — ONDANSETRON 4 MG: 2 INJECTION INTRAMUSCULAR; INTRAVENOUS at 12:10

## 2024-10-22 RX ADMIN — ACETAMINOPHEN 650 MG: 325 TABLET, FILM COATED ORAL at 03:10

## 2024-10-22 RX ADMIN — PHENYLEPHRINE HYDROCHLORIDE 100 MCG: 10 INJECTION INTRAVENOUS at 11:10

## 2024-10-22 RX ADMIN — METHOCARBAMOL 500 MG: 500 TABLET ORAL at 03:10

## 2024-10-22 RX ADMIN — OXYCODONE HYDROCHLORIDE 10 MG: 10 TABLET ORAL at 12:10

## 2024-10-22 RX ADMIN — HYDROMORPHONE HYDROCHLORIDE 0.4 MG: 2 INJECTION INTRAMUSCULAR; INTRAVENOUS; SUBCUTANEOUS at 01:10

## 2024-10-22 RX ADMIN — SODIUM CHLORIDE, POTASSIUM CHLORIDE, SODIUM LACTATE AND CALCIUM CHLORIDE 125 ML/HR: 600; 310; 30; 20 INJECTION, SOLUTION INTRAVENOUS at 03:10

## 2024-10-22 RX ADMIN — ROCURONIUM BROMIDE 50 MG: 10 SOLUTION INTRAVENOUS at 10:10

## 2024-10-22 RX ADMIN — ESMOLOL HYDROCHLORIDE 20 MG: 100 INJECTION, SOLUTION INTRAVENOUS at 12:10

## 2024-10-22 RX ADMIN — SUGAMMADEX 200 MG: 100 INJECTION, SOLUTION INTRAVENOUS at 12:10

## 2024-10-22 RX ADMIN — FENTANYL CITRATE 50 MCG: 50 INJECTION, SOLUTION INTRAMUSCULAR; INTRAVENOUS at 10:10

## 2024-10-22 RX ADMIN — ESMOLOL HYDROCHLORIDE 10 MG: 100 INJECTION, SOLUTION INTRAVENOUS at 12:10

## 2024-10-22 RX ADMIN — ONDANSETRON 4 MG: 2 INJECTION INTRAMUSCULAR; INTRAVENOUS at 03:10

## 2024-10-22 RX ADMIN — METHOCARBAMOL 500 MG: 500 TABLET ORAL at 05:10

## 2024-10-22 RX ADMIN — GABAPENTIN 300 MG: 300 CAPSULE ORAL at 03:10

## 2024-10-22 RX ADMIN — METHOCARBAMOL 500 MG: 500 TABLET ORAL at 09:10

## 2024-10-22 RX ADMIN — GLYCOPYRROLATE 0.2 MG: 0.2 INJECTION INTRAMUSCULAR; INTRAVENOUS at 10:10

## 2024-10-22 RX ADMIN — PHENYLEPHRINE HYDROCHLORIDE 100 MCG: 10 INJECTION INTRAVENOUS at 12:10

## 2024-10-22 RX ADMIN — ACETAMINOPHEN 650 MG: 325 TABLET, FILM COATED ORAL at 09:10

## 2024-10-22 RX ADMIN — DEXAMETHASONE SODIUM PHOSPHATE 4 MG: 4 INJECTION, SOLUTION INTRA-ARTICULAR; INTRALESIONAL; INTRAMUSCULAR; INTRAVENOUS; SOFT TISSUE at 11:10

## 2024-10-22 RX ADMIN — LIDOCAINE HYDROCHLORIDE 50 MG: 20 INJECTION, SOLUTION INTRAVENOUS at 12:10

## 2024-10-22 RX ADMIN — SODIUM CHLORIDE: 9 INJECTION, SOLUTION INTRAVENOUS at 10:10

## 2024-10-22 RX ADMIN — MAGNESIUM SULFATE HEPTAHYDRATE 2 G: 40 INJECTION, SOLUTION INTRAVENOUS at 03:10

## 2024-10-22 RX ADMIN — FENTANYL CITRATE 50 MCG: 50 INJECTION, SOLUTION INTRAMUSCULAR; INTRAVENOUS at 12:10

## 2024-10-22 RX ADMIN — ACETAMINOPHEN 650 MG: 325 TABLET, FILM COATED ORAL at 05:10

## 2024-10-22 RX ADMIN — EPHEDRINE SULFATE 25 MG: 50 INJECTION, SOLUTION INTRAVENOUS at 11:10

## 2024-10-22 RX ADMIN — CEFAZOLIN 2 G: 2 INJECTION, POWDER, FOR SOLUTION INTRAMUSCULAR; INTRAVENOUS at 09:10

## 2024-10-22 RX ADMIN — EPHEDRINE SULFATE 25 MG: 50 INJECTION INTRAVENOUS at 11:10

## 2024-10-22 RX ADMIN — LIDOCAINE HYDROCHLORIDE 40 MG: 20 INJECTION, SOLUTION INTRAVENOUS at 10:10

## 2024-10-22 RX ADMIN — DOCUSATE SODIUM 100 MG: 100 CAPSULE, LIQUID FILLED ORAL at 09:10

## 2024-10-22 RX ADMIN — ENOXAPARIN SODIUM 40 MG: 40 INJECTION SUBCUTANEOUS at 09:10

## 2024-10-22 RX ADMIN — ONDANSETRON 4 MG: 2 INJECTION INTRAMUSCULAR; INTRAVENOUS at 09:10

## 2024-10-22 RX ADMIN — METOPROLOL TARTRATE 12.5 MG: 25 TABLET, FILM COATED ORAL at 05:10

## 2024-10-22 RX ADMIN — PROPOFOL 75 MG: 10 INJECTION, EMULSION INTRAVENOUS at 10:10

## 2024-10-22 RX ADMIN — CEFAZOLIN 2 G: 330 INJECTION, POWDER, FOR SOLUTION INTRAMUSCULAR; INTRAVENOUS at 11:10

## 2024-10-22 NOTE — ANESTHESIA PREPROCEDURE EVALUATION
10/22/2024  Jerilyn Fernandez is a 74 y.o., female for ORIF femur.      Pre-op Assessment    I have reviewed the Patient Summary Reports.     I have reviewed the Nursing Notes. I have reviewed the NPO Status.   I have reviewed the Medications.     Review of Systems  Anesthesia Hx:  No problems with previous Anesthesia             Denies Family Hx of Anesthesia complications.    Denies Personal Hx of Anesthesia complications.                    Social:  Non-Smoker, No Alcohol Use       Hematology/Oncology:  Hematology Normal   Oncology Normal                                   EENT/Dental:  EENT/Dental Normal           Cardiovascular:  Cardiovascular Normal                                              Pulmonary:  Pulmonary Normal                       Renal/:  Renal/ Normal                 Hepatic/GI:  Hepatic/GI Normal                    Neurological:  Neurology Normal                                      Endocrine:  Endocrine Normal            Dermatological:  Skin Normal    Psych:  Psychiatric Normal                    Physical Exam  General: Well nourished, Cooperative, Alert and Oriented    Airway:  Mallampati: II / II  Mouth Opening: Normal  TM Distance: Normal  Tongue: Normal  Neck ROM: Normal ROM    Dental:  Intact        Anesthesia Plan  Type of Anesthesia, risks & benefits discussed:    Anesthesia Type: Gen ETT, Gen Supraglottic Airway, Gen Natural Airway  Intra-op Monitoring Plan: Standard ASA Monitors  Post Op Pain Control Plan: IV/PO Opioids PRN  Induction:  IV  Airway Plan: Direct, Post-Induction  Informed Consent: Informed consent signed with the Patient and all parties understand the risks and agree with anesthesia plan.  All questions answered.   ASA Score: 2  Day of Surgery Review of History & Physical: I have interviewed and examined the patient. I have reviewed the patient's H&P dated:      Ready For Surgery From Anesthesia Perspective.     .

## 2024-10-22 NOTE — ANESTHESIA PROCEDURE NOTES
Intubation    Date/Time: 10/22/2024 11:00 AM    Performed by: Devendra Fountain CRNA  Authorized by: Taye Barron MD    Intubation:     Induction:  Intravenous    Intubated:  Postinduction    Mask Ventilation:  Easy mask    Attempts:  1    Attempted By:  CRNA    Method of Intubation:  Direct    Blade:  [place] 2    Laryngeal View Grade: Grade I - full view of cords      Difficult Airway Encountered?: No      Complications:  None    Airway Device:  Oral endotracheal tube    Airway Device Size:  7.0    Style/Cuff Inflation:  Cuffed (inflated to minimal occlusive pressure)    Inflation Amount (mL):  9    Tube secured:  22    Secured at:  The lips    Placement Verified By:  Capnometry    Complicating Factors:  None    Findings Post-Intubation:  BS equal bilateral and atraumatic/condition of teeth unchanged

## 2024-10-22 NOTE — TRANSFER OF CARE
"Anesthesia Transfer of Care Note    Patient: Jerilyn Fernandez    Procedure(s) Performed: Procedure(s) (LRB):  INSERTION, INTRAMEDULLARY BHARGAVI, FEMUR, DISTAL, RETROGRADE (Right)    Patient location: PACU    Anesthesia Type: general    Transport from OR: Transported from OR on room air with adequate spontaneous ventilation    Post pain: adequate analgesia    Post assessment: no apparent anesthetic complications and tolerated procedure well    Post vital signs: stable    Level of consciousness: awake and alert    Nausea/Vomiting: no nausea/vomiting    Complications: none    Transfer of care protocol was followed    Last vitals: Visit Vitals  BP (!) 100/55 (BP Location: Right arm, Patient Position: Lying)   Pulse 83   Temp 37.2 °C (99 °F) (Oral)   Resp 18   Ht 5' 2" (1.575 m)   Wt 61.2 kg (134 lb 14.7 oz)   SpO2 (!) 94%   BMI 24.68 kg/m²     "

## 2024-10-23 LAB
ABO + RH BLD: NORMAL
ABO + RH BLD: NORMAL
ALBUMIN SERPL-MCNC: 2.9 G/DL (ref 3.4–4.8)
ALBUMIN/GLOB SERPL: 1.2 RATIO (ref 1.1–2)
ALP SERPL-CCNC: 57 UNIT/L (ref 40–150)
ALT SERPL-CCNC: 10 UNIT/L (ref 0–55)
ANION GAP SERPL CALC-SCNC: 6 MEQ/L
AST SERPL-CCNC: 20 UNIT/L (ref 5–34)
BACTERIA #/AREA URNS AUTO: ABNORMAL /HPF
BASOPHILS # BLD AUTO: 0.01 X10(3)/MCL
BASOPHILS # BLD AUTO: 0.01 X10(3)/MCL
BASOPHILS NFR BLD AUTO: 0.1 %
BASOPHILS NFR BLD AUTO: 0.2 %
BILIRUB SERPL-MCNC: 0.4 MG/DL
BILIRUB UR QL STRIP.AUTO: NEGATIVE
BLD PROD TYP BPU: NORMAL
BLD PROD TYP BPU: NORMAL
BLOOD UNIT EXPIRATION DATE: NORMAL
BLOOD UNIT EXPIRATION DATE: NORMAL
BLOOD UNIT TYPE CODE: 6200
BLOOD UNIT TYPE CODE: 6200
BUN SERPL-MCNC: 17 MG/DL (ref 9.8–20.1)
CALCIUM SERPL-MCNC: 8.1 MG/DL (ref 8.4–10.2)
CHLORIDE SERPL-SCNC: 101 MMOL/L (ref 98–107)
CLARITY UR: CLEAR
CO2 SERPL-SCNC: 23 MMOL/L (ref 23–31)
COLOR UR AUTO: ABNORMAL
CREAT SERPL-MCNC: 1.08 MG/DL (ref 0.55–1.02)
CREAT/UREA NIT SERPL: 16
CROSSMATCH INTERPRETATION: NORMAL
CROSSMATCH INTERPRETATION: NORMAL
DISPENSE STATUS: NORMAL
DISPENSE STATUS: NORMAL
EOSINOPHIL # BLD AUTO: 0.02 X10(3)/MCL (ref 0–0.9)
EOSINOPHIL # BLD AUTO: 0.03 X10(3)/MCL (ref 0–0.9)
EOSINOPHIL NFR BLD AUTO: 0.3 %
EOSINOPHIL NFR BLD AUTO: 0.5 %
ERYTHROCYTE [DISTWIDTH] IN BLOOD BY AUTOMATED COUNT: 13 % (ref 11.5–17)
ERYTHROCYTE [DISTWIDTH] IN BLOOD BY AUTOMATED COUNT: 14.6 % (ref 11.5–17)
GFR SERPLBLD CREATININE-BSD FMLA CKD-EPI: 54 ML/MIN/1.73/M2
GLOBULIN SER-MCNC: 2.5 GM/DL (ref 2.4–3.5)
GLUCOSE SERPL-MCNC: 123 MG/DL (ref 82–115)
GLUCOSE UR QL STRIP: NORMAL
HCT VFR BLD AUTO: 20.2 % (ref 37–47)
HCT VFR BLD AUTO: 23.2 % (ref 37–47)
HGB BLD-MCNC: 6.8 G/DL (ref 12–16)
HGB BLD-MCNC: 8.1 G/DL (ref 12–16)
HGB UR QL STRIP: NEGATIVE
IMM GRANULOCYTES # BLD AUTO: 0.03 X10(3)/MCL (ref 0–0.04)
IMM GRANULOCYTES # BLD AUTO: 0.03 X10(3)/MCL (ref 0–0.04)
IMM GRANULOCYTES NFR BLD AUTO: 0.4 %
IMM GRANULOCYTES NFR BLD AUTO: 0.5 %
KETONES UR QL STRIP: NEGATIVE
LEUKOCYTE ESTERASE UR QL STRIP: NEGATIVE
LYMPHOCYTES # BLD AUTO: 1.81 X10(3)/MCL (ref 0.6–4.6)
LYMPHOCYTES # BLD AUTO: 1.86 X10(3)/MCL (ref 0.6–4.6)
LYMPHOCYTES NFR BLD AUTO: 24.6 %
LYMPHOCYTES NFR BLD AUTO: 28 %
MCH RBC QN AUTO: 30 PG (ref 27–31)
MCH RBC QN AUTO: 31.1 PG (ref 27–31)
MCHC RBC AUTO-ENTMCNC: 33.7 G/DL (ref 33–36)
MCHC RBC AUTO-ENTMCNC: 34.9 G/DL (ref 33–36)
MCV RBC AUTO: 85.9 FL (ref 80–94)
MCV RBC AUTO: 92.2 FL (ref 80–94)
MONOCYTES # BLD AUTO: 0.91 X10(3)/MCL (ref 0.1–1.3)
MONOCYTES # BLD AUTO: 0.93 X10(3)/MCL (ref 0.1–1.3)
MONOCYTES NFR BLD AUTO: 12.3 %
MONOCYTES NFR BLD AUTO: 14 %
MUCOUS THREADS URNS QL MICRO: ABNORMAL /LPF
NEUTROPHILS # BLD AUTO: 3.79 X10(3)/MCL (ref 2.1–9.2)
NEUTROPHILS # BLD AUTO: 4.59 X10(3)/MCL (ref 2.1–9.2)
NEUTROPHILS NFR BLD AUTO: 56.8 %
NEUTROPHILS NFR BLD AUTO: 62.3 %
NITRITE UR QL STRIP: NEGATIVE
NRBC BLD AUTO-RTO: 0 %
NRBC BLD AUTO-RTO: 0 %
PH UR STRIP: 5.5 [PH]
PLATELET # BLD AUTO: 130 X10(3)/MCL (ref 130–400)
PLATELET # BLD AUTO: 166 X10(3)/MCL (ref 130–400)
PMV BLD AUTO: 9 FL (ref 7.4–10.4)
PMV BLD AUTO: 9 FL (ref 7.4–10.4)
POTASSIUM SERPL-SCNC: 5.2 MMOL/L (ref 3.5–5.1)
PROT SERPL-MCNC: 5.4 GM/DL (ref 5.8–7.6)
PROT UR QL STRIP: NEGATIVE
RBC # BLD AUTO: 2.19 X10(6)/MCL (ref 4.2–5.4)
RBC # BLD AUTO: 2.7 X10(6)/MCL (ref 4.2–5.4)
RBC #/AREA URNS AUTO: ABNORMAL /HPF
SODIUM SERPL-SCNC: 130 MMOL/L (ref 136–145)
SP GR UR STRIP.AUTO: 1.01 (ref 1–1.03)
SQUAMOUS #/AREA URNS LPF: ABNORMAL /HPF
UNIT NUMBER: NORMAL
UNIT NUMBER: NORMAL
UROBILINOGEN UR STRIP-ACNC: NORMAL
WBC # BLD AUTO: 6.65 X10(3)/MCL (ref 4.5–11.5)
WBC # BLD AUTO: 7.37 X10(3)/MCL (ref 4.5–11.5)
WBC #/AREA URNS AUTO: ABNORMAL /HPF

## 2024-10-23 PROCEDURE — 86923 COMPATIBILITY TEST ELECTRIC: CPT | Performed by: NURSE PRACTITIONER

## 2024-10-23 PROCEDURE — 81001 URINALYSIS AUTO W/SCOPE: CPT | Performed by: NURSE PRACTITIONER

## 2024-10-23 PROCEDURE — 97162 PT EVAL MOD COMPLEX 30 MIN: CPT

## 2024-10-23 PROCEDURE — 36415 COLL VENOUS BLD VENIPUNCTURE: CPT

## 2024-10-23 PROCEDURE — P9016 RBC LEUKOCYTES REDUCED: HCPCS | Performed by: NURSE PRACTITIONER

## 2024-10-23 PROCEDURE — 27000221 HC OXYGEN, UP TO 24 HOURS

## 2024-10-23 PROCEDURE — 25000003 PHARM REV CODE 250: Performed by: NURSE PRACTITIONER

## 2024-10-23 PROCEDURE — 85025 COMPLETE CBC W/AUTO DIFF WBC: CPT

## 2024-10-23 PROCEDURE — 30233N1 TRANSFUSION OF NONAUTOLOGOUS RED BLOOD CELLS INTO PERIPHERAL VEIN, PERCUTANEOUS APPROACH: ICD-10-PCS | Performed by: SURGERY

## 2024-10-23 PROCEDURE — 51701 INSERT BLADDER CATHETER: CPT

## 2024-10-23 PROCEDURE — 51798 US URINE CAPACITY MEASURE: CPT

## 2024-10-23 PROCEDURE — 21400001 HC TELEMETRY ROOM

## 2024-10-23 PROCEDURE — 97166 OT EVAL MOD COMPLEX 45 MIN: CPT

## 2024-10-23 PROCEDURE — 25000003 PHARM REV CODE 250

## 2024-10-23 PROCEDURE — 36430 TRANSFUSION BLD/BLD COMPNT: CPT

## 2024-10-23 PROCEDURE — 63600175 PHARM REV CODE 636 W HCPCS: Performed by: PHYSICIAN ASSISTANT

## 2024-10-23 PROCEDURE — 63600175 PHARM REV CODE 636 W HCPCS

## 2024-10-23 PROCEDURE — 80053 COMPREHEN METABOLIC PANEL: CPT

## 2024-10-23 PROCEDURE — 11000001 HC ACUTE MED/SURG PRIVATE ROOM

## 2024-10-23 RX ORDER — BUTALBITAL, ACETAMINOPHEN AND CAFFEINE 50; 325; 40 MG/1; MG/1; MG/1
1 TABLET ORAL EVERY 6 HOURS PRN
Status: ON HOLD | COMMUNITY
End: 2024-10-28

## 2024-10-23 RX ORDER — TAMSULOSIN HYDROCHLORIDE 0.4 MG/1
0.4 CAPSULE ORAL DAILY
Status: DISCONTINUED | OUTPATIENT
Start: 2024-10-23 | End: 2024-10-24

## 2024-10-23 RX ORDER — HYDROCODONE BITARTRATE AND ACETAMINOPHEN 500; 5 MG/1; MG/1
TABLET ORAL
Status: DISCONTINUED | OUTPATIENT
Start: 2024-10-23 | End: 2024-10-28 | Stop reason: HOSPADM

## 2024-10-23 RX ORDER — DOCUSATE SODIUM 100 MG
300 CAPSULE ORAL
Status: DISCONTINUED | OUTPATIENT
Start: 2024-10-23 | End: 2024-10-24

## 2024-10-23 RX ORDER — ALPRAZOLAM 0.5 MG/1
0.5 TABLET, EXTENDED RELEASE ORAL 2 TIMES DAILY PRN
Status: ON HOLD | COMMUNITY
End: 2024-10-28

## 2024-10-23 RX ORDER — SODIUM CHLORIDE 9 MG/ML
INJECTION, SOLUTION INTRAVENOUS CONTINUOUS
Status: DISCONTINUED | OUTPATIENT
Start: 2024-10-23 | End: 2024-10-24

## 2024-10-23 RX ADMIN — METOPROLOL TARTRATE 12.5 MG: 25 TABLET, FILM COATED ORAL at 08:10

## 2024-10-23 RX ADMIN — ACETAMINOPHEN 650 MG: 325 TABLET, FILM COATED ORAL at 08:10

## 2024-10-23 RX ADMIN — OXYCODONE HYDROCHLORIDE 10 MG: 10 TABLET ORAL at 06:10

## 2024-10-23 RX ADMIN — CEFAZOLIN 2 G: 2 INJECTION, POWDER, FOR SOLUTION INTRAMUSCULAR; INTRAVENOUS at 04:10

## 2024-10-23 RX ADMIN — METHOCARBAMOL 500 MG: 500 TABLET ORAL at 08:10

## 2024-10-23 RX ADMIN — SODIUM CHLORIDE: 9 INJECTION, SOLUTION INTRAVENOUS at 05:10

## 2024-10-23 RX ADMIN — GABAPENTIN 300 MG: 300 CAPSULE ORAL at 09:10

## 2024-10-23 RX ADMIN — ENOXAPARIN SODIUM 40 MG: 40 INJECTION SUBCUTANEOUS at 09:10

## 2024-10-23 RX ADMIN — Medication 300 ML: at 11:10

## 2024-10-23 RX ADMIN — DOCUSATE SODIUM 100 MG: 100 CAPSULE, LIQUID FILLED ORAL at 08:10

## 2024-10-23 RX ADMIN — GABAPENTIN 300 MG: 300 CAPSULE ORAL at 02:10

## 2024-10-23 RX ADMIN — Medication 300 ML: at 02:10

## 2024-10-23 RX ADMIN — SODIUM CHLORIDE: 9 INJECTION, SOLUTION INTRAVENOUS at 12:10

## 2024-10-23 RX ADMIN — SODIUM CHLORIDE: 9 INJECTION, SOLUTION INTRAVENOUS at 09:10

## 2024-10-23 RX ADMIN — METHOCARBAMOL 500 MG: 500 TABLET ORAL at 02:10

## 2024-10-23 RX ADMIN — ACETAMINOPHEN 650 MG: 325 TABLET, FILM COATED ORAL at 06:10

## 2024-10-23 RX ADMIN — GABAPENTIN 300 MG: 300 CAPSULE ORAL at 08:10

## 2024-10-23 RX ADMIN — METOPROLOL TARTRATE 12.5 MG: 25 TABLET, FILM COATED ORAL at 09:10

## 2024-10-23 RX ADMIN — Medication 300 ML: at 07:10

## 2024-10-23 RX ADMIN — POLYETHYLENE GLYCOL 3350 17 G: 17 POWDER, FOR SOLUTION ORAL at 08:10

## 2024-10-23 RX ADMIN — OXYCODONE HYDROCHLORIDE 10 MG: 10 TABLET ORAL at 08:10

## 2024-10-23 RX ADMIN — TAMSULOSIN HYDROCHLORIDE 0.4 MG: 0.4 CAPSULE ORAL at 11:10

## 2024-10-23 RX ADMIN — Medication 300 ML: at 06:10

## 2024-10-23 RX ADMIN — METHOCARBAMOL 500 MG: 500 TABLET ORAL at 06:10

## 2024-10-23 RX ADMIN — ENOXAPARIN SODIUM 40 MG: 40 INJECTION SUBCUTANEOUS at 08:10

## 2024-10-23 RX ADMIN — ACETAMINOPHEN 650 MG: 325 TABLET, FILM COATED ORAL at 03:10

## 2024-10-23 NOTE — ANESTHESIA POSTPROCEDURE EVALUATION
Anesthesia Post Evaluation    Patient: Jerilyn Fernandez    Procedure(s) Performed: Procedure(s) (LRB):  INSERTION, INTRAMEDULLARY BHARGAVI, FEMUR, DISTAL, RETROGRADE (Right)    Final Anesthesia Type: general      Patient location during evaluation: PACU  Patient participation: Yes- Able to Participate  Level of consciousness: awake and alert and oriented  Post-procedure vital signs: reviewed and stable  Pain management: adequate  Airway patency: patent    PONV status at discharge: No PONV  Anesthetic complications: no      Cardiovascular status: blood pressure returned to baseline  Respiratory status: unassisted, spontaneous ventilation and room air  Hydration status: euvolemic  Follow-up not needed.              Vitals Value Taken Time   /58 10/23/24 0612   Temp 37.7 °C (99.9 °F) 10/23/24 0612   Pulse 84 10/23/24 0612   Resp 16 10/23/24 0619   SpO2 98 % 10/23/24 0612         Event Time   Out of Recovery 10/22/2024 14:00:00         Pain/Lisa Score: Pain Rating Prior to Med Admin: 10 (10/23/2024  6:19 AM)  Pain Rating Post Med Admin: 0 (10/22/2024 10:27 PM)  Lisa Score: 9 (10/22/2024  2:00 PM)

## 2024-10-24 PROBLEM — D62 ACUTE BLOOD LOSS ANEMIA: Status: ACTIVE | Noted: 2024-10-24

## 2024-10-24 LAB
ABO + RH BLD: NORMAL
ABO + RH BLD: NORMAL
ALBUMIN SERPL-MCNC: 2.3 G/DL (ref 3.4–4.8)
ALBUMIN/GLOB SERPL: 0.9 RATIO (ref 1.1–2)
ALP SERPL-CCNC: 68 UNIT/L (ref 40–150)
ALT SERPL-CCNC: 6 UNIT/L (ref 0–55)
ANION GAP SERPL CALC-SCNC: 4 MEQ/L
AST SERPL-CCNC: 24 UNIT/L (ref 5–34)
BASOPHILS # BLD AUTO: 0.02 X10(3)/MCL
BASOPHILS NFR BLD AUTO: 0.3 %
BILIRUB SERPL-MCNC: 0.6 MG/DL
BLD PROD TYP BPU: NORMAL
BLD PROD TYP BPU: NORMAL
BLOOD UNIT EXPIRATION DATE: NORMAL
BLOOD UNIT EXPIRATION DATE: NORMAL
BLOOD UNIT TYPE CODE: 6200
BLOOD UNIT TYPE CODE: 6200
BUN SERPL-MCNC: 14.8 MG/DL (ref 9.8–20.1)
CALCIUM SERPL-MCNC: 7.3 MG/DL (ref 8.4–10.2)
CHLORIDE SERPL-SCNC: 101 MMOL/L (ref 98–107)
CO2 SERPL-SCNC: 20 MMOL/L (ref 23–31)
CREAT SERPL-MCNC: 0.9 MG/DL (ref 0.55–1.02)
CREAT/UREA NIT SERPL: 16
CROSSMATCH INTERPRETATION: NORMAL
CROSSMATCH INTERPRETATION: NORMAL
CRP SERPL-MCNC: 224.5 MG/L
DISPENSE STATUS: NORMAL
DISPENSE STATUS: NORMAL
EOSINOPHIL # BLD AUTO: 0.06 X10(3)/MCL (ref 0–0.9)
EOSINOPHIL NFR BLD AUTO: 0.8 %
ERYTHROCYTE [DISTWIDTH] IN BLOOD BY AUTOMATED COUNT: 14.7 % (ref 11.5–17)
GFR SERPLBLD CREATININE-BSD FMLA CKD-EPI: >60 ML/MIN/1.73/M2
GLOBULIN SER-MCNC: 2.5 GM/DL (ref 2.4–3.5)
GLUCOSE SERPL-MCNC: 119 MG/DL (ref 82–115)
HCT VFR BLD AUTO: 18.6 % (ref 37–47)
HGB BLD-MCNC: 6.4 G/DL (ref 12–16)
IMM GRANULOCYTES # BLD AUTO: 0.04 X10(3)/MCL (ref 0–0.04)
IMM GRANULOCYTES NFR BLD AUTO: 0.6 %
LYMPHOCYTES # BLD AUTO: 1.69 X10(3)/MCL (ref 0.6–4.6)
LYMPHOCYTES NFR BLD AUTO: 23.8 %
MAGNESIUM SERPL-MCNC: 2 MG/DL (ref 1.6–2.6)
MCH RBC QN AUTO: 30.3 PG (ref 27–31)
MCHC RBC AUTO-ENTMCNC: 34.4 G/DL (ref 33–36)
MCV RBC AUTO: 88.2 FL (ref 80–94)
MONOCYTES # BLD AUTO: 0.85 X10(3)/MCL (ref 0.1–1.3)
MONOCYTES NFR BLD AUTO: 12 %
NEUTROPHILS # BLD AUTO: 4.44 X10(3)/MCL (ref 2.1–9.2)
NEUTROPHILS NFR BLD AUTO: 62.5 %
NRBC BLD AUTO-RTO: 0 %
PHOSPHATE SERPL-MCNC: 2.2 MG/DL (ref 2.3–4.7)
PLATELET # BLD AUTO: 116 X10(3)/MCL (ref 130–400)
PMV BLD AUTO: 9.3 FL (ref 7.4–10.4)
POTASSIUM SERPL-SCNC: 4.5 MMOL/L (ref 3.5–5.1)
PREALB SERPL-MCNC: 12.4 MG/DL (ref 14–37)
PROT SERPL-MCNC: 4.8 GM/DL (ref 5.8–7.6)
RBC # BLD AUTO: 2.11 X10(6)/MCL (ref 4.2–5.4)
SODIUM SERPL-SCNC: 125 MMOL/L (ref 136–145)
UNIT NUMBER: NORMAL
UNIT NUMBER: NORMAL
WBC # BLD AUTO: 7.1 X10(3)/MCL (ref 4.5–11.5)

## 2024-10-24 PROCEDURE — 25000003 PHARM REV CODE 250

## 2024-10-24 PROCEDURE — 36430 TRANSFUSION BLD/BLD COMPNT: CPT

## 2024-10-24 PROCEDURE — 25000003 PHARM REV CODE 250: Performed by: NURSE PRACTITIONER

## 2024-10-24 PROCEDURE — 99900035 HC TECH TIME PER 15 MIN (STAT)

## 2024-10-24 PROCEDURE — 86923 COMPATIBILITY TEST ELECTRIC: CPT | Mod: 91 | Performed by: STUDENT IN AN ORGANIZED HEALTH CARE EDUCATION/TRAINING PROGRAM

## 2024-10-24 PROCEDURE — 11000001 HC ACUTE MED/SURG PRIVATE ROOM

## 2024-10-24 PROCEDURE — 85025 COMPLETE CBC W/AUTO DIFF WBC: CPT

## 2024-10-24 PROCEDURE — P9016 RBC LEUKOCYTES REDUCED: HCPCS | Performed by: STUDENT IN AN ORGANIZED HEALTH CARE EDUCATION/TRAINING PROGRAM

## 2024-10-24 PROCEDURE — 25000003 PHARM REV CODE 250: Performed by: STUDENT IN AN ORGANIZED HEALTH CARE EDUCATION/TRAINING PROGRAM

## 2024-10-24 PROCEDURE — 80053 COMPREHEN METABOLIC PANEL: CPT

## 2024-10-24 PROCEDURE — 36415 COLL VENOUS BLD VENIPUNCTURE: CPT

## 2024-10-24 PROCEDURE — 84100 ASSAY OF PHOSPHORUS: CPT | Performed by: NURSE PRACTITIONER

## 2024-10-24 PROCEDURE — 97110 THERAPEUTIC EXERCISES: CPT

## 2024-10-24 PROCEDURE — 94799 UNLISTED PULMONARY SVC/PX: CPT

## 2024-10-24 PROCEDURE — 83735 ASSAY OF MAGNESIUM: CPT | Performed by: NURSE PRACTITIONER

## 2024-10-24 PROCEDURE — 63600175 PHARM REV CODE 636 W HCPCS

## 2024-10-24 PROCEDURE — 84134 ASSAY OF PREALBUMIN: CPT

## 2024-10-24 PROCEDURE — 21400001 HC TELEMETRY ROOM

## 2024-10-24 PROCEDURE — 86140 C-REACTIVE PROTEIN: CPT

## 2024-10-24 PROCEDURE — 97530 THERAPEUTIC ACTIVITIES: CPT

## 2024-10-24 RX ORDER — VALSARTAN 160 MG/1
160 TABLET ORAL DAILY
Status: ON HOLD | COMMUNITY
End: 2024-10-28

## 2024-10-24 RX ORDER — MUPIROCIN 20 MG/G
OINTMENT TOPICAL 2 TIMES DAILY
Status: DISCONTINUED | OUTPATIENT
Start: 2024-10-24 | End: 2024-10-28 | Stop reason: HOSPADM

## 2024-10-24 RX ORDER — SODIUM CHLORIDE 1 G/1
2000 TABLET ORAL 3 TIMES DAILY
Status: DISCONTINUED | OUTPATIENT
Start: 2024-10-24 | End: 2024-10-28 | Stop reason: HOSPADM

## 2024-10-24 RX ORDER — BUTALBITAL, ACETAMINOPHEN AND CAFFEINE 50; 325; 40 MG/1; MG/1; MG/1
1 TABLET ORAL EVERY 6 HOURS PRN
Status: DISCONTINUED | OUTPATIENT
Start: 2024-10-25 | End: 2024-10-28 | Stop reason: HOSPADM

## 2024-10-24 RX ORDER — ATORVASTATIN CALCIUM 20 MG/1
20 TABLET, FILM COATED ORAL DAILY
Status: ON HOLD | COMMUNITY
End: 2024-10-28

## 2024-10-24 RX ORDER — TAMSULOSIN HYDROCHLORIDE 0.4 MG/1
0.4 CAPSULE ORAL DAILY
Status: DISCONTINUED | OUTPATIENT
Start: 2024-10-24 | End: 2024-10-25

## 2024-10-24 RX ORDER — DOCUSATE SODIUM 100 MG
400 CAPSULE ORAL
Status: DISCONTINUED | OUTPATIENT
Start: 2024-10-24 | End: 2024-10-28 | Stop reason: HOSPADM

## 2024-10-24 RX ORDER — ALPRAZOLAM 0.5 MG/1
0.5 TABLET ORAL 2 TIMES DAILY PRN
Status: DISCONTINUED | OUTPATIENT
Start: 2024-10-25 | End: 2024-10-28 | Stop reason: HOSPADM

## 2024-10-24 RX ORDER — HYDROCODONE BITARTRATE AND ACETAMINOPHEN 500; 5 MG/1; MG/1
TABLET ORAL
Status: DISCONTINUED | OUTPATIENT
Start: 2024-10-24 | End: 2024-10-28 | Stop reason: HOSPADM

## 2024-10-24 RX ADMIN — Medication 300 ML: at 03:10

## 2024-10-24 RX ADMIN — ACETAMINOPHEN 650 MG: 325 TABLET, FILM COATED ORAL at 06:10

## 2024-10-24 RX ADMIN — ENOXAPARIN SODIUM 40 MG: 40 INJECTION SUBCUTANEOUS at 09:10

## 2024-10-24 RX ADMIN — ACETAMINOPHEN 650 MG: 325 TABLET, FILM COATED ORAL at 02:10

## 2024-10-24 RX ADMIN — GABAPENTIN 300 MG: 300 CAPSULE ORAL at 02:10

## 2024-10-24 RX ADMIN — Medication 400 ML: at 09:10

## 2024-10-24 RX ADMIN — Medication 400 ML: at 06:10

## 2024-10-24 RX ADMIN — METOPROLOL TARTRATE 12.5 MG: 25 TABLET, FILM COATED ORAL at 09:10

## 2024-10-24 RX ADMIN — SODIUM PHOSPHATE, MONOBASIC, MONOHYDRATE AND SODIUM PHOSPHATE, DIBASIC, ANHYDROUS 20.01 MMOL: 142; 276 INJECTION, SOLUTION INTRAVENOUS at 09:10

## 2024-10-24 RX ADMIN — TAMSULOSIN HYDROCHLORIDE 0.4 MG: 0.4 CAPSULE ORAL at 04:10

## 2024-10-24 RX ADMIN — ACETAMINOPHEN 650 MG: 325 TABLET, FILM COATED ORAL at 09:10

## 2024-10-24 RX ADMIN — SODIUM CHLORIDE 2000 MG: 1 TABLET ORAL at 02:10

## 2024-10-24 RX ADMIN — SODIUM CHLORIDE 2000 MG: 1 TABLET ORAL at 09:10

## 2024-10-24 RX ADMIN — Medication 400 ML: at 02:10

## 2024-10-24 RX ADMIN — GABAPENTIN 300 MG: 300 CAPSULE ORAL at 09:10

## 2024-10-24 RX ADMIN — MUPIROCIN: 20 OINTMENT TOPICAL at 09:10

## 2024-10-24 RX ADMIN — METHOCARBAMOL 500 MG: 500 TABLET ORAL at 02:10

## 2024-10-24 RX ADMIN — DOCUSATE SODIUM 100 MG: 100 CAPSULE, LIQUID FILLED ORAL at 09:10

## 2024-10-24 RX ADMIN — METHOCARBAMOL 500 MG: 500 TABLET ORAL at 09:10

## 2024-10-24 RX ADMIN — METHOCARBAMOL 500 MG: 500 TABLET ORAL at 05:10

## 2024-10-24 RX ADMIN — POLYETHYLENE GLYCOL 3350 17 G: 17 POWDER, FOR SOLUTION ORAL at 09:10

## 2024-10-24 RX ADMIN — Medication 400 ML: at 10:10

## 2024-10-24 RX ADMIN — SODIUM CHLORIDE 250 ML: 9 INJECTION, SOLUTION INTRAVENOUS at 04:10

## 2024-10-25 LAB
ALBUMIN SERPL-MCNC: 2.2 G/DL (ref 3.4–4.8)
ALBUMIN/GLOB SERPL: 0.9 RATIO (ref 1.1–2)
ALP SERPL-CCNC: 115 UNIT/L (ref 40–150)
ALT SERPL-CCNC: 11 UNIT/L (ref 0–55)
ANION GAP SERPL CALC-SCNC: 5 MEQ/L
AST SERPL-CCNC: 32 UNIT/L (ref 5–34)
BASOPHILS # BLD AUTO: 0.03 X10(3)/MCL
BASOPHILS NFR BLD AUTO: 0.5 %
BILIRUB SERPL-MCNC: 0.7 MG/DL
BUN SERPL-MCNC: 12.5 MG/DL (ref 9.8–20.1)
CALCIUM SERPL-MCNC: 7.6 MG/DL (ref 8.4–10.2)
CHLORIDE SERPL-SCNC: 104 MMOL/L (ref 98–107)
CO2 SERPL-SCNC: 23 MMOL/L (ref 23–31)
CREAT SERPL-MCNC: 0.73 MG/DL (ref 0.55–1.02)
CREAT/UREA NIT SERPL: 17
EOSINOPHIL # BLD AUTO: 0.15 X10(3)/MCL (ref 0–0.9)
EOSINOPHIL NFR BLD AUTO: 2.3 %
ERYTHROCYTE [DISTWIDTH] IN BLOOD BY AUTOMATED COUNT: 14.1 % (ref 11.5–17)
GFR SERPLBLD CREATININE-BSD FMLA CKD-EPI: >60 ML/MIN/1.73/M2
GLOBULIN SER-MCNC: 2.5 GM/DL (ref 2.4–3.5)
GLUCOSE SERPL-MCNC: 108 MG/DL (ref 82–115)
HCT VFR BLD AUTO: 23.1 % (ref 37–47)
HGB BLD-MCNC: 8.3 G/DL (ref 12–16)
IMM GRANULOCYTES # BLD AUTO: 0.02 X10(3)/MCL (ref 0–0.04)
IMM GRANULOCYTES NFR BLD AUTO: 0.3 %
LYMPHOCYTES # BLD AUTO: 1.21 X10(3)/MCL (ref 0.6–4.6)
LYMPHOCYTES NFR BLD AUTO: 18.9 %
MCH RBC QN AUTO: 31 PG (ref 27–31)
MCHC RBC AUTO-ENTMCNC: 35.9 G/DL (ref 33–36)
MCV RBC AUTO: 86.2 FL (ref 80–94)
MONOCYTES # BLD AUTO: 0.64 X10(3)/MCL (ref 0.1–1.3)
MONOCYTES NFR BLD AUTO: 10 %
NEUTROPHILS # BLD AUTO: 4.36 X10(3)/MCL (ref 2.1–9.2)
NEUTROPHILS NFR BLD AUTO: 68 %
NRBC BLD AUTO-RTO: 0 %
PLATELET # BLD AUTO: 110 X10(3)/MCL (ref 130–400)
PMV BLD AUTO: 9.3 FL (ref 7.4–10.4)
POTASSIUM SERPL-SCNC: 4.6 MMOL/L (ref 3.5–5.1)
PROT SERPL-MCNC: 4.7 GM/DL (ref 5.8–7.6)
RBC # BLD AUTO: 2.68 X10(6)/MCL (ref 4.2–5.4)
SODIUM SERPL-SCNC: 132 MMOL/L (ref 136–145)
WBC # BLD AUTO: 6.41 X10(3)/MCL (ref 4.5–11.5)

## 2024-10-25 PROCEDURE — 25000003 PHARM REV CODE 250: Performed by: NURSE PRACTITIONER

## 2024-10-25 PROCEDURE — 36415 COLL VENOUS BLD VENIPUNCTURE: CPT

## 2024-10-25 PROCEDURE — 63600175 PHARM REV CODE 636 W HCPCS

## 2024-10-25 PROCEDURE — 25000003 PHARM REV CODE 250

## 2024-10-25 PROCEDURE — 85025 COMPLETE CBC W/AUTO DIFF WBC: CPT

## 2024-10-25 PROCEDURE — 80053 COMPREHEN METABOLIC PANEL: CPT

## 2024-10-25 PROCEDURE — 97110 THERAPEUTIC EXERCISES: CPT

## 2024-10-25 PROCEDURE — 21400001 HC TELEMETRY ROOM

## 2024-10-25 PROCEDURE — 99900031 HC PATIENT EDUCATION (STAT)

## 2024-10-25 PROCEDURE — 25000003 PHARM REV CODE 250: Performed by: STUDENT IN AN ORGANIZED HEALTH CARE EDUCATION/TRAINING PROGRAM

## 2024-10-25 PROCEDURE — 63600175 PHARM REV CODE 636 W HCPCS: Performed by: PHYSICIAN ASSISTANT

## 2024-10-25 PROCEDURE — 97530 THERAPEUTIC ACTIVITIES: CPT

## 2024-10-25 PROCEDURE — 94799 UNLISTED PULMONARY SVC/PX: CPT

## 2024-10-25 RX ADMIN — OXYCODONE HYDROCHLORIDE 10 MG: 10 TABLET ORAL at 10:10

## 2024-10-25 RX ADMIN — SODIUM CHLORIDE 2000 MG: 1 TABLET ORAL at 02:10

## 2024-10-25 RX ADMIN — Medication 400 ML: at 06:10

## 2024-10-25 RX ADMIN — METHOCARBAMOL 500 MG: 500 TABLET ORAL at 11:10

## 2024-10-25 RX ADMIN — MUPIROCIN: 20 OINTMENT TOPICAL at 10:10

## 2024-10-25 RX ADMIN — ACETAMINOPHEN 650 MG: 325 TABLET, FILM COATED ORAL at 05:10

## 2024-10-25 RX ADMIN — SODIUM CHLORIDE 2000 MG: 1 TABLET ORAL at 10:10

## 2024-10-25 RX ADMIN — GABAPENTIN 300 MG: 300 CAPSULE ORAL at 08:10

## 2024-10-25 RX ADMIN — Medication 400 ML: at 02:10

## 2024-10-25 RX ADMIN — ACETAMINOPHEN 650 MG: 325 TABLET, FILM COATED ORAL at 11:10

## 2024-10-25 RX ADMIN — ENOXAPARIN SODIUM 40 MG: 40 INJECTION SUBCUTANEOUS at 10:10

## 2024-10-25 RX ADMIN — ENOXAPARIN SODIUM 40 MG: 40 INJECTION SUBCUTANEOUS at 08:10

## 2024-10-25 RX ADMIN — METHOCARBAMOL 500 MG: 500 TABLET ORAL at 05:10

## 2024-10-25 RX ADMIN — ALPRAZOLAM 0.5 MG: 0.5 TABLET ORAL at 01:10

## 2024-10-25 RX ADMIN — MUPIROCIN: 20 OINTMENT TOPICAL at 08:10

## 2024-10-25 RX ADMIN — ACETAMINOPHEN 650 MG: 325 TABLET, FILM COATED ORAL at 06:10

## 2024-10-25 RX ADMIN — Medication 400 ML: at 11:10

## 2024-10-25 RX ADMIN — GABAPENTIN 300 MG: 300 CAPSULE ORAL at 02:10

## 2024-10-25 RX ADMIN — MORPHINE SULFATE 4 MG: 4 INJECTION INTRAVENOUS at 06:10

## 2024-10-25 RX ADMIN — METHOCARBAMOL 500 MG: 500 TABLET ORAL at 02:10

## 2024-10-25 RX ADMIN — DOCUSATE SODIUM 100 MG: 100 CAPSULE, LIQUID FILLED ORAL at 10:10

## 2024-10-25 RX ADMIN — SODIUM CHLORIDE 2000 MG: 1 TABLET ORAL at 08:10

## 2024-10-25 RX ADMIN — GABAPENTIN 300 MG: 300 CAPSULE ORAL at 10:10

## 2024-10-25 RX ADMIN — ACETAMINOPHEN 650 MG: 325 TABLET, FILM COATED ORAL at 02:10

## 2024-10-25 RX ADMIN — Medication 400 ML: at 10:10

## 2024-10-26 LAB
ABO + RH BLD: NORMAL
ALBUMIN SERPL-MCNC: 2.2 G/DL (ref 3.4–4.8)
ALBUMIN/GLOB SERPL: 0.9 RATIO (ref 1.1–2)
ALP SERPL-CCNC: 148 UNIT/L (ref 40–150)
ALT SERPL-CCNC: 15 UNIT/L (ref 0–55)
ANION GAP SERPL CALC-SCNC: 6 MEQ/L
AST SERPL-CCNC: 28 UNIT/L (ref 5–34)
BASOPHILS # BLD AUTO: 0.02 X10(3)/MCL
BASOPHILS NFR BLD AUTO: 0.4 %
BILIRUB SERPL-MCNC: 0.6 MG/DL
BLD PROD TYP BPU: NORMAL
BLOOD UNIT EXPIRATION DATE: NORMAL
BLOOD UNIT TYPE CODE: 6200
BUN SERPL-MCNC: 11.6 MG/DL (ref 9.8–20.1)
CALCIUM SERPL-MCNC: 7.7 MG/DL (ref 8.4–10.2)
CHLORIDE SERPL-SCNC: 107 MMOL/L (ref 98–107)
CO2 SERPL-SCNC: 23 MMOL/L (ref 23–31)
CREAT SERPL-MCNC: 0.68 MG/DL (ref 0.55–1.02)
CREAT/UREA NIT SERPL: 17
CROSSMATCH INTERPRETATION: NORMAL
DISPENSE STATUS: NORMAL
EOSINOPHIL # BLD AUTO: 0.15 X10(3)/MCL (ref 0–0.9)
EOSINOPHIL NFR BLD AUTO: 3.3 %
ERYTHROCYTE [DISTWIDTH] IN BLOOD BY AUTOMATED COUNT: 14.1 % (ref 11.5–17)
GFR SERPLBLD CREATININE-BSD FMLA CKD-EPI: >60 ML/MIN/1.73/M2
GLOBULIN SER-MCNC: 2.4 GM/DL (ref 2.4–3.5)
GLUCOSE SERPL-MCNC: 101 MG/DL (ref 82–115)
GROUP & RH: NORMAL
HCT VFR BLD AUTO: 21.9 % (ref 37–47)
HGB BLD-MCNC: 7.5 G/DL (ref 12–16)
IMM GRANULOCYTES # BLD AUTO: 0.02 X10(3)/MCL (ref 0–0.04)
IMM GRANULOCYTES NFR BLD AUTO: 0.4 %
INDIRECT COOMBS: NORMAL
LYMPHOCYTES # BLD AUTO: 1.11 X10(3)/MCL (ref 0.6–4.6)
LYMPHOCYTES NFR BLD AUTO: 24.3 %
MCH RBC QN AUTO: 30 PG (ref 27–31)
MCHC RBC AUTO-ENTMCNC: 34.2 G/DL (ref 33–36)
MCV RBC AUTO: 87.6 FL (ref 80–94)
MONOCYTES # BLD AUTO: 0.4 X10(3)/MCL (ref 0.1–1.3)
MONOCYTES NFR BLD AUTO: 8.8 %
NEUTROPHILS # BLD AUTO: 2.87 X10(3)/MCL (ref 2.1–9.2)
NEUTROPHILS NFR BLD AUTO: 62.8 %
NRBC BLD AUTO-RTO: 0 %
PLATELET # BLD AUTO: 143 X10(3)/MCL (ref 130–400)
PMV BLD AUTO: 9.3 FL (ref 7.4–10.4)
POTASSIUM SERPL-SCNC: 4.3 MMOL/L (ref 3.5–5.1)
PROT SERPL-MCNC: 4.6 GM/DL (ref 5.8–7.6)
RBC # BLD AUTO: 2.5 X10(6)/MCL (ref 4.2–5.4)
SODIUM SERPL-SCNC: 136 MMOL/L (ref 136–145)
SPECIMEN OUTDATE: NORMAL
UNIT NUMBER: NORMAL
WBC # BLD AUTO: 4.57 X10(3)/MCL (ref 4.5–11.5)

## 2024-10-26 PROCEDURE — 80053 COMPREHEN METABOLIC PANEL: CPT

## 2024-10-26 PROCEDURE — 63600175 PHARM REV CODE 636 W HCPCS

## 2024-10-26 PROCEDURE — 25000003 PHARM REV CODE 250: Performed by: NURSE PRACTITIONER

## 2024-10-26 PROCEDURE — 99900035 HC TECH TIME PER 15 MIN (STAT)

## 2024-10-26 PROCEDURE — 25000003 PHARM REV CODE 250

## 2024-10-26 PROCEDURE — 86923 COMPATIBILITY TEST ELECTRIC: CPT

## 2024-10-26 PROCEDURE — 63600175 PHARM REV CODE 636 W HCPCS: Performed by: PHYSICIAN ASSISTANT

## 2024-10-26 PROCEDURE — 85025 COMPLETE CBC W/AUTO DIFF WBC: CPT

## 2024-10-26 PROCEDURE — 21400001 HC TELEMETRY ROOM

## 2024-10-26 PROCEDURE — 36415 COLL VENOUS BLD VENIPUNCTURE: CPT

## 2024-10-26 PROCEDURE — 36430 TRANSFUSION BLD/BLD COMPNT: CPT

## 2024-10-26 PROCEDURE — P9016 RBC LEUKOCYTES REDUCED: HCPCS

## 2024-10-26 PROCEDURE — 86901 BLOOD TYPING SEROLOGIC RH(D): CPT

## 2024-10-26 RX ORDER — HYDROCODONE BITARTRATE AND ACETAMINOPHEN 500; 5 MG/1; MG/1
TABLET ORAL
Status: DISCONTINUED | OUTPATIENT
Start: 2024-10-26 | End: 2024-10-28 | Stop reason: HOSPADM

## 2024-10-26 RX ADMIN — SODIUM CHLORIDE 2000 MG: 1 TABLET ORAL at 04:10

## 2024-10-26 RX ADMIN — Medication 400 ML: at 03:10

## 2024-10-26 RX ADMIN — DOCUSATE SODIUM 100 MG: 100 CAPSULE, LIQUID FILLED ORAL at 09:10

## 2024-10-26 RX ADMIN — SODIUM CHLORIDE 2000 MG: 1 TABLET ORAL at 08:10

## 2024-10-26 RX ADMIN — OXYCODONE HYDROCHLORIDE 10 MG: 10 TABLET ORAL at 09:10

## 2024-10-26 RX ADMIN — SODIUM CHLORIDE 2000 MG: 1 TABLET ORAL at 09:10

## 2024-10-26 RX ADMIN — METHOCARBAMOL 500 MG: 500 TABLET ORAL at 04:10

## 2024-10-26 RX ADMIN — GABAPENTIN 300 MG: 300 CAPSULE ORAL at 09:10

## 2024-10-26 RX ADMIN — ONDANSETRON 4 MG: 2 INJECTION INTRAMUSCULAR; INTRAVENOUS at 08:10

## 2024-10-26 RX ADMIN — METHOCARBAMOL 500 MG: 500 TABLET ORAL at 09:10

## 2024-10-26 RX ADMIN — Medication 400 ML: at 04:10

## 2024-10-26 RX ADMIN — OXYCODONE HYDROCHLORIDE 10 MG: 10 TABLET ORAL at 04:10

## 2024-10-26 RX ADMIN — ENOXAPARIN SODIUM 40 MG: 40 INJECTION SUBCUTANEOUS at 09:10

## 2024-10-26 RX ADMIN — OXYCODONE HYDROCHLORIDE 10 MG: 10 TABLET ORAL at 11:10

## 2024-10-26 RX ADMIN — ACETAMINOPHEN 650 MG: 325 TABLET, FILM COATED ORAL at 03:10

## 2024-10-26 RX ADMIN — ACETAMINOPHEN 650 MG: 325 TABLET, FILM COATED ORAL at 11:10

## 2024-10-26 RX ADMIN — GABAPENTIN 300 MG: 300 CAPSULE ORAL at 04:10

## 2024-10-26 RX ADMIN — GABAPENTIN 300 MG: 300 CAPSULE ORAL at 08:10

## 2024-10-26 RX ADMIN — Medication 400 ML: at 09:10

## 2024-10-26 RX ADMIN — MUPIROCIN: 20 OINTMENT TOPICAL at 08:10

## 2024-10-26 RX ADMIN — Medication 6 MG: at 09:10

## 2024-10-26 RX ADMIN — Medication 400 ML: at 11:10

## 2024-10-26 RX ADMIN — ENOXAPARIN SODIUM 40 MG: 40 INJECTION SUBCUTANEOUS at 08:10

## 2024-10-26 RX ADMIN — MUPIROCIN: 20 OINTMENT TOPICAL at 09:10

## 2024-10-26 RX ADMIN — Medication 400 ML: at 08:10

## 2024-10-26 RX ADMIN — DOCUSATE SODIUM 100 MG: 100 CAPSULE, LIQUID FILLED ORAL at 08:10

## 2024-10-27 LAB
ALBUMIN SERPL-MCNC: 2.2 G/DL (ref 3.4–4.8)
ALBUMIN/GLOB SERPL: 0.8 RATIO (ref 1.1–2)
ALP SERPL-CCNC: 250 UNIT/L (ref 40–150)
ALT SERPL-CCNC: 26 UNIT/L (ref 0–55)
ANION GAP SERPL CALC-SCNC: 10 MEQ/L
AST SERPL-CCNC: 38 UNIT/L (ref 5–34)
BASOPHILS # BLD AUTO: 0.03 X10(3)/MCL
BASOPHILS NFR BLD AUTO: 0.5 %
BILIRUB SERPL-MCNC: 1.1 MG/DL
BUN SERPL-MCNC: 10.7 MG/DL (ref 9.8–20.1)
CALCIUM SERPL-MCNC: 8.1 MG/DL (ref 8.4–10.2)
CHLORIDE SERPL-SCNC: 104 MMOL/L (ref 98–107)
CO2 SERPL-SCNC: 21 MMOL/L (ref 23–31)
CREAT SERPL-MCNC: 0.72 MG/DL (ref 0.55–1.02)
CREAT/UREA NIT SERPL: 15
EOSINOPHIL # BLD AUTO: 0.09 X10(3)/MCL (ref 0–0.9)
EOSINOPHIL NFR BLD AUTO: 1.5 %
ERYTHROCYTE [DISTWIDTH] IN BLOOD BY AUTOMATED COUNT: 14.6 % (ref 11.5–17)
GFR SERPLBLD CREATININE-BSD FMLA CKD-EPI: >60 ML/MIN/1.73/M2
GLOBULIN SER-MCNC: 2.6 GM/DL (ref 2.4–3.5)
GLUCOSE SERPL-MCNC: 98 MG/DL (ref 82–115)
HCT VFR BLD AUTO: 27 % (ref 37–47)
HGB BLD-MCNC: 9.2 G/DL (ref 12–16)
IMM GRANULOCYTES # BLD AUTO: 0.02 X10(3)/MCL (ref 0–0.04)
IMM GRANULOCYTES NFR BLD AUTO: 0.3 %
LYMPHOCYTES # BLD AUTO: 1.39 X10(3)/MCL (ref 0.6–4.6)
LYMPHOCYTES NFR BLD AUTO: 22.7 %
MCH RBC QN AUTO: 30.7 PG (ref 27–31)
MCHC RBC AUTO-ENTMCNC: 34.1 G/DL (ref 33–36)
MCV RBC AUTO: 90 FL (ref 80–94)
MONOCYTES # BLD AUTO: 0.62 X10(3)/MCL (ref 0.1–1.3)
MONOCYTES NFR BLD AUTO: 10.1 %
NEUTROPHILS # BLD AUTO: 3.98 X10(3)/MCL (ref 2.1–9.2)
NEUTROPHILS NFR BLD AUTO: 64.9 %
NRBC BLD AUTO-RTO: 0 %
PLATELET # BLD AUTO: 180 X10(3)/MCL (ref 130–400)
PMV BLD AUTO: 9.2 FL (ref 7.4–10.4)
POTASSIUM SERPL-SCNC: 4.5 MMOL/L (ref 3.5–5.1)
PROT SERPL-MCNC: 4.8 GM/DL (ref 5.8–7.6)
RBC # BLD AUTO: 3 X10(6)/MCL (ref 4.2–5.4)
SODIUM SERPL-SCNC: 135 MMOL/L (ref 136–145)
WBC # BLD AUTO: 6.13 X10(3)/MCL (ref 4.5–11.5)

## 2024-10-27 PROCEDURE — 25000003 PHARM REV CODE 250: Performed by: NURSE PRACTITIONER

## 2024-10-27 PROCEDURE — 97116 GAIT TRAINING THERAPY: CPT | Mod: CQ

## 2024-10-27 PROCEDURE — 63600175 PHARM REV CODE 636 W HCPCS: Performed by: PHYSICIAN ASSISTANT

## 2024-10-27 PROCEDURE — 85025 COMPLETE CBC W/AUTO DIFF WBC: CPT

## 2024-10-27 PROCEDURE — 93005 ELECTROCARDIOGRAM TRACING: CPT

## 2024-10-27 PROCEDURE — 25000003 PHARM REV CODE 250

## 2024-10-27 PROCEDURE — 97110 THERAPEUTIC EXERCISES: CPT | Mod: CQ

## 2024-10-27 PROCEDURE — 80053 COMPREHEN METABOLIC PANEL: CPT

## 2024-10-27 PROCEDURE — 93010 ELECTROCARDIOGRAM REPORT: CPT | Mod: ,,, | Performed by: STUDENT IN AN ORGANIZED HEALTH CARE EDUCATION/TRAINING PROGRAM

## 2024-10-27 PROCEDURE — 63600175 PHARM REV CODE 636 W HCPCS

## 2024-10-27 PROCEDURE — 36415 COLL VENOUS BLD VENIPUNCTURE: CPT

## 2024-10-27 PROCEDURE — 99900035 HC TECH TIME PER 15 MIN (STAT)

## 2024-10-27 PROCEDURE — 21400001 HC TELEMETRY ROOM

## 2024-10-27 RX ORDER — ACETAMINOPHEN 325 MG/1
650 TABLET ORAL ONCE
Status: DISCONTINUED | OUTPATIENT
Start: 2024-10-27 | End: 2024-10-28 | Stop reason: HOSPADM

## 2024-10-27 RX ADMIN — Medication 400 ML: at 09:10

## 2024-10-27 RX ADMIN — ENOXAPARIN SODIUM 40 MG: 40 INJECTION SUBCUTANEOUS at 08:10

## 2024-10-27 RX ADMIN — OXYCODONE HYDROCHLORIDE 10 MG: 10 TABLET ORAL at 08:10

## 2024-10-27 RX ADMIN — DOCUSATE SODIUM 100 MG: 100 CAPSULE, LIQUID FILLED ORAL at 08:10

## 2024-10-27 RX ADMIN — METHOCARBAMOL 500 MG: 500 TABLET ORAL at 05:10

## 2024-10-27 RX ADMIN — SODIUM CHLORIDE 2000 MG: 1 TABLET ORAL at 09:10

## 2024-10-27 RX ADMIN — ENOXAPARIN SODIUM 40 MG: 40 INJECTION SUBCUTANEOUS at 09:10

## 2024-10-27 RX ADMIN — DOCUSATE SODIUM 100 MG: 100 CAPSULE, LIQUID FILLED ORAL at 09:10

## 2024-10-27 RX ADMIN — GABAPENTIN 300 MG: 300 CAPSULE ORAL at 09:10

## 2024-10-27 RX ADMIN — ONDANSETRON 4 MG: 2 INJECTION INTRAMUSCULAR; INTRAVENOUS at 01:10

## 2024-10-27 RX ADMIN — OXYCODONE HYDROCHLORIDE 10 MG: 10 TABLET ORAL at 01:10

## 2024-10-27 RX ADMIN — MUPIROCIN: 20 OINTMENT TOPICAL at 08:10

## 2024-10-27 RX ADMIN — Medication 400 ML: at 02:10

## 2024-10-27 RX ADMIN — SODIUM CHLORIDE 2000 MG: 1 TABLET ORAL at 08:10

## 2024-10-27 RX ADMIN — GABAPENTIN 300 MG: 300 CAPSULE ORAL at 02:10

## 2024-10-27 RX ADMIN — GABAPENTIN 300 MG: 300 CAPSULE ORAL at 08:10

## 2024-10-27 RX ADMIN — METHOCARBAMOL 500 MG: 500 TABLET ORAL at 09:10

## 2024-10-27 RX ADMIN — Medication 400 ML: at 10:10

## 2024-10-27 RX ADMIN — METHOCARBAMOL 500 MG: 500 TABLET ORAL at 01:10

## 2024-10-27 RX ADMIN — MUPIROCIN: 20 OINTMENT TOPICAL at 09:10

## 2024-10-27 RX ADMIN — Medication 400 ML: at 05:10

## 2024-10-27 RX ADMIN — OXYCODONE HYDROCHLORIDE 10 MG: 10 TABLET ORAL at 09:10

## 2024-10-27 RX ADMIN — SODIUM CHLORIDE 2000 MG: 1 TABLET ORAL at 02:10

## 2024-10-28 ENCOUNTER — HOSPITAL ENCOUNTER (INPATIENT)
Facility: HOSPITAL | Age: 74
LOS: 10 days | Discharge: HOME HEALTH | DRG: 560 | End: 2024-11-07
Attending: INTERNAL MEDICINE | Admitting: INTERNAL MEDICINE
Payer: MEDICARE

## 2024-10-28 VITALS
HEIGHT: 62 IN | BODY MASS INDEX: 24.83 KG/M2 | DIASTOLIC BLOOD PRESSURE: 71 MMHG | TEMPERATURE: 100 F | SYSTOLIC BLOOD PRESSURE: 114 MMHG | RESPIRATION RATE: 20 BRPM | OXYGEN SATURATION: 97 % | WEIGHT: 134.94 LBS | HEART RATE: 86 BPM

## 2024-10-28 DIAGNOSIS — S72.91XA CLOSED FRACTURE OF RIGHT FEMUR: ICD-10-CM

## 2024-10-28 PROBLEM — E78.5 HLD (HYPERLIPIDEMIA): Status: ACTIVE | Noted: 2024-10-28

## 2024-10-28 PROBLEM — I10 HTN (HYPERTENSION): Status: ACTIVE | Noted: 2024-10-28

## 2024-10-28 PROBLEM — G43.909 MIGRAINES: Status: ACTIVE | Noted: 2024-10-28

## 2024-10-28 PROBLEM — F41.9 ANXIETY: Status: ACTIVE | Noted: 2024-10-28

## 2024-10-28 LAB
ALBUMIN SERPL-MCNC: 2.1 G/DL (ref 3.4–4.8)
ALBUMIN/GLOB SERPL: 0.7 RATIO (ref 1.1–2)
ALP SERPL-CCNC: 293 UNIT/L (ref 40–150)
ALT SERPL-CCNC: 43 UNIT/L (ref 0–55)
ANION GAP SERPL CALC-SCNC: 7 MEQ/L
AST SERPL-CCNC: 61 UNIT/L (ref 5–34)
BASOPHILS # BLD AUTO: 0.02 X10(3)/MCL
BASOPHILS # BLD AUTO: 0.04 X10(3)/MCL
BASOPHILS NFR BLD AUTO: 0.3 %
BASOPHILS NFR BLD AUTO: 0.6 %
BILIRUB SERPL-MCNC: 1 MG/DL
BUN SERPL-MCNC: 10.6 MG/DL (ref 9.8–20.1)
CALCIUM SERPL-MCNC: 8.2 MG/DL (ref 8.4–10.2)
CHLORIDE SERPL-SCNC: 103 MMOL/L (ref 98–107)
CO2 SERPL-SCNC: 23 MMOL/L (ref 23–31)
CREAT SERPL-MCNC: 0.72 MG/DL (ref 0.55–1.02)
CREAT/UREA NIT SERPL: 15
CRP SERPL-MCNC: 141.5 MG/L
EOSINOPHIL # BLD AUTO: 0.08 X10(3)/MCL (ref 0–0.9)
EOSINOPHIL # BLD AUTO: 0.08 X10(3)/MCL (ref 0–0.9)
EOSINOPHIL NFR BLD AUTO: 1.2 %
EOSINOPHIL NFR BLD AUTO: 1.4 %
ERYTHROCYTE [DISTWIDTH] IN BLOOD BY AUTOMATED COUNT: 14.2 % (ref 11.5–17)
ERYTHROCYTE [DISTWIDTH] IN BLOOD BY AUTOMATED COUNT: 14.3 % (ref 11.5–17)
GFR SERPLBLD CREATININE-BSD FMLA CKD-EPI: >60 ML/MIN/1.73/M2
GLOBULIN SER-MCNC: 3.2 GM/DL (ref 2.4–3.5)
GLUCOSE SERPL-MCNC: 114 MG/DL (ref 82–115)
HCT VFR BLD AUTO: 24.9 % (ref 37–47)
HCT VFR BLD AUTO: 25.6 % (ref 37–47)
HGB BLD-MCNC: 8.5 G/DL (ref 12–16)
HGB BLD-MCNC: 8.7 G/DL (ref 12–16)
IMM GRANULOCYTES # BLD AUTO: 0.03 X10(3)/MCL (ref 0–0.04)
IMM GRANULOCYTES # BLD AUTO: 0.05 X10(3)/MCL (ref 0–0.04)
IMM GRANULOCYTES NFR BLD AUTO: 0.5 %
IMM GRANULOCYTES NFR BLD AUTO: 0.7 %
LYMPHOCYTES # BLD AUTO: 1.08 X10(3)/MCL (ref 0.6–4.6)
LYMPHOCYTES # BLD AUTO: 1.2 X10(3)/MCL (ref 0.6–4.6)
LYMPHOCYTES NFR BLD AUTO: 16 %
LYMPHOCYTES NFR BLD AUTO: 20.3 %
MCH RBC QN AUTO: 30.5 PG (ref 27–31)
MCH RBC QN AUTO: 30.7 PG (ref 27–31)
MCHC RBC AUTO-ENTMCNC: 34 G/DL (ref 33–36)
MCHC RBC AUTO-ENTMCNC: 34.1 G/DL (ref 33–36)
MCV RBC AUTO: 89.2 FL (ref 80–94)
MCV RBC AUTO: 90.5 FL (ref 80–94)
MONOCYTES # BLD AUTO: 0.68 X10(3)/MCL (ref 0.1–1.3)
MONOCYTES # BLD AUTO: 0.69 X10(3)/MCL (ref 0.1–1.3)
MONOCYTES NFR BLD AUTO: 10.3 %
MONOCYTES NFR BLD AUTO: 11.5 %
NEUTROPHILS # BLD AUTO: 3.91 X10(3)/MCL (ref 2.1–9.2)
NEUTROPHILS # BLD AUTO: 4.79 X10(3)/MCL (ref 2.1–9.2)
NEUTROPHILS NFR BLD AUTO: 66 %
NEUTROPHILS NFR BLD AUTO: 71.2 %
NRBC BLD AUTO-RTO: 0 %
NRBC BLD AUTO-RTO: 0 %
OHS QRS DURATION: 74 MS
OHS QTC CALCULATION: 411 MS
PLATELET # BLD AUTO: 190 X10(3)/MCL (ref 130–400)
PLATELET # BLD AUTO: 191 X10(3)/MCL (ref 130–400)
PMV BLD AUTO: 8.7 FL (ref 7.4–10.4)
PMV BLD AUTO: 9.1 FL (ref 7.4–10.4)
POTASSIUM SERPL-SCNC: 4.1 MMOL/L (ref 3.5–5.1)
PREALB SERPL-MCNC: 11.2 MG/DL (ref 14–37)
PROT SERPL-MCNC: 5.3 GM/DL (ref 5.8–7.6)
RBC # BLD AUTO: 2.79 X10(6)/MCL (ref 4.2–5.4)
RBC # BLD AUTO: 2.83 X10(6)/MCL (ref 4.2–5.4)
SODIUM SERPL-SCNC: 133 MMOL/L (ref 136–145)
WBC # BLD AUTO: 5.92 X10(3)/MCL (ref 4.5–11.5)
WBC # BLD AUTO: 6.73 X10(3)/MCL (ref 4.5–11.5)

## 2024-10-28 PROCEDURE — 25000003 PHARM REV CODE 250: Performed by: NURSE PRACTITIONER

## 2024-10-28 PROCEDURE — 80053 COMPREHEN METABOLIC PANEL: CPT

## 2024-10-28 PROCEDURE — 85025 COMPLETE CBC W/AUTO DIFF WBC: CPT

## 2024-10-28 PROCEDURE — 36415 COLL VENOUS BLD VENIPUNCTURE: CPT

## 2024-10-28 PROCEDURE — 94761 N-INVAS EAR/PLS OXIMETRY MLT: CPT

## 2024-10-28 PROCEDURE — 25000003 PHARM REV CODE 250

## 2024-10-28 PROCEDURE — 86140 C-REACTIVE PROTEIN: CPT

## 2024-10-28 PROCEDURE — 11800000 HC REHAB PRIVATE ROOM

## 2024-10-28 PROCEDURE — 63600175 PHARM REV CODE 636 W HCPCS: Performed by: NURSE PRACTITIONER

## 2024-10-28 PROCEDURE — 99900031 HC PATIENT EDUCATION (STAT)

## 2024-10-28 PROCEDURE — 63600175 PHARM REV CODE 636 W HCPCS

## 2024-10-28 PROCEDURE — 84134 ASSAY OF PREALBUMIN: CPT

## 2024-10-28 PROCEDURE — 94799 UNLISTED PULMONARY SVC/PX: CPT

## 2024-10-28 RX ORDER — DOCUSATE SODIUM 100 MG/1
100 CAPSULE, LIQUID FILLED ORAL 2 TIMES DAILY
Status: DISCONTINUED | OUTPATIENT
Start: 2024-10-28 | End: 2024-11-07 | Stop reason: HOSPADM

## 2024-10-28 RX ORDER — METHOCARBAMOL 500 MG/1
500 TABLET, FILM COATED ORAL EVERY 8 HOURS
Status: ON HOLD
Start: 2024-10-28 | End: 2024-11-07

## 2024-10-28 RX ORDER — GABAPENTIN 300 MG/1
300 CAPSULE ORAL 3 TIMES DAILY
Status: DISCONTINUED | OUTPATIENT
Start: 2024-10-28 | End: 2024-10-29

## 2024-10-28 RX ORDER — HYDRALAZINE HYDROCHLORIDE 20 MG/ML
10 INJECTION INTRAMUSCULAR; INTRAVENOUS EVERY 4 HOURS PRN
Status: DISCONTINUED | OUTPATIENT
Start: 2024-10-28 | End: 2024-11-07 | Stop reason: HOSPADM

## 2024-10-28 RX ORDER — SODIUM CHLORIDE 1 G/1
2000 TABLET ORAL 3 TIMES DAILY
Status: ON HOLD
Start: 2024-10-28 | End: 2024-11-11

## 2024-10-28 RX ORDER — OXYCODONE HYDROCHLORIDE 5 MG/1
5 TABLET ORAL EVERY 4 HOURS PRN
Status: DISCONTINUED | OUTPATIENT
Start: 2024-10-28 | End: 2024-11-07 | Stop reason: HOSPADM

## 2024-10-28 RX ORDER — HYDROXYZINE PAMOATE 50 MG/1
50 CAPSULE ORAL NIGHTLY PRN
Status: DISCONTINUED | OUTPATIENT
Start: 2024-10-28 | End: 2024-10-31

## 2024-10-28 RX ORDER — ACETAMINOPHEN 325 MG/1
650 TABLET ORAL EVERY 6 HOURS PRN
Status: DISCONTINUED | OUTPATIENT
Start: 2024-10-28 | End: 2024-11-07 | Stop reason: HOSPADM

## 2024-10-28 RX ORDER — ONDANSETRON 4 MG/1
4 TABLET, ORALLY DISINTEGRATING ORAL EVERY 6 HOURS PRN
Status: DISCONTINUED | OUTPATIENT
Start: 2024-10-28 | End: 2024-11-07 | Stop reason: HOSPADM

## 2024-10-28 RX ORDER — ONDANSETRON HYDROCHLORIDE 2 MG/ML
4 INJECTION, SOLUTION INTRAVENOUS EVERY 8 HOURS PRN
Status: DISCONTINUED | OUTPATIENT
Start: 2024-10-28 | End: 2024-11-07 | Stop reason: HOSPADM

## 2024-10-28 RX ORDER — POLYETHYLENE GLYCOL 3350 17 G/17G
17 POWDER, FOR SOLUTION ORAL EVERY 12 HOURS PRN
Status: DISCONTINUED | OUTPATIENT
Start: 2024-10-28 | End: 2024-11-07 | Stop reason: HOSPADM

## 2024-10-28 RX ORDER — DOCUSATE SODIUM 100 MG/1
100 CAPSULE, LIQUID FILLED ORAL 2 TIMES DAILY
Status: DISCONTINUED | OUTPATIENT
Start: 2024-10-28 | End: 2024-10-28 | Stop reason: SDUPTHER

## 2024-10-28 RX ORDER — METOPROLOL TARTRATE 1 MG/ML
10 INJECTION, SOLUTION INTRAVENOUS
Status: DISCONTINUED | OUTPATIENT
Start: 2024-10-28 | End: 2024-11-07 | Stop reason: HOSPADM

## 2024-10-28 RX ORDER — NITROGLYCERIN 0.4 MG/1
0.4 TABLET SUBLINGUAL EVERY 5 MIN PRN
Status: DISCONTINUED | OUTPATIENT
Start: 2024-10-28 | End: 2024-11-07 | Stop reason: HOSPADM

## 2024-10-28 RX ORDER — LABETALOL HCL 20 MG/4 ML
10 SYRINGE (ML) INTRAVENOUS EVERY 4 HOURS PRN
Status: DISCONTINUED | OUTPATIENT
Start: 2024-10-28 | End: 2024-11-07 | Stop reason: HOSPADM

## 2024-10-28 RX ORDER — HYDROCODONE BITARTRATE AND ACETAMINOPHEN 5; 325 MG/1; MG/1
1 TABLET ORAL DAILY PRN
Status: DISCONTINUED | OUTPATIENT
Start: 2024-10-28 | End: 2024-11-07 | Stop reason: HOSPADM

## 2024-10-28 RX ORDER — OXYCODONE HYDROCHLORIDE 5 MG/1
5 TABLET ORAL EVERY 4 HOURS PRN
Status: ON HOLD
Start: 2024-10-28 | End: 2024-11-04

## 2024-10-28 RX ORDER — BENZONATATE 100 MG/1
100 CAPSULE ORAL 3 TIMES DAILY PRN
Status: DISCONTINUED | OUTPATIENT
Start: 2024-10-28 | End: 2024-11-07 | Stop reason: HOSPADM

## 2024-10-28 RX ORDER — GABAPENTIN 300 MG/1
300 CAPSULE ORAL 3 TIMES DAILY
Status: ON HOLD
Start: 2024-10-28 | End: 2025-10-28

## 2024-10-28 RX ORDER — ENOXAPARIN SODIUM 100 MG/ML
40 INJECTION SUBCUTANEOUS EVERY 12 HOURS
Status: DISCONTINUED | OUTPATIENT
Start: 2024-10-28 | End: 2024-11-07 | Stop reason: HOSPADM

## 2024-10-28 RX ORDER — ONDANSETRON 4 MG/1
8 TABLET, ORALLY DISINTEGRATING ORAL EVERY 8 HOURS PRN
Status: DISCONTINUED | OUTPATIENT
Start: 2024-10-28 | End: 2024-10-28 | Stop reason: SDUPTHER

## 2024-10-28 RX ORDER — METHOCARBAMOL 500 MG/1
500 TABLET, FILM COATED ORAL EVERY 8 HOURS
Status: DISCONTINUED | OUTPATIENT
Start: 2024-10-28 | End: 2024-11-07 | Stop reason: HOSPADM

## 2024-10-28 RX ORDER — SODIUM CHLORIDE 1 G/1
2000 TABLET ORAL 3 TIMES DAILY
Status: DISCONTINUED | OUTPATIENT
Start: 2024-10-28 | End: 2024-10-31

## 2024-10-28 RX ADMIN — Medication 400 ML: at 06:10

## 2024-10-28 RX ADMIN — METHOCARBAMOL 500 MG: 500 TABLET ORAL at 06:10

## 2024-10-28 RX ADMIN — SODIUM CHLORIDE 2000 MG: 1 TABLET ORAL at 08:10

## 2024-10-28 RX ADMIN — ALPRAZOLAM 0.5 MG: 0.5 TABLET ORAL at 03:10

## 2024-10-28 RX ADMIN — ENOXAPARIN SODIUM 40 MG: 40 INJECTION SUBCUTANEOUS at 08:10

## 2024-10-28 RX ADMIN — GABAPENTIN 300 MG: 300 CAPSULE ORAL at 08:10

## 2024-10-28 RX ADMIN — Medication 400 ML: at 12:10

## 2024-10-28 RX ADMIN — OXYCODONE HYDROCHLORIDE 5 MG: 5 TABLET ORAL at 08:10

## 2024-10-28 RX ADMIN — OXYCODONE HYDROCHLORIDE 5 MG: 5 TABLET ORAL at 12:10

## 2024-10-28 RX ADMIN — Medication 400 ML: at 03:10

## 2024-10-28 RX ADMIN — DOCUSATE SODIUM 100 MG: 100 CAPSULE, LIQUID FILLED ORAL at 08:10

## 2024-10-28 RX ADMIN — HYDROXYZINE PAMOATE 50 MG: 50 CAPSULE ORAL at 11:10

## 2024-10-28 RX ADMIN — OXYCODONE HYDROCHLORIDE 5 MG: 5 TABLET ORAL at 06:10

## 2024-10-28 RX ADMIN — METHOCARBAMOL 500 MG: 500 TABLET ORAL at 08:10

## 2024-10-28 NOTE — PLAN OF CARE
Internal Medicine Discharge Summary      Admission Date:     8/28/2023  3:39 PM    Discharge Date:  8/31/2023  3:03 PM    Hospital Problems:  Principal Problem:    Bilateral pulmonary embolism (CMD)  Active Problems:    Acute pulmonary embolism, unspecified pulmonary embolism type, unspecified whether acute cor pulmonale present (CMD)    Elevated troponin    Acute deep vein thrombosis (DVT) of popliteal vein of left lower extremity (CMD)    DVT of deep femoral vein, left (CMD)         HPI    Richelle Sanchez is a 72 year old female w/PMH (per pt and chart review) including prior DVT/PE 3 years ago not on anticoagulation who presents with chest pain and shortness of breath.      The patient states that she was in her usual state of health until Wednesday, August 23rd, when she developed sudden onset chest pain. The pain was localized in the left chest, rated 7-8/10 in intensity, and described as pressure and squeezing. It was constant and slowly improved on its own but was aggravated by minimal exertion. Due to persistent symptoms, the patient visited urgent care on August 28th, where she was found to be in atrial fibrillation and subsequently sent to the ER.     The patient denies orthopnea, leg swelling, fever, cough, or GERD. She mentioned recent travel to [place] on Saturday, August 19th, before the onset of symptoms, returning on Sunday, August 27th. Her travel involved a 4-hour flight and a 2-hour car drive. She also reported prior trips to [place] in May 2023, which included 7 hours of sitting. Additionally, the patient mentioned a history of 2 prior miscarriages but denied any joint swelling or rashes. She has no personal history of cancer but mentioned that her sister was diagnosed with breast cancer.     Notably, the patient reported a previous episode of pulmonary embolism 3 years ago that was provoked by travel, and anticoagulation was discontinued.     In the ER, the patient was found to be  Jerilyn Fernandez age: 74 * RLE WBAT, Full ROM    Dx: Fall in shower, right femoral shaft fracture s/p IM rodding 10/22/2024. Pt. Has a history of HTN, HLD, anxiety, and migraines.  *See chart for full and complete medical history*     Hx mental health/substance abuse: Pt. Has a history of anxiety. Denies substance abuse history    Insurance: Medicare Part A&B     Is there evidence of an acute change in mental status from the patients baseline? No   Education//Work Hx: Pt. Has a GED. She has no  history. She is retired. She worked in the [employer] office for 28 years.   Pt. Is  to Brooks Fernandez ([phone]). Brooks is retired. He states that they share most household duties. He states that he does a little more than she does because he is 11 years younger than her. He also manages finances with the patient and does yardwork.   Children: (0) /Friends/Family: 1) Brooks Fernandez, spouse ([phone])   Power of  (no) Living Will (no)      Prior Level of Fx: Independent ADLs, drives but not often, does chores, does laundry, grocery shops, manages finances, manages her won medications, works in the flower beds, and cares for the dog.  She does not have a medic alert.    Residence: Pt. Lives with her spouse in [place] in a single-story home with no steps to enter the residence. She has a walk-in shower with a threshold and HHS. She does not have grab bars. She also has a tub with no grab bars or HHS. She does not have an elevated toilet. No grab bars.   DME: 4 prong cane, HHS, and rollator. She will use [employer] for DME.    HH/OP tx: Denies HH history. The patients  would like more information about HH.      FOC obtained for [employer] for DME.   Pharmacy: [phone] / (has prescription drug coverage)    MD(s): PCP: Tesfaye Lion PA-C; Cardiologist: Dr. Barnahrt ([phone]) Follow-up 10/31/2024 2:20 PM; Ortho: Dr. Mingo Sherman ([phone]) Appointment in 2 weeks for  tachycardic but normotensive and was tolerating room air. An EKG indicated sinus tachycardia with a Q3, T3 pattern. Laboratory and imaging findings were suggestive of PE with myocardial strain. Cardiology was consulted from the ER, and their recommendation was to continue the heparin infusion and plan for EKOS if the transthoracic echocardiogram showed signs of right ventricular strain, as both the PESI score and clinical presentation were reassuring.    Hospital course:   CT showed that patient had RV strain as well as a bilateral PE she was started on heparin drip and underwent an EKOS procedure.  She was then transferred to the ICU.  On the ICU she was transitioned off of the heparin drip to p.o. Xarelto.  She did not require any pressors.  She remained hemodynamically stable and was transferred to telemetry on August 30.  He was evaluated by the floor team and the patient did not have any oxygen requirements throughout her stay.  It was recommended the patient follow-up with a hematologist possibly for an outpatient work-up as this is the second time patient has experienced venous thromboembolism.  He was discharged on Xarelto 15 mg twice daily to Be continued for the next 19 days and followed by Xarelto 20 mg daily indefinitely.  Patient was agreeable to the plan and was discharged in stable condition.    Imaging:     US Vascular Lower Extremity Venous Duplex Above the Knee   Final Result by Warner Arzola MD (08/29 1629)   FINDINGS/IMPRESSION:    Exam positive for acute DVT in the left popliteal and femoral veins. No   right DVT.      Findings communicated with Dr. Baptiste at 3:40 p.m. on 8/29/2023 by Dr. Marquis   via [employer] with read receipt.      Dictated by: JAZZ PARKS MD   Dictated on: 8/29/2023 3:30 PM          WARNER GUERRERO M.D., have reviewed the images and report and concur   with these findings interpreted by JAZZ PARKS MD.      Electronically Signed by: WARNER ARZOLA  wound check and suture/staple removal post op.          M.D.    Signed on: 8/29/2023 4:29 PM    Workstation ID: [id number]      Cath/PV Case   Final Result by Soy Gaitan MD (08/29 0954)      CTA CHEST PULMONARY EMBOLISM   Final Result by Betzaida Bal MD (08/28 1805)      Extensive bilateral pulmonary emboli involving the right main pulmonary   artery and all bilateral lobar branches.      Right heart strain, evidenced by RV to LV ratio of 1.5 and reflux of   intravenous contrast into the IVC and right hepatic vein.         Dr. Barbara Patel notified Dr. Raymond of extensive bilateral pulmonary emboli   with evidence of right heart strain via phone call at 5:11 p.m. on   8/28/2023.      Dictated by: BARBARA PATEL M.D.   Dictated on: 8/28/2023 5:07 PM          I, BETZAIDA BAL M.D., have reviewed the images and report and concur with   these findings interpreted by BARBARA PATEL M.D..      Electronically Signed by: BETZAIDA BAL M.D.    Signed on: 8/28/2023 6:05 PM    Workstation ID: [id number]      XR CHEST PA AND LATERAL 2 VIEWS   Final Result by Yolanda Acuña MD (08/28 1630)   No acute cardiopulmonary abnormality.      Electronically Signed by: YOLANDA ACUÑA M.D.    Signed on: 8/28/2023 4:30 PM    Workstation ID: [id number]          Encounter Date: 08/28/23   Electrocardiogram 12-Lead   Result Value    Ventricular Rate EKG/Min (BPM) 106    Atrial Rate (BPM) 106    NC-Interval (MSEC) 140    QRS-Interval (MSEC) 84    QT-Interval (MSEC) 364    QTc 483    P Axis (Degrees) 57    R Axis (Degrees) 104    T Axis (Degrees) 1    REPORT TEXT      Sinus tachycardia  Rightward axis  Consider RVH  T wave abnormality  , consider ischemia  Prolonged QT  Confirmed by AQUILINO SLOAN MD ([id number]) on 8/30/2023 10:08:09 PM           Discharge condition: Stable    Vitals:    Vital Last Value 24 Hour Range   Temperature 98.1 °F (36.7 °C) (08/31/23 1052) Temp  Min: 98.1 °F (36.7 °C)  Max: 98.8 °F (37.1 °C)   Pulse 95 (08/31/23 1052) Pulse  Min: 95  Max: 105    Respiratory 16 (08/31/23 1052) Resp  Min: 16  Max: 18   Non-Invasive  Blood Pressure 100/68 (08/31/23 1052) BP  Min: 100/68  Max: 117/80   Pulse Oximetry 98 % (08/31/23 1052) SpO2  Min: 92 %  Max: 98 %   Arterial   Blood Pressure   No data recorded        Physical Exam:  Physical Exam  Vitals reviewed.   Constitutional:       Appearance: Normal appearance.   Cardiovascular:      Rate and Rhythm: Normal rate and regular rhythm.      Pulses: Normal pulses.      Heart sounds: Normal heart sounds.   Pulmonary:      Effort: Pulmonary effort is normal.      Breath sounds: Normal breath sounds.   Abdominal:      General: Abdomen is flat.   Neurological:      General: No focal deficit present.      Mental Status: She is alert and oriented to person, place, and time.   Psychiatric:         Mood and Affect: Mood normal.         Behavior: Behavior normal.         Labs:  Recent Results (from the past 24 hour(s))   Partial Thromboplastin Time (PTT)    Collection Time: 08/31/23  7:49 AM   Result Value Ref Range    PTT 35 (H) 22 - 30 sec   Magnesium    Collection Time: 08/31/23  7:49 AM   Result Value Ref Range    Magnesium 2.1 1.7 - 2.4 mg/dL   Comprehensive Metabolic Panel    Collection Time: 08/31/23  7:49 AM   Result Value Ref Range    Fasting Status      Sodium 140 135 - 145 mmol/L    Potassium 3.9 3.4 - 5.1 mmol/L    Chloride 107 97 - 110 mmol/L    Carbon Dioxide 26 21 - 32 mmol/L    Anion Gap 11 7 - 19 mmol/L    Glucose 102 (H) 70 - 99 mg/dL    BUN 20 6 - 20 mg/dL    Creatinine 0.87 0.51 - 0.95 mg/dL    Glomerular Filtration Rate 70 >=60    BUN/Cr 23 7 - 25    Calcium 8.2 (L) 8.4 - 10.2 mg/dL    Bilirubin, Total 0.4 0.2 - 1.0 mg/dL    GOT/AST 24 <=37 Units/L    GPT/ALT 26 <64 Units/L    Alkaline Phosphatase 63 45 - 117 Units/L    Albumin 2.7 (L) 3.6 - 5.1 g/dL    Protein, Total 6.7 6.4 - 8.2 g/dL    Globulin 4.0 2.0 - 4.0 g/dL    A/G Ratio 0.7 (L) 1.0 - 2.4   CBC with Automated Differential (performable only)     Collection Time: 08/31/23  7:49 AM   Result Value Ref Range    WBC 6.8 4.2 - 11.0 K/mcL    RBC 4.06 4.00 - 5.20 mil/mcL    HGB 12.1 12.0 - 15.5 g/dL    HCT 36.8 36.0 - 46.5 %    MCV 90.6 78.0 - 100.0 fl    MCH 29.8 26.0 - 34.0 pg    MCHC 32.9 32.0 - 36.5 g/dL    RDW-CV 15.5 (H) 11.0 - 15.0 %    RDW-SD 51.8 (H) 39.0 - 50.0 fL     140 - 450 K/mcL    NRBC 0 <=0 /100 WBC    Neutrophil, Percent 61 %    Lymphocytes, Percent 28 %    Mono, Percent 7 %    Eosinophils, Percent 4 %    Basophils, Percent 0 %    Immature Granulocytes 0 %    Absolute Neutrophils 4.2 1.8 - 7.7 K/mcL    Absolute Lymphocytes 1.9 1.0 - 4.0 K/mcL    Absolute Monocytes 0.5 0.3 - 0.9 K/mcL    Absolute Eosinophils  0.2 0.0 - 0.5 K/mcL    Absolute Basophils 0.0 0.0 - 0.3 K/mcL    Absolute Immature Granulocytes 0.0 0.0 - 0.2 K/mcL       Test Results Pending At Discharge:      Immunizations:  If indicated was offered.    Discharge Medications:     Summary of your Discharge Medications      Take these Medications      Details   acetaminophen 325 MG tablet  Commonly known as: TYLENOL   Take 650 mg by mouth every 4 hours as needed for Pain.     atorvastatin 20 MG tablet  Commonly known as: LIPITOR   [place] 1 tableta por vía oral cada noche.  (Take 1 tablet by mouth nightly.)     * Xarelto 15 MG Tab   Generic drug: rivaroxaban  [place] 1 tableta por vía oral 2 veces al día (con las comidas).  (Take 1 tablet by mouth 2 times daily (with meals).)     * Xarelto 20 MG Tab  Start taking on: September 19, 2023   Generic drug: rivaroxaban  Take 1 tablet by mouth daily (with dinner). Do not start before September 19, 2023.         * This list has 2 medication(s) that are the same as other medications prescribed for you. Read the directions carefully, and ask your doctor or other care provider to review them with you.                Discharge Diagnosis:  Bilateral pulmonary embolism (CMD)     PCP:   Stephen Sherman PA-C - updated    Consultants:   IP Consult  Orders (From admission, onward)     Start     Ordered    08/28/23 1609  Inpatient consult to Cardiology  ONE TIME        Provider:  Joe Curtis MD    08/28/23 1606                 Follow up:  No follow-ups on file.      D/w: Attending Physician Dr. Dylan Allen MD MPH  Internal Medicine  PGY-2  Please message through [employer]

## 2024-10-28 NOTE — CONSULTS
Dos 10/29/24  I have evaluated the patient on initial IRF admit. I have been involved in rehab prescreen. I have reviewed records.I have reviewed admit orders.  I find the patient appropriate for, in need of and should tolerate an aggressive IRF program with good potential for functional improvement.  I am in agreement with initial Plan of Care  I have been involved in the creation of this initial PM&R consult with Isabella Navas MD  Chief Complaint  displaced femoral diaphyseal fracture 2/2 fall    Reason for Consultation  Physiatry    History of Present Illness  Admit MD: Alexander Kitchen MD  Consult Physiatry: Isaiah Navas MD  HPI: 73 yo WF with a PMH of HTN, HLD, anxiety, and migraines presented to the ED at [facility] on 10/21/24 after falling in the shower at home. Patient reported right lower extremity pain, and admits to having hit her head with a small bump noted to right temporal area. Denied LOC or blood thinners. Right femur XR showed displaced femoral diaphyseal fracture. Chest XR was negative for acute findings. Orthopedic surgeon consulted with recommendation for surgical intervention needed. On 10/22, patient underwent a right intramedullary stabilization of the femur shaft fracture by Dr. MELIA Sherman. Patient was placed WBAT to RLE with full range of motion of the right lower extremity. Started on Lovenox for DVT ppx. Hospital medicine was consulted for multiple runs of SVT post op, and was given Esmolol. EKG showed sinus tachycardia with T-wave abnormality, consider inferior ischemia. Cardiology ([facility]) was consulted for evaluation and treatment of SVT. Started on Metoprolol tart 12.5mg PO BID, gave 2gm of MGSO4 x 1 dose, and checked TSH and free T4 which were both WNL. On 10/23, PT/OT evals completed with deficits noted with recommendation for high intensity therapy needed. NSR noted to telemetry, and BP borderline low. Labs showed low Na of 130, elevated potassium of 5.2, elevated Cr of 1.08,  low calcium of 8.1, low H&H of 6.8 & 20.2. Patient was transfused 2 units of PRBCs. On 10/24, labs showed low H&H of 6.4 & 18.6 post transfusion of 2 units so was transfused another 2 units of PRBCs, and Low Na of 125 so salt tabs given. BP remained low so betablocker held if SBP <100 and flomax held. Cardiology recommended patient to follow up with Dr Barnhart on discharge with no further recommendations, and signed off of case. On 10/25, labs showed low H&H of 8.3 & 23.1, low Na of 132, and low PLT of 110. Daily dressing changes started, no creams or ointments, and ok to shower on POD#7. On 10/26, labs showed low H&H of 7.5 & 21.9 so 1 unit of PRBCs given. On 10/27, labs showed low H&H of 9.2 & 27.0, low Na of 135, low calcium of 8.1. On 10/28, labs showed low H&H of 8.5 & 24.9, low Na of 133, low Ca of 8.2, elevated ALP o 293, low albumin of 2.1, elevated AST of 61, elevated CRP of 141.50. Patient will need follow up appt with Dr Sherman in 2 weeks. Okay for ASA 81mg BID on DC. Patient reported last BM on 10/20. Patient is AAOx4.  Participating with therapy. Functional status includes setup assist needed for eating, moderate assist for bed mobility, moderate assist or transfers with RW, walked 7ft with RW at moderate assist, max assist for lower body dressing, total assist for toileting with purwick in place, and setup assist for grooming while seated. Patient was evaluated, accepted, and admitted to inpatient rehab to improve functional status. Transferred to [facility] on 10/28 without incident.      10/29: Seen in patient room, seated in bed with breakfast in front of her. Reports eating a little more than she had been. Tells me that she spoke with the dietician and they are going to send her a Boost fruit drink as she does not care for the milk based ones. States that she has made a decision that she will make herself eat so she can get stronger and go home. Reports small soft BM a couple days ago, and her typical  routine being every 4-5 days. Concerned that an intervention may cause incontinence and does not want to have to burden staff. Reassured her that our staff is available for assistance and we want her to call so we can keep her safe. We will adjust bowel regimen as needed, but like to see a BM atleast every 3 days. Encouraged fluids. Reports having a bad nightmare last night. Discussed medication side effects, and this being known to happen with gabapentin. Admits to having nerve pain in her leg. Adjust medications. Therapy to evaluate. VSSAF.             Review of Systems  Barriers to Discharge:  Social: Has a GED. She has no  history. She is retired. She worked in the [employer] office for 28 years.  is retired. He states that they share most household duties. He states that he does a little more than she does because he is 11 years younger than her. He also manages finances with the patient and does yardwork.   Children: (0)    Medical: Right femur fracture 2/2 fall s/p IM stabilization on 10/22, hypertension and hyperlipidemia      Functional:   Prior Level of Fx: Independent ADLs, drives but not often, does chores, does laundry, grocery shops, manages finances, manages her won medications, works in the flower beds, and cares for the dog.  She does not have a medic alert.    Residence: Lives with her spouse in [place] in a single-story home with no steps to enter the residence. She has a walk-in shower with a threshold and HHS. She does not have grab bars. She also has a tub with no grab bars or HHS. She does not have an elevated toilet. No grab bars.   DME: 4 prong cane, HHS, and rollator.    Psychiatric: Has a history of anxiety. Denies substance abuse history      Depression/Anxiety: no complaints     Pain: RLE     gabapentin capsule 300 mg TID. Change to before meals  methocarbamoL tablet 500 mg q8h  acetaminophen tablet 650 mg q6h PRN mild pain  oxyCODONE immediate release tablet 5 mg q4h PRN  mod/severe pain  Bowels/Bladder: last BM 10/27 (small soft per patient); previous 10/20    Appetite: decreased. Forcing herself to eat for strength     Sleep: c/o nightmare. Don't give gabapentin near bedtime              Physical Exam  General: well-developed, well-nourished, in no acute distress  Eye: EOMI, clear conjunctiva, eyelids normal  HENT: normocephalic, oropharynx and nasal mucosal surfaces moist  Neck: full range of motion, supple  Respiratory: equal chest rise, no SOB, no audible wheeze  Cardiovascular: regular rate and rhythm, no edema  Gastrointestinal: soft, non-tender, non-distended   Musculoskeletal: decreased ROM/strength to RLE  Integumentary: right upper thigh (groin) skin tear, incision, and blackened scab, right lateral thigh and knee incisions-staples, dressings-c/d/i  Neurologic: cranial nerves intact, no signs of peripheral neurological deficit, motor/sensory function intact  *MD performed and documented physical examination         Labs:   Latest Reference Range & Units 10/29/24 05:07   WBC 4.50 - 11.50 x10(3)/mcL 5.81   RBC 4.20 - 5.40 x10(6)/mcL 2.89 (L)   Hemoglobin 12.0 - 16.0 g/dL 8.9 (L)   Hematocrit 37.0 - 47.0 % 26.5 (L)   MCV 80.0 - 94.0 fL 91.7   MCH 27.0 - 31.0 pg 30.8   MCHC 33.0 - 36.0 g/dL 33.6   RDW 11.5 - 17.0 % 14.2   Platelet Count 130 - 400 x10(3)/mcL 223   MPV 7.4 - 10.4 fL 9.3   Neut % % 64.6   LYMPH % % 21.0   Mono % % 10.3   Eos % % 2.9   Basophil % % 0.7   Immature Granulocytes % 0.5   Neut # 2.1 - 9.2 x10(3)/mcL 3.75   Lymph # 0.6 - 4.6 x10(3)/mcL 1.22   Mono # 0.1 - 1.3 x10(3)/mcL 0.60   Eos # 0 - 0.9 x10(3)/mcL 0.17   Baso # <=0.2 x10(3)/mcL 0.04   Immature Grans (Abs) 0 - 0.04 x10(3)/mcL 0.03   nRBC % 0.0   Iron 50 - 170 ug/dL 31 (L)   TIBC 250 - 450 ug/dL 144 (L)   UIBC 70 - 310 ug/dL 113   Transferrin 173 - 360 mg/dL 129 (L)   Ferritin 4.63 - 204.00 ng/mL 368.39 (H)   Iron Saturation 20 - 50 % 22   Sodium 136 - 145 mmol/L 137   Potassium 3.5 - 5.1 mmol/L 4.1    Chloride 98 - 107 mmol/L 104   CO2 23 - 31 mmol/L 23   Anion Gap mEq/L 10.0   BUN 9.8 - 20.1 mg/dL 12.3   Creatinine 0.55 - 1.02 mg/dL 0.71   BUN/CREAT RATIO  17   eGFR mL/min/1.73/m2 >60   Glucose 82 - 115 mg/dL 96   Calcium 8.4 - 10.2 mg/dL 8.5   ALP 40 - 150 unit/L 267 (H)   PROTEIN TOTAL 5.8 - 7.6 gm/dL 5.4 (L)   Albumin 3.4 - 4.8 g/dL 2.2 (L)   Albumin/Globulin Ratio 1.1 - 2.0 ratio 0.7 (L)   Prealbumin 14.0 - 37.0 mg/dL 11.0 (L)   BILIRUBIN TOTAL <=1.5 mg/dL 1.2   AST 5 - 34 unit/L 42 (H)   ALT 0 - 55 unit/L 40   Globulin, Total 2.4 - 3.5 gm/dL 3.2   (L): Data is abnormally low  (H): Data is abnormally high              Diagnostics:  XR FEMUR 2 VIEW RIGHT   FINDINGS:  There is a displaced oblique fracture of the mid to distal femoral diaphysis.  Vascular calcifications are noted  Impression:  Displaced femoral diaphyseal fracture.  Date:                                            10/21/2024  Time:                                           11:55      XR CHEST AP PORTABLE   Impression:  No acute chest disease is identified.  Hiatus hernia  Date:                                            10/21/2024  Time:                                           11:47      XR PELVIS ROUTINE AP   Impression:  No displaced pelvic fracture identified.  Date:                                            10/21/2024  Time:                                           11:56        Assessment/Plan  Hospital   Closed fracture of right femur   Fall   Acute blood loss anemia     Non-Hospital   HTN (hypertension)   HLD (hyperlipidemia)   Anxiety   Migraines       Wounds: right upper thigh (groin) skin tear, incision, and blackened scab, right lateral thigh and knee incisions-staples, dressings-c/d/i  On 10/22, patient underwent a right intramedullary stabilization of the femur shaft fracture by Dr. MELIA Sherman.   Precautions: Patient was placed WBAT to RLE with full range of motion of the right lower extremity.  Bracing/AD: RW  Swallowing:  Regular Diet  Function: Tolerating therapy. Continue PT/OT  VTE Prophylaxis:   enoxaparin injection 40 mg SubQ q12h  Code Status: FULL CODE   Discharge:Lives with her spouse in [place] in a single-story home with no steps to enter the residence. Has a GED. She has no  history. She is retired. She worked in the [employer] office for 28 years.  is retired. He states that they share most household duties. He states that he does a little more than she does because he is 11 years younger than her. He also manages finances with the patient and does yardwork. Children: (0). Date pending.                 Catalina Silver NP, conducted additional independent physical examination and assisted with medical documentation.

## 2024-10-28 NOTE — CONSULTS
Inpatient Nutrition Assessment    Admit Date: 10/28/2024   Total duration of encounter: 1 day   Patient Age: 74 y.o.    Nutrition Recommendation/Prescription     No diet ordered at this time. Rec'd Regular diet as medically appropriate.  Add Boost Plus BID per pt request (provides 360 kcal and 14 g pro per serving).  If poor po intake continues, consider appetite stimulant as medically feasible.  Bowel regimen per MD.  Weekly weights.  Will continue to monitor wt, labs, and po intake.    Communication of Recommendations: reviewed with patient    Nutrition Assessment     Malnutrition Assessment/Nutrition-Focused Physical Exam    Malnutrition Context: acute illness or injury (10/28/24 1540)  Malnutrition Level:  (does not meet criteria) (10/28/24 1540)  Energy Intake (Malnutrition): less than or equal to 50% for greater than or equal to 5 days (10/28/24 1540)  Weight Loss (Malnutrition):  (does not meet criteria) (10/28/24 1540)  Subcutaneous Fat (Malnutrition):  (does not meet criteria) (10/28/24 1540)           Muscle Mass (Malnutrition):  (does not meet criteria) (10/28/24 1540)                          Fluid Accumulation (Malnutrition):  (does not meet criteria) (10/28/24 1540)        A minimum of two characteristics is recommended for diagnosis of either severe or non-severe malnutrition.    Chart Review    Reason Seen: physician consult for eval and treat    Malnutrition Screening Tool Results   Have you recently lost weight without trying?: No  Have you been eating poorly because of a decreased appetite?: No   MST Score: 0   Diagnosis:  Closed fracture of right femur 10/21/2024      Fall 10/21/2024      Acute blood loss anemia      Relevant Medical History: HTN, HLD, anxiety and migraines     Scheduled Medications:  docusate sodium, 100 mg, BID  enoxaparin, 40 mg, Q12H  gabapentin, 300 mg, TID  methocarbamoL, 500 mg, Q8H  sodium chloride, 2,000 mg, TID    Continuous Infusions:   PRN Medications:  acetaminophen,  650 mg, Q6H PRN  benzonatate, 100 mg, TID PRN  hydrALAZINE, 10 mg, Q4H PRN  HYDROcodone-acetaminophen, 1 tablet, Daily PRN  hydrOXYzine pamoate, 50 mg, Nightly PRN  labetalol, 10 mg, Q4H PRN  metoprolol, 10 mg, Q2H PRN  nitroGLYCERIN, 0.4 mg, Q5 Min PRN  ondansetron, 4 mg, Q6H PRN  ondansetron, 4 mg, Q8H PRN  oxyCODONE, 5 mg, Q4H PRN  polyethylene glycol, 17 g, Q12H PRN    Calorie Containing IV Medications: no significant kcals from medications at this time    Recent Labs   Lab 10/22/24  0334 10/23/24  0356 10/23/24  1437 10/24/24  0504 10/25/24  0614 10/26/24  0354 10/27/24  0423 10/28/24  0424 10/28/24  1109   * 130*  --  125* 132* 136 135* 133*  --    K 4.7 5.2*  --  4.5 4.6 4.3 4.5 4.1  --    CALCIUM 8.5 8.1*  --  7.3* 7.6* 7.7* 8.1* 8.2*  --    PHOS 5.0*  --   --  2.2*  --   --   --   --   --    MG 1.90  --   --  2.00  --   --   --   --   --     101  --  101 104 107 104 103  --    CO2 21* 23  --  20* 23 23 21* 23  --    BUN 16.0 17.0  --  14.8 12.5 11.6 10.7 10.6  --    CREATININE 1.12* 1.08*  --  0.90 0.73 0.68 0.72 0.72  --    EGFRNORACEVR 51 54  --  >60 >60 >60 >60 >60  --    GLUCOSE 95 123*  --  119* 108 101 98 114  --    BILITOT 0.4 0.4  --  0.6 0.7 0.6 1.1 1.0  --    ALKPHOS 67 57  --  68 115 148 250* 293*  --    ALT 16 10  --  6 11 15 26 43  --    AST 22 20  --  24 32 28 38* 61*  --    ALBUMIN 3.3* 2.9*  --  2.3* 2.2* 2.2* 2.2* 2.1*  --    PREALB  --   --   --  12.4*  --   --   --  11.2*  --    CRP  --   --   --  224.50*  --   --   --  141.50*  --    WBC 6.09 6.65 7.37 7.10 6.41 4.57 6.13 6.73 5.92   HGB 8.1* 6.8* 8.1* 6.4* 8.3* 7.5* 9.2* 8.5* 8.7*   HCT 24.2* 20.2* 23.2* 18.6* 23.1* 21.9* 27.0* 24.9* 25.6*     Nutrition Orders:  No diet orders on file      Appetite/Oral Intake: poor/25-50% of meals  Factors Affecting Nutritional Intake: decreased appetite  Social Needs Impacting Access to Food: none identified  Food/Church/Cultural Preferences: none reported  Food Allergies: no known food  "allergies  Last Bowel Movement: 10/27/24  Wound(s):  surgical incisions    Comments    10/28/24: Pt reports decreased appetite since her surgery (10/23). Eating between 25-50% of meals. Pt states she does not know the cause, she just isn't hungry. Pt denies issues c/s, reports nausea off and on. Denies vomiting, diarrhea, constipation. Pt denies recent unintentional wt loss. Pt agreed to trial ONS. Can switch to Boost Breeze if Boost Plus causes gi intolerance.    Anthropometrics    Height: 5' 2" (157.5 cm), Height Method: Stated  Last Weight: 65 kg (143 lb 4.8 oz) (10/28/24 1525), Weight Method: Standard Scale  BMI (Calculated): 26.2  BMI Classification: overweight (BMI 25-29.9)     Ideal Body Weight (IBW), Female: 110 lb     % Ideal Body Weight, Female (lb): 130.27 %                    Usual Body Weight (UBW), k.54 kg  % Usual Body Weight: 109.4     Usual Weight Provided By: patient    Wt Readings from Last 5 Encounters:   10/28/24 65 kg (143 lb 4.8 oz)   10/22/24 61.2 kg (134 lb 14.7 oz)     Weight Change(s) Since Admission: new admit  Wt Readings from Last 1 Encounters:   10/28/24 1525 65 kg (143 lb 4.8 oz)   Admit Weight: 65 kg (143 lb 4.8 oz) (10/28/24 1525), Weight Method: Standard Scale    Estimated Needs    Weight Used For Calorie Calculations: 61.2 kg (134 lb 14.7 oz)  Energy Calorie Requirements (kcal): 4971-3648 kcal (30-35 kcal/kg)  Energy Need Method: Kcal/kg  Weight Used For Protein Calculations: 61.2 kg (134 lb 14.7 oz)  Protein Requirements: 74-86 g (1.2-1.4 g/kg)  Fluid Requirements (mL): 1836 mL/d (1.0 mL/kcal)        Enteral Nutrition     Patient not receiving enteral nutrition at this time.    Parenteral Nutrition     Patient not receiving parenteral nutrition support at this time.    Evaluation of Received Nutrient Intake    Calories: not meeting estimated needs  Protein: not meeting estimated needs    Patient Education     Not applicable.    Nutrition Diagnosis     PES: Inadequate oral " intake related to acute illness as evidenced by 37.5% avg po intake x 4 meals. (new)     Nutrition Interventions     Intervention(s): general/healthful diet, commercial beverage, multivitamin/mineral supplement therapy, prescription medication, and collaboration with other providers    Goal: Meet greater than 80% of nutritional needs by follow-up. (new)  Goal: Maintain weight throughout hospitalization. (new)    Nutrition Goals & Monitoring     Dietitian will monitor: food and beverage intake, energy intake, weight, food/nutrition knowledge skill, and gastrointestinal profile  Discharge planning: resume home regimen  Nutrition Risk/Follow-Up: moderate (follow-up in 3-5 days)   Please consult if re-assessment needed sooner.

## 2024-10-28 NOTE — H&P
[facility] ([facility])  Rehab Admission H&P    Patient Name: Jerilyn Fernandez  MRN: [id number]  Age: 74 y.o. Sex: female  : 1950  Hospital Length of Stay: 0 days  Date of Service: 10/28/2024   Chief Complaint:  Right femur fracture 2/2 fall s/p IM stabilization on 10/22    Subjective:   History of Present Illness   74-year-old WF presented to [facility] on 10/21 via EMS with complaints of right-sided hip pain after slipping and falling at home.  PMH significant for hypertension and hyperlipidemia.  Workup significant for right femur fracture.  On 10/22 tolerated IM stabilization without perioperative complications.  Postoperatively reported SVT requiring esmolol then transition to normal sinus rhythm.  Initiated metoprolol tartrate 12.5 mg b.i.d. per Cardiology.  Continued with orthostatic hypotension and trauma surgery held beta-blockers and Flomax.  Required 1 unit PRBC transfusion on 10/26. Tolerated transfer to [facility] inpatient rehab unit on 10/28 without incident.    Tolerated transfer.  Sitting in bed comfortably.  Reports good sleep and appetite.  Last BM 10/27.  Vital signs at goal with no recent recorded fevers.  Most recent labs and imaging reviewed and documented below.    Past Medical History: Right femur fracture 2/2 fall s/p IM stabilization on 10/22, hypertension and hyperlipidemia  Procedure history:  Right femur fracture 2/2 fall s/p IM stabilization on 10/22, Foot surgery  Family History:  Mother:  History of diabetes type 2  Social History:  Has GED.  Retired.  Worked in the [employer] office for 28 years.   to Raji.   is retired and they share most household duties.   manages finances and does yd work.  No children.  (-) TOB.     (-) ETOH.   (-) Illicit drug use.   Prior level of function:  Independent in ADLs, does not drive often, does chores, laundry, grocery shops, manages finances, manages own medications.  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  Does not have medical alert.  Residence:  Lives with her spouse in [place] in a single-story home with no steps to enter the residence.  Has a walk-in shower with a threshold and HHS.  Does not have grab bars.  Also has a tub with no grab bars or HHS.  Does not have an elevated toilet.  No grab bars.  DME:  4 prong cane, HHS, Rollator  Anticipated discharge destination:  Home with home health    Review of patient's allergies indicates:  No Known Allergies     Current Facility-Administered Medications:     acetaminophen tablet 650 mg, 650 mg, Oral, Q6H PRN, Briana Andrade B, FNP    benzonatate capsule 100 mg, 100 mg, Oral, TID PRN, Briana Andrade, FNP    docusate sodium capsule 100 mg, 100 mg, Oral, BID, Briana Andrade B, FNP, 100 mg at 10/29/24 0743    enoxaparin injection 40 mg, 40 mg, Subcutaneous, Q12H, Briana Andrade, FNP, 40 mg at 10/29/24 0743    gabapentin capsule 300 mg, 300 mg, Oral, TID, Briana Andrade B, FNP, 300 mg at 10/29/24 0521    hydrALAZINE injection 10 mg, 10 mg, Intravenous, Q4H PRN, Briana Andrade, FNP    HYDROcodone-acetaminophen 5-325 mg per tablet 1 tablet, 1 tablet, Oral, Daily PRN, Briana Andrade B, FNP    hydrOXYzine pamoate capsule 50 mg, 50 mg, Oral, Nightly PRN, Briana Andrade, FNP, 50 mg at 10/28/24 2331    labetalol 20 mg/4 mL (5 mg/mL) IV syring, 10 mg, Intravenous, Q4H PRN, Briana Andrade B, FNP    methocarbamoL tablet 500 mg, 500 mg, Oral, Q8H, Briana Andrade B, FNP, 500 mg at 10/29/24 0521    metoprolol injection 10 mg, 10 mg, Intravenous, Q2H PRN, Briana Andrade B, FNP    nitroGLYCERIN SL tablet 0.4 mg, 0.4 mg, Sublingual, Q5 Min PRN, Briana Andrade, FNP    ondansetron disintegrating tablet 4 mg, 4 mg, Oral, Q6H PRN, Briana Andrade FNP    ondansetron injection 4 mg, 4 mg, Intravenous, Q8H PRN, Briana Andrade FNP    oxyCODONE immediate release tablet 5 mg, 5 mg, Oral, Q4H PRN, Briana Andrade, BRYON, 5 mg at 10/29/24 0743    polyethylene  "glycol packet 17 g, 17 g, Oral, Q12H PRN, Briana Andrade, FNP    sodium chloride oral tablet 2,000 mg, 2,000 mg, Oral, TID, Briana Andrade, FNP, 2,000 mg at 10/29/24 0521     Review of Systems   Complete 12-point review of symptoms negative except for what's mentioned in HPI     Objective:     /75   Pulse 93   Temp 98.3 °F (36.8 °C) (Oral)   Resp 18   Ht 5' 2" (1.575 m)   Wt 65 kg (143 lb 4.8 oz)   SpO2 (!) 94%   BMI 26.21 kg/m²     Physical Exam  Vitals reviewed.   Eyes:      Pupils: Pupils are equal, round, and reactive to light.   Cardiovascular:      Rate and Rhythm: Normal rate and regular rhythm.      Heart sounds: Normal heart sounds.   Pulmonary:      Effort: Pulmonary effort is normal.      Breath sounds: Normal breath sounds.   Abdominal:      General: Bowel sounds are normal.   Musculoskeletal:         General: Normal range of motion.   Skin:     General: Skin is warm.      Comments: Right lower extremity incision dry and intact   Neurological:      General: No focal deficit present.      Mental Status: She is alert and oriented to person, place, and time.      Motor: Weakness present.   Psychiatric:         Mood and Affect: Mood normal.     *MD performed and documented physical examination       Lines/Drains/Airways       Peripheral Intravenous Line  Duration                  Peripheral IV - Single Lumen 10/21/24 1136 20 G Anterior;Right Forearm 7 days                    Labs  Recent Results (from the past 24 hours)   CBC with Differential    Collection Time: 10/28/24 11:09 AM   Result Value Ref Range    WBC 5.92 4.50 - 11.50 x10(3)/mcL    RBC 2.83 (L) 4.20 - 5.40 x10(6)/mcL    Hgb 8.7 (L) 12.0 - 16.0 g/dL    Hct 25.6 (L) 37.0 - 47.0 %    MCV 90.5 80.0 - 94.0 fL    MCH 30.7 27.0 - 31.0 pg    MCHC 34.0 33.0 - 36.0 g/dL    RDW 14.3 11.5 - 17.0 %    Platelet 191 130 - 400 x10(3)/mcL    MPV 8.7 7.4 - 10.4 fL    Neut % 66.0 %    Lymph % 20.3 %    Mono % 11.5 %    Eos % 1.4 %    Basophil % " 0.3 %    Lymph # 1.20 0.6 - 4.6 x10(3)/mcL    Neut # 3.91 2.1 - 9.2 x10(3)/mcL    Mono # 0.68 0.1 - 1.3 x10(3)/mcL    Eos # 0.08 0 - 0.9 x10(3)/mcL    Baso # 0.02 <=0.2 x10(3)/mcL    IG# 0.03 0 - 0.04 x10(3)/mcL    IG% 0.5 %    NRBC% 0.0 %   Comprehensive metabolic panel    Collection Time: 10/29/24  5:07 AM   Result Value Ref Range    Sodium 137 136 - 145 mmol/L    Potassium 4.1 3.5 - 5.1 mmol/L    Chloride 104 98 - 107 mmol/L    CO2 23 23 - 31 mmol/L    Glucose 96 82 - 115 mg/dL    Blood Urea Nitrogen 12.3 9.8 - 20.1 mg/dL    Creatinine 0.71 0.55 - 1.02 mg/dL    Calcium 8.5 8.4 - 10.2 mg/dL    Protein Total 5.4 (L) 5.8 - 7.6 gm/dL    Albumin 2.2 (L) 3.4 - 4.8 g/dL    Globulin 3.2 2.4 - 3.5 gm/dL    Albumin/Globulin Ratio 0.7 (L) 1.1 - 2.0 ratio    Bilirubin Total 1.2 <=1.5 mg/dL     (H) 40 - 150 unit/L    ALT 40 0 - 55 unit/L    AST 42 (H) 5 - 34 unit/L    eGFR >60 mL/min/1.73/m2    Anion Gap 10.0 mEq/L    BUN/Creatinine Ratio 17    Prealbumin    Collection Time: 10/29/24  5:07 AM   Result Value Ref Range    Prealbumin 11.0 (L) 14.0 - 37.0 mg/dL   Ferritin    Collection Time: 10/29/24  5:07 AM   Result Value Ref Range    Ferritin Level 368.39 (H) 4.63 - 204.00 ng/mL   Iron and TIBC    Collection Time: 10/29/24  5:07 AM   Result Value Ref Range    Iron Binding Capacity Unsaturated 113 70 - 310 ug/dL    Iron Level 31 (L) 50 - 170 ug/dL    Transferrin 129 (L) 173 - 360 mg/dL    Iron Binding Capacity Total 144 (L) 250 - 450 ug/dL    Iron Saturation 22 20 - 50 %   CBC with Differential    Collection Time: 10/29/24  5:07 AM   Result Value Ref Range    WBC 5.81 4.50 - 11.50 x10(3)/mcL    RBC 2.89 (L) 4.20 - 5.40 x10(6)/mcL    Hgb 8.9 (L) 12.0 - 16.0 g/dL    Hct 26.5 (L) 37.0 - 47.0 %    MCV 91.7 80.0 - 94.0 fL    MCH 30.8 27.0 - 31.0 pg    MCHC 33.6 33.0 - 36.0 g/dL    RDW 14.2 11.5 - 17.0 %    Platelet 223 130 - 400 x10(3)/mcL    MPV 9.3 7.4 - 10.4 fL    Neut % 64.6 %    Lymph % 21.0 %    Mono % 10.3 %    Eos %  2.9 %    Basophil % 0.7 %    Lymph # 1.22 0.6 - 4.6 x10(3)/mcL    Neut # 3.75 2.1 - 9.2 x10(3)/mcL    Mono # 0.60 0.1 - 1.3 x10(3)/mcL    Eos # 0.17 0 - 0.9 x10(3)/mcL    Baso # 0.04 <=0.2 x10(3)/mcL    IG# 0.03 0 - 0.04 x10(3)/mcL    IG% 0.5 %    NRBC% 0.0 %     Radiology  Right femur x-ray 10/22/2024:  Impression:  Interval insertion of IM carina.  Suprapatellar effusion.  Assessment/Plan:     74 y.o. WF admitted on 10/28/2024    Right femur fracture 2/2 fall   - s/p IM stabilization on 10/22  - RLE WBAT, full ROM  - continue    Gabapentin 300 mg t.i.d.    Robaxin 500 mg every 8 hours  - follow-up with orthopedic surgery outpatient    Hyponatremia  - stable  - continue    Sodium chloride 2000 mg t.i.d.    Constipation   - Stable   - Continue     Colace 100 mg b.i.d.    Anemia  - asymptomatic  - s/p transfusion   x1 units PRBC on 10/26/2024  - H/H stable   - no evidence of active bleeds  - will closely monitor and transfuse if needed     SVT  - currently in normal sinus rhythm  - held metoprolol tartrate 12.5 mg b.i.d. 2/2 reported orthostatic hypotension  - follow-up with cardiology outpatient    HLD  - FLP outpatient  - not currently on statin    HFpEF  - EF 65% with grade 1 diastolic dysfunction  - follow-up with cardiology outpatient    VHD  - mild MR/MS/TR  - follow-up with cardiology outpatient    MEDICAL PLAN:  - Admit to [facility]  - Medical reconciliation completed and included in the electronic health record  - Draw admit labs including CBC, CMP, and Prealbumin  - Maintain weightbearing status as ordered  - Monitor postoperative surgical incision changing dressing daily  - Teach skin protection and wound prevention techniques  - Initiate fall precaution  - Initiate bowel training program  - Initiate bladder training as indicated  - Pain management  - IS q2 hours while awake    THERAPEUTIC PLAN:  - Physical therapy 60-90 minutes 5 to week to increase functional  mobility, maintain weightbearing precautions, increase lower extremity restrengthening, increase overall activity tolerance, teach balance and safety measures as well as fall precautions, make equipment and orthotic recommendations, be involved in family training and discharge planning, monitor pain levels and vital signs during therapy adjusting treatment accordingly  - Occupational therapy 60-90 minutes 5 times a week to increase functional independence with activities of daily living, maintain weightbearing precautions, teach use of adaptive equipment, increase upper extremity restrengthening, increase overall activity tolerance, teach balance and safety measures as well as fall precautions, make equipment and orthotic recommendations, be involved in family training and discharge planning, monitor pain levels and vital signs during therapy adjusting treatment accordingly  - Speech and language pathology will do an in-depth evaluation of patient's cognition, phonation, and swallowing. They will initiate therapy 30- 60 minutes 5 times a week as indicated  - Recreational therapy will incorporate all patient's functional abilities  into recreational activities that will require visual, spatial, and perceptual abilities; gross and fine motor coordination skills; the cognition to follow multistep commands; dynamic and static balance and reaching abilities. They will also incorporate animal assisted therapy into their weekly program  - Rehabilitation nursing will act as patient family physician liaison. They will integrate patient's functional abilities, after discussions with therapist, into everyday activities with the CNA's,  LPN's and RNs that will include but are not limited to dressing, bathing, feeding, grooming, toileting, transfers, hygiene etc. They will administer medications watching for wanted as well as unwanted effects. They will initiate DVT/VTE prophylaxis as ordered. Will monitor vital signs, lab  values, and pain levels, making appropriate physician notification. They will monitor skin integrity including postop incision, making daily dressing changes. They will teach skin protection and wound prevention techniques. They will initiate bowel training They will initiate bladder training as indicated. They will initiate fall precautions  - Rehabilitation counseling/case management will act as patient and family liaison. They will set up family conferences, family training, aid in discharge planning, and aid in the procurement of assistive devices  - Patient will have 24 hour availability to physiatric care  - Patient will have nutritional services involved, monitoring oral input and visceral protein stores. They will make dietary and supplement recommendations and follow-up as necessary  - Patient will have weekly interdisciplinary team conferences  - Patient will have initial family conference and follow up conferences as indicated  - Patient will have family training prior to discharge     Estimated length of stay - 14-17 days  Anticipated discharge destination - Home with   Medical status - stabilizing postoperatively; will need to monitor pain levels, postoperative skin integrity, watch for evidence of deep vein thrombosis, monitor postoperative H&H, monitor vital signs closely.  Medical prognosis - Good for post-op healing and functional improvement  Previous functional Status: Independent in ADLs, does not drive often, does chores, laundry, grocery shops, manages finances, manages own medications.  Present Functional Status:  Min to mod assist    VTE Prophylaxis:  Lovenox 40 mg b.i.d.    POA: no  Living will: no  Contacts: Brooks Fernandez (spouse) [phone]    CODE STATUS: Full  Internal Medicine (attending): Alexander Kitchen MD  Physiatry (consulting):  Isaiah Navas MD    OUTPATIENT PROVIDERS  PCP: DEX Kenny  Cardiology: Doni Barnhart MD  Orthopedic surgery: Mingo Sherman MD    DISPOSITION:  Condition stable. Tolerated transfer.  Recent labs and imaging reviewed.  Rehab admission orders initiated.  Med reconciliation completed.  Consult physiatry for rehab and pain management.  PT/OT/RT/ST to eval and treat.  Vital signs at goal.  Bowel management, sleep hygiene, and appetite at goal.    PHYSICIAN ATTESTATION: I have been involved in the patient's rehabilitation prescreening process and I have now completed the post admission physician evaluation. Patient is in need of, appropriate for, and should tolerate the above outlined inpatient rehabilitation plan. I believe this is necessary to reach maximal postoperative healing and maximal functional abilities. I do not believe this would be possible in a timely fashion in a less intensive setting      Briana nAdrade NP conducted independent physical examination and assisted with medical documentation.    Total time spent on this encounter including chart review and direct MD + NP 1-on-1 patient interaction: 101 minutes   Over 50% of this time was spent in counseling and coordination of care

## 2024-10-28 NOTE — PLAN OF CARE
Problem: Rehabilitation ([facility]) Plan of Care  Goal: Plan of Care Review  Outcome: Progressing  Goal: Patient-Specific Goal (Individualized)  Outcome: Progressing  Goal: Absence of New-Onset Illness or Injury  Outcome: Progressing  Goal: Optimal Comfort and Wellbeing  Outcome: Progressing  Goal: Home and Community Transition Plan Established  Outcome: Progressing     Problem: Wound  Goal: Optimal Coping  Outcome: Progressing  Goal: Optimal Functional Ability  Outcome: Progressing  Goal: Absence of Infection Signs and Symptoms  Outcome: Progressing  Goal: Improved Oral Intake  Outcome: Progressing  Goal: Optimal Pain Control and Function  Outcome: Progressing  Goal: Skin Health and Integrity  Outcome: Progressing  Goal: Optimal Wound Healing  Outcome: Progressing

## 2024-10-28 NOTE — CONSULTS
[facility] - Rehab Inpatient Services  Inpatient Rehab Prescreen    PATIENT INFORMATION     Assessment date/time: 10/28/24 0911    Name: Jerilyn Fernandez Phone: [phone]   Address:   [address] [place], [place] [address] [place] [address] SSN:    YOB: 1950 Age: 74 y.o. Gender: female   Race: White   Marital Status:    Advance Directives: Full code     COVERAGE INFORMATION     Patient Medicare #:  [id number]     Primary Insurance Type: MEDICARE/MEDICARE PART A & B  / Part A effective 7/1/14, Part B effective 1/1/15 Secondary Insurance Type:  /    Policy #:   Policy #:     Insurance contact name/number:  Insurance contact name/number:    Authorization #:  Authorization #:    Verified Coverage: Insurance Carrier   Pending Coverage:    Prescription Coverage:  Insurance details/comments:      PHYSICIAN/REFERRAL INFORMATION     Primary Care Physician: Tesfaye Lion PA-C Attending Physician: Alexander Kitchen MD   Consulting physician/specialist:  Referring Physician:    Referring facility:  Referring contact name/phone:    Physician details/comments:      CONTACT INFORMATION   Extended Emergency Contact Information  Primary Emergency Contact: Brooks Fernandez  Address: [address] [place] [address]  Home Phone: [phone]  Mobile Phone: [phone]  Relation: Spouse  Preferred language: English   needed? No    PRIOR LIVING SITUATION    Patient lives with  in a single story home with no steps to enter     Bedroom location/setup: single [facility]   Bathroom location/setup: walk-in shower without railing or bench, tub without railing, and normal height commode without railing   Equipment at home: none   Prior device use:  none      PRIOR LEVEL OF FUNCTION     Did a helper need to assist with the following activities prior to the current illness, exacerbation, or injury?   Self-care:  No, independent    Indoor mobility:   No, independent    Stairs: unknown    Functional Cognition: No, independent    Comments: Patient was independent for mobility and all ADLs without use of equipment    Caregivers providing assistance: Brooks Fernandez- spouse- [phone]   Pre-hospital home care service: none  Name of Agency:    Home/personal responsibilities: personal ADLs, drives but not often, cooks, cleans, does laundry, cares for dog, manages finances, and manages medications.   Pre-hospital vocational category: Retired for age   Pre-hospital vocational effort: Retired for age   Occupation/profession:  Return to work/school plan:    Educational history:    Hobbies/leisure activities:  Resources used prior to admission:    Available resources:  Resource information comments:      REHABILITATION DIAGNOSIS     History of present illness: This is a 74 year old female with a PMH of HTN, HLD, anxiety, and migraines who presented to the ED at [facility] on 10/21/24 after falling in the shower at home. Patient reported right lower extremity pain, and she also hit her head with a small bump noted to right temporal area. Denied LOC or blood thinners. Right femur XR showed displaced femoral diaphyseal fracture. Chest XR was negative for acute findings. Orthopedic surgeon consulted with recommendation for surgical intervention needed. On 10/22, patient underwent a right intramedullary stabilization of the femur shaft fracture by Dr. MELIA Sherman. Patient was placed WBAT to RLE with full range of motion of the right lower extremity. Started on Lovenox for DVT ppx. Hospital medicine was consulted for multiple runs of SVT post op, and was given Esmolol. EKG showed sinus tachycardia with T-wave abnormality, consider inferior ischemia. [facility] was consulted for evaluation and treatment of SVT. Started on Metoprolol tart 12.5mg PO BID, gave 2gm of MGSO4 x 1 dose, and checked TSH and free T4 which were both WNL. On 10/23, PT/OT evals completed with deficits noted with  recommendation for high intensity therapy needed. NSR noted to telemetry, and BP borderline low. Labs showed low Na of 130, elevated potassium of 5.2, elevated Cr of 1.08, low calcium of 8.1, low H&H of 6.8 & 20.2. Patient was transfused 2 units of PRBCs. On 10/24, labs showed low H&H of 6.4 & 18.6 post transfusion of 2 units so was transfused another 2 units of PRBCs, and Low Na of 125 so salt tabs given. BP remained low so betablocker held if SBP <100 and flomax held. Cardiology recommended patient to follow up with Dr Barnhart on discharge with no further recommendations, and signed off of case. On 10/25, labs showed low H&H of 8.3 & 23.1, low Na of 132, and low PLT of 110. Daily dressing changes started, no creams or ointments, and ok to shower on POD#7. On 10/26, labs showed low H&H of 7.5 & 21.9 so 1 unit of PRBCs given. On 10/27, labs showed low H&H of 9.2 & 27.0, low Na of 135, low calcium of 8.1. On 10/28, labs showed low H&H of 8.5 & 24.9, low Na of 133, low Ca of 8.2, elevated ALP o 293, low albumin of 2.1, elevated AST of 61, elevated CRP of 141.50. Patient will need follow up appt with Dr Sherman in 2 weeks. Okay for ASA 81mg BID on DC. Patient reported last BM on 10/20. Prior to hospitalization, patient was living with  in a single story home with no steps to enter, has a walk-in shower without bench or railing, tub without railing, and normal height commode without railing. Patient was independent for mobility and all ADLs without use of equipment, still drives but seldom, cooks, cleans, does laundry, cares for dog, manages finances, and manages medications. Patient did wear pad for bladder leakage at times. Currently, patient is AAOx4; setup assist for eating, moderate  assist for bed mobility, moderate assist or transfers with RW, walked 7ft with RW at moderate assist, max assist for lower body dressing, total assist for toileting with purwick in place, and setup assist for grooming while seated.  Pt. Requires acute inpatient rehab admission with 24-hour nursing and active physician oversight to monitor and manage acute medical comorbid conditions, labs, pain, and functional deficits.  Patient/family will also require teaching and integration of improving functional skills into daily living.  She will also require an individualized, interdisciplinary approach to her care, receiving PT, OT services 3 hours per day, 5 days per week.    Required care cannot be provided at a lower level of care. Patient is anticipated to require approximately 10-12 days LOS with expected discharge home with spouse with HH   Impairment group (IGC):   Femur (Shaft) Fracture 08.2 Etiologic diagnosis/description: Fall with acute right femur shaft fracture s/p IM carina placement    Date of onset:  10/21/24 Date of surgery: 10/22/24   Allergies: Patient has no known allergies.   Comorbid condition: Anemia, Anxiety, and Hypotension, hyponatremia, constipation   Medical/functional conditions requiring inpatient rehabilitation: This patient requires medical management/24-hour nursing of complex   comorbidities ( Fall with acute right femur shaft fracture s/p IM carina placement, Anemia, Anxiety, and Hypotension, hyponatremia, constipation), labs, medications (see medications list), pain, sleep hygiene, anticoagulation, nutrition, hydration, neurological, pulmonary, cardiac status, and preventive healthcare.      This patient requires intense therapy and an integrated,   interdisciplinary approach to address safety, impaired mobility,   impaired ADL's (dressing, toileting, grooming, showering), surgical wound care,  pulmonary insufficiency, bowel/bladder problems,   preventive healthcare, medication management, integration of   improving functional skills into daily living, and home caregiver   support and training.   Risk for medical/clinical complications: This patient is at risk for the following complications: DVT/PE, pneumonia,  "  malnutrition, neurological decline, respiratory insufficiency,   worsening activity intolerance, complications from anticoagulation,   Infection, falls,  skin breakdown, inadequate   sleep, and constipation.     SPECIAL REHABILITATION NEEDS     IV: 20 G right forearm Preferred Language: english         Peripheral IV - Single Lumen 10/21/24 1136 20 G Anterior;Right Forearm (Active)   Site Assessment Clean;Dry 10/28/24 0400   Extremity Assessment Distal to IV No abnormal discoloration 10/27/24 1600   Line Status Saline locked 10/28/24 0400   Dressing Status Clean;Dry;Intact 10/28/24 0400   Dressing Intervention Integrity maintained 10/28/24 0400          PRECAUTIONS     Safety/fall precautions: High fall risk and Weight-bearing status: Weight-bearing as tolerated: RLE     PAST MEDICAL, SOCIAL, FAMILY HISTORY     Pertinent past medical history: No past medical history on file.   Has the patient had two or more falls in the past year or any fall with injury in the past year: Yes    Has the patient had major surgery during the 100 days prior to admission: Yes    Family Medical History: No family history on file.   Substance use history:   Social History     Substance and Sexual Activity   Alcohol Use Not on file     Social History     Tobacco Use   Smoking Status Not on file   Smokeless Tobacco Not on file     Social History     Substance and Sexual Activity   Drug Use Not on file      VITALS   BP:  116/69     Temp: 98.2 °F (36.8 °C)     HR: 95     Resp: 18     SpO2: (!) 93 %     Height: 5' 2" (1.575 m)     Weight: 61.2 kg (134 lb 14.7 oz)     BMI: 24.7          MED/LABS/DIAGNOSITICS   No current facility-administered medications for this encounter.     No current outpatient medications on file.     Facility-Administered Medications Ordered in Other Encounters   Medication Dose Route Frequency Provider Last Rate Last Admin    0.9%  NaCl infusion (for blood administration)   Intravenous Q24H PRN Shell Mott, " AGACNP-BC        0.9%  NaCl infusion (for blood administration)   Intravenous Q24H PRN Eliud Rosales, FNP        0.9%  NaCl infusion (for blood administration)   Intravenous Q24H PRN Melyssa Lr PA-C        acetaminophen tablet 650 mg  650 mg Oral Once Devendra Patton MD        ALPRAZolam tablet 0.5 mg  0.5 mg Oral BID PRN Shell Mott, AGACNVICTORIA-BC   0.5 mg at 10/28/24 0309    butalbital-acetaminophen-caffeine -40 mg per tablet 1 tablet  1 tablet Oral Q6H PRN Shell Mott, AGACNP-BC        dextrose 10% bolus 125 mL 125 mL  12.5 g Intravenous PRN Taye Barron MD        dextrose 10% bolus 250 mL 250 mL  25 g Intravenous PRN Taye Barron MD        docusate sodium capsule 100 mg  100 mg Oral BID Anders Dari A, FNP   100 mg at 10/27/24 2113    electrolytes-dextrose (Pedialyte) oral solution 400 mL  400 mL Oral Q4H Eliud Rosales, FNP   400 mL at 10/28/24 0636    enoxaparin injection 40 mg  40 mg Subcutaneous Q12H Anders Dari A, FNP   40 mg at 10/28/24 0845    gabapentin capsule 300 mg  300 mg Oral TID Anders Dari A, FNP   300 mg at 10/28/24 0844    magnesium hydroxide 400 mg/5 ml suspension 2,400 mg  30 mL Oral Daily PRN Anders Dari A, FNP        melatonin tablet 6 mg  6 mg Oral Nightly PRN Anders Dari A, FNP   6 mg at 10/26/24 2104    methocarbamoL tablet 500 mg  500 mg Oral Q8H Anders Dari A, FNP   500 mg at 10/28/24 0636    morphine injection 4 mg  4 mg Intravenous Q6H PRN Inge Silva PA-C   4 mg at 10/25/24 1800    mupirocin 2 % ointment   Nasal BID Rodo, Daniel MOSLEY MD   Given at 10/27/24 2114    ondansetron injection 4 mg  4 mg Intravenous Q6H PRN Inge Silva PA-C   4 mg at 10/27/24 1343    oxyCODONE immediate release tablet 5 mg  5 mg Oral Q4H PRN Dari Mcnamara FNP   5 mg at 10/28/24 0652    oxyCODONE immediate release tablet Tab 10 mg  10 mg Oral Q4H PRN Dari Mcnamara FNP   10 mg at 10/27/24 6417    polyethylene  glycol packet 17 g  17 g Oral BID Dari Mcnamara FNP   17 g at 10/24/24 2109    sodium chloride oral tablet 2,000 mg  2,000 mg Oral TID Eliud Rosales FNP   2,000 mg at 10/28/24 0845      Pertinent lab results:   Lab Results   Component Value Date    WBC 6.73 10/28/2024    HGB 8.5 (L) 10/28/2024    HCT 24.9 (L) 10/28/2024     10/28/2024     (L) 10/28/2024    K 4.1 10/28/2024    BUN 10.6 10/28/2024    CO2 23 10/28/2024     10/28/2024      Pertinent diagnostic studies:    Additional labs and diagnostic studies required prior to admission:      QI: GG Care Tool     QI: GG Care Tool  Therapy Evaluation   Swallowing/  Nutritional Status   Diet/Feeding/  Swallowing Setup assist    Eating       Oral Hygiene   Grooming Setup assist while seated    Toileting Hygiene       Shower/Bathe   Bathing    Upper Body Dressing   Dressing Upper    Lower Body Dressing   Dressing Lower maximal assistance     Putting On/Taking Off Footwear       Toilet Transfer   Toileting total assistance ashlie    Bladder Continence Status   Bladder     Bowel Continence Status   Bowel     Roll Left and Right   Bed Mobility Mod assist to get EOB    Sit to Lying       Lying to Sitting on Side of Bed       Sit to Stand       Chair/Bed-to- Chair   Transfers   Moderate assist for sit to stand with RW   Car Transfer       Walk 10 Feet   Equipment      Walk 50 Feet with Two Turns   Balance standing lateral pelvic weight shifts x 10 trials. Decreased weight shifting to R LE    Walk 150 Feet   Endurance    Walk 10 Feet on Uneven Surfaces   Gait 7 ft RW mod assist WBAT    Picking up an Object       Wheel 50 Feet with Two Turns   Wheelchair    Wheel 150 Feet       1 Step (Curb)   Stairs    4 Steps       12 Steps       Expression of Ideas and Wants   Communication Independent    Understanding  Verbal and Non-Verbal Content       Cognitive Patterns   Cognition Independent       Safety Precautions       Lower Extremity      Strength  RLE Strength: -3/5 grossly  LLE Strength: WFL      ROM RLE ROM: WFL  LLE ROM: WFL      Upper Extremity      Strength Right Upper Extremity:   WFL     Left Upper Extremity:  WFL         ROM Right Upper Extremity:   WFL     Left Upper Extremity:  WFL        Care Score Value Definitions  6: Independent. Camanche provides no assistance with tasks. A device may or may not have been used.  5: Set-up or clean-up assistance. Camanche sets up or cleans up, but does not assist with tasks. Camanche may have assisted prior to or following the activity.  4: Supervision or touching assistance. Camanche provides verbal cues or touching/steadying or contact guard assistance. Assistance may be provided throughout the activity or intermittently.  3: Partial/moderate assistance. Camanche does less than half the effort. Camanche lifts, holds, or supports trunk or limbs, but provides less than half the effort.  2: Substantial/maximal assistance. Camanche does more than half the effort. Camanche lifts or holds trunk or limbs, and provides more than half the effort.  1: Dependent. Camanche does all of the effort, or the assistance of two or more helpers is required for the patient to complete the activity.  Care Score Activity Not Attempted Value Definitions  7: Patient refused.  9: Not applicable. Not attempted and the patient did not perform this activity prior to the current illness, exacerbation, or injury.  10: Not attempted due to environmental limitations (e.g., lack of equipment, weather constraints).  88: Not attempted due to medical condition or safety concerns.    DISCHARGE GOALS/ANTICPATED INTERVENTIONS/SERVICES     Expected Level of Improvement for Safe Discharge: Patient/caregivers anticipate discharge home at or near baseline level of functioning and/or decreased burden of care.     Patient/Family/Caregiver Goals: Patient desires to increase current level of functioning to modified independence for mobility and all ADLs so that she is able  to discharge home safely with     Required Treatments/Services: Rehabilitation Nursing, Dietitian/nutrition, Social , and Case Management   Required Therapy Therapy Type Min/Day Days/Week Duration of Therapy   Physical Therapy 90 5 10-12 days   Occupational Therapy 90 5 10-12 days    Speech and Language Pathology      Prosthetic/Orthotics      Recreational Therapy      Anticipated Services upon Discharge:  home health with therapy vs outpatient therapy    Additional rehabilitation needs:  none    Expected Discharge Destination:  home with     Barriers to Discharge: None   Discharge Support: Patient has a caregiver available, Discharge plan has been verified with patient's caregiver, and Caregiver is in agreement with the discharge plan   Patient/Family/Caregiver Orientation: Patient/family/caregiver oriented and agreeable to inpatient rehabilitation plan   Estimated Length of Stay: 10-12 days   Projected Admission Date: 10/28/24   Medical Prognosis: good   Physicians Review and Admission Determination:   I have discussed patient with Nurse Liaison. I have reviewed the prescreen. Patient is in need of, appropriate for and should tolerate and benefit from an aggressive IRF stay. Patient has functional and medical needs best addressed at this level of care.

## 2024-10-29 LAB
ALBUMIN SERPL-MCNC: 2.2 G/DL (ref 3.4–4.8)
ALBUMIN/GLOB SERPL: 0.7 RATIO (ref 1.1–2)
ALP SERPL-CCNC: 267 UNIT/L (ref 40–150)
ALT SERPL-CCNC: 40 UNIT/L (ref 0–55)
ANION GAP SERPL CALC-SCNC: 10 MEQ/L
AST SERPL-CCNC: 42 UNIT/L (ref 5–34)
BASOPHILS # BLD AUTO: 0.04 X10(3)/MCL
BASOPHILS NFR BLD AUTO: 0.7 %
BILIRUB SERPL-MCNC: 1.2 MG/DL
BUN SERPL-MCNC: 12.3 MG/DL (ref 9.8–20.1)
CALCIUM SERPL-MCNC: 8.5 MG/DL (ref 8.4–10.2)
CHLORIDE SERPL-SCNC: 104 MMOL/L (ref 98–107)
CO2 SERPL-SCNC: 23 MMOL/L (ref 23–31)
CREAT SERPL-MCNC: 0.71 MG/DL (ref 0.55–1.02)
CREAT/UREA NIT SERPL: 17
EOSINOPHIL # BLD AUTO: 0.17 X10(3)/MCL (ref 0–0.9)
EOSINOPHIL NFR BLD AUTO: 2.9 %
ERYTHROCYTE [DISTWIDTH] IN BLOOD BY AUTOMATED COUNT: 14.2 % (ref 11.5–17)
FERRITIN SERPL-MCNC: 368.39 NG/ML (ref 4.63–204)
GFR SERPLBLD CREATININE-BSD FMLA CKD-EPI: >60 ML/MIN/1.73/M2
GLOBULIN SER-MCNC: 3.2 GM/DL (ref 2.4–3.5)
GLUCOSE SERPL-MCNC: 96 MG/DL (ref 82–115)
HCT VFR BLD AUTO: 26.5 % (ref 37–47)
HGB BLD-MCNC: 8.9 G/DL (ref 12–16)
IMM GRANULOCYTES # BLD AUTO: 0.03 X10(3)/MCL (ref 0–0.04)
IMM GRANULOCYTES NFR BLD AUTO: 0.5 %
IRON SATN MFR SERPL: 22 % (ref 20–50)
IRON SERPL-MCNC: 31 UG/DL (ref 50–170)
LYMPHOCYTES # BLD AUTO: 1.22 X10(3)/MCL (ref 0.6–4.6)
LYMPHOCYTES NFR BLD AUTO: 21 %
MCH RBC QN AUTO: 30.8 PG (ref 27–31)
MCHC RBC AUTO-ENTMCNC: 33.6 G/DL (ref 33–36)
MCV RBC AUTO: 91.7 FL (ref 80–94)
MONOCYTES # BLD AUTO: 0.6 X10(3)/MCL (ref 0.1–1.3)
MONOCYTES NFR BLD AUTO: 10.3 %
NEUTROPHILS # BLD AUTO: 3.75 X10(3)/MCL (ref 2.1–9.2)
NEUTROPHILS NFR BLD AUTO: 64.6 %
NRBC BLD AUTO-RTO: 0 %
PLATELET # BLD AUTO: 223 X10(3)/MCL (ref 130–400)
PMV BLD AUTO: 9.3 FL (ref 7.4–10.4)
POTASSIUM SERPL-SCNC: 4.1 MMOL/L (ref 3.5–5.1)
PREALB SERPL-MCNC: 11 MG/DL (ref 14–37)
PROT SERPL-MCNC: 5.4 GM/DL (ref 5.8–7.6)
RBC # BLD AUTO: 2.89 X10(6)/MCL (ref 4.2–5.4)
SODIUM SERPL-SCNC: 137 MMOL/L (ref 136–145)
TIBC SERPL-MCNC: 113 UG/DL (ref 70–310)
TIBC SERPL-MCNC: 144 UG/DL (ref 250–450)
TRANSFERRIN SERPL-MCNC: 129 MG/DL (ref 173–360)
WBC # BLD AUTO: 5.81 X10(3)/MCL (ref 4.5–11.5)

## 2024-10-29 PROCEDURE — 94640 AIRWAY INHALATION TREATMENT: CPT

## 2024-10-29 PROCEDURE — 82728 ASSAY OF FERRITIN: CPT | Performed by: NURSE PRACTITIONER

## 2024-10-29 PROCEDURE — 97530 THERAPEUTIC ACTIVITIES: CPT

## 2024-10-29 PROCEDURE — 97166 OT EVAL MOD COMPLEX 45 MIN: CPT

## 2024-10-29 PROCEDURE — 97110 THERAPEUTIC EXERCISES: CPT

## 2024-10-29 PROCEDURE — 97535 SELF CARE MNGMENT TRAINING: CPT

## 2024-10-29 PROCEDURE — 85025 COMPLETE CBC W/AUTO DIFF WBC: CPT | Performed by: NURSE PRACTITIONER

## 2024-10-29 PROCEDURE — 25000003 PHARM REV CODE 250: Performed by: NURSE PRACTITIONER

## 2024-10-29 PROCEDURE — 99900031 HC PATIENT EDUCATION (STAT)

## 2024-10-29 PROCEDURE — 11800000 HC REHAB PRIVATE ROOM

## 2024-10-29 PROCEDURE — 36415 COLL VENOUS BLD VENIPUNCTURE: CPT | Performed by: NURSE PRACTITIONER

## 2024-10-29 PROCEDURE — 63600175 PHARM REV CODE 636 W HCPCS: Performed by: NURSE PRACTITIONER

## 2024-10-29 PROCEDURE — 83540 ASSAY OF IRON: CPT | Performed by: NURSE PRACTITIONER

## 2024-10-29 PROCEDURE — 94799 UNLISTED PULMONARY SVC/PX: CPT

## 2024-10-29 PROCEDURE — 80053 COMPREHEN METABOLIC PANEL: CPT | Performed by: NURSE PRACTITIONER

## 2024-10-29 PROCEDURE — 84134 ASSAY OF PREALBUMIN: CPT | Performed by: NURSE PRACTITIONER

## 2024-10-29 PROCEDURE — 94761 N-INVAS EAR/PLS OXIMETRY MLT: CPT

## 2024-10-29 PROCEDURE — 97162 PT EVAL MOD COMPLEX 30 MIN: CPT

## 2024-10-29 PROCEDURE — 99223 1ST HOSP IP/OBS HIGH 75: CPT | Mod: ,,, | Performed by: NURSE PRACTITIONER

## 2024-10-29 RX ORDER — GABAPENTIN 300 MG/1
300 CAPSULE ORAL
Status: DISCONTINUED | OUTPATIENT
Start: 2024-10-30 | End: 2024-11-07 | Stop reason: HOSPADM

## 2024-10-29 RX ADMIN — METHOCARBAMOL 500 MG: 500 TABLET ORAL at 05:10

## 2024-10-29 RX ADMIN — SODIUM CHLORIDE 2000 MG: 1 TABLET ORAL at 08:10

## 2024-10-29 RX ADMIN — GABAPENTIN 300 MG: 300 CAPSULE ORAL at 01:10

## 2024-10-29 RX ADMIN — POLYVINYL ALCOHOL, POVIDONE 1 DROP: 14; 6 SOLUTION/ DROPS OPHTHALMIC at 01:10

## 2024-10-29 RX ADMIN — METHOCARBAMOL 500 MG: 500 TABLET ORAL at 01:10

## 2024-10-29 RX ADMIN — OXYCODONE HYDROCHLORIDE 5 MG: 5 TABLET ORAL at 12:10

## 2024-10-29 RX ADMIN — SODIUM CHLORIDE 2000 MG: 1 TABLET ORAL at 05:10

## 2024-10-29 RX ADMIN — HYDROXYZINE PAMOATE 50 MG: 50 CAPSULE ORAL at 08:10

## 2024-10-29 RX ADMIN — METHOCARBAMOL 500 MG: 500 TABLET ORAL at 08:10

## 2024-10-29 RX ADMIN — ENOXAPARIN SODIUM 40 MG: 40 INJECTION SUBCUTANEOUS at 08:10

## 2024-10-29 RX ADMIN — OXYCODONE HYDROCHLORIDE 5 MG: 5 TABLET ORAL at 07:10

## 2024-10-29 RX ADMIN — DOCUSATE SODIUM 100 MG: 100 CAPSULE, LIQUID FILLED ORAL at 07:10

## 2024-10-29 RX ADMIN — GABAPENTIN 300 MG: 300 CAPSULE ORAL at 05:10

## 2024-10-29 RX ADMIN — ACETAMINOPHEN 650 MG: 325 TABLET ORAL at 09:10

## 2024-10-29 RX ADMIN — SODIUM CHLORIDE 2000 MG: 1 TABLET ORAL at 01:10

## 2024-10-29 RX ADMIN — ENOXAPARIN SODIUM 40 MG: 40 INJECTION SUBCUTANEOUS at 07:10

## 2024-10-29 RX ADMIN — OXYCODONE HYDROCHLORIDE 5 MG: 5 TABLET ORAL at 06:10

## 2024-10-29 NOTE — PT/OT/SLP EVAL
"Physical Therapy Rehab Evaluation    Patient Name:  Jerilyn Fernandez   MRN:  [id number]    Recommendations:     Discharge Recommendations:  Low Intensity Therapy   Discharge Equipment Recommendations: walker, rolling   Barriers to discharge: Increased level of assist, Ongoing medical treatment, Impaired functional mobility , and Severity of Deficits     Assessment:     Jerilyn Fernandez is a 74 y.o. female admitted with a medical diagnosis of Closed fracture of right femur.  She presents with the following impairments/functional limitations:  weakness, impaired endurance, impaired functional mobility, gait instability, impaired balance, impaired cognition, decreased lower extremity function, pain, decreased ROM, edema, orthopedic precautions.    Rehab Diagnosis: Fall in shower, right femoral shaft fracture s/p IM rodding 10/22/2024. Pt. Has a history of HTN, HLD, anxiety, and migraines.    General Precautions: Standard, fall     Orthopedic Precautions: RLE weight bearing as tolerated (ROMAT)     Braces: N/A    Rehab Prognosis: Good; patient would benefit from acute skilled PT services to address these deficits and reach maximum level of function.      History:     No past medical history on file.    Past Surgical History:   Procedure Laterality Date    RETROGRADE INTRAMEDULLARY RODDING OF DISTAL FEMUR Right 10/22/2024    Procedure: INSERTION, INTRAMEDULLARY BHARGAVI, FEMUR, DISTAL, RETROGRADE;  Surgeon: Mingo Sherman MD;  [facility];  Service: Orthopedics;  Laterality: Right;  Supine, flat brigido, traction, triangles  Synthes RFNA  please make second case       Subjective     Patient Goal: "to get stronger"    Patient Comments: "My leg hurts and it's really swollen"    Patients cultural, spiritual, Latter-day conflicts given the current situation: no       Living Environment  People in Home: spouse  Name(s) of People in Home: Brooks  Living Arrangements: house  Number of Stairs, Main Entrance: none  Home Layout: Able to " live on 1st floor  Transportation Anticipated: family or friend will provide  Equipment Currently Used at Home: none    Prior Level of Function  Ambulation Skills: independent  Stairs: other (see comments) (Not performed prior)  Transfer Skills: independent    Equipment used at home: none.  DME owned (not currently used): none.      Upon discharge, patient will have assistance from her , Brooks.    Objective:     Communicated with ALESSIO Aguilar prior to session.  Patient found up in chair with peripheral IV  upon PT entry to room.    Vitals   Vitals at Rest  /78   HR 87   O2 Sat     Pain Location - Side 1: Right  Location 1: hip  Pain Addressed 1: Distraction, Cessation of Activity, Nurse notified, Reposition       Respiratory Status: Room air    Exams  Skin Integrity/Edema:     -       R knee/thigh significantly swollen  RLE ROM:         - Limited by pain and swelling   RLE Strength:  - Limited by pain and swelling, 2/5 overall          LLE ROM:          -       WFL  LLE Strength:          -       WFL      Functional Mobility:    GGs   Admit Current   Status Goal   Functional Area: Care Score: Care Score: Care Score:   Roll Left and Right 4 4  SBA, HOB flat and no rails, painful Independent   Sit to Lying 3 3  Min A for RLE, HOB flat, performed on both L and R sides of bed Independent   Lying to Sitting on Side of Bed 3 3  Min A for RLE, HOB flat, performed on both L and R sides of bed Independent   Sit to Stand 3 3  Min A with RW Independent   Chair/Bed-to-Chair Transfer 3 3  Min A with RW, stand step/pivot Independent   Car Transfer 88 88 Independent   Walk 10 Feet 3 3  Min A with RW, 15ft. Antalgic, step-to, limited by pain. Independent   Walk 50 Feet with Two Turns 88 88 Independent   Walk 150 Feet 88 88 Supervision or touching assistance   Walk 10 Feet Uneven Surface 88 88 Supervision or touching assistance   1 Step (Curb) 9 9 Supervision or touching assistance   4 Steps 9 9 Partial/moderate assistance    12 Steps 9 9 Not applicable   Picking Up Object 88 88 Set-up/clean-up   Wheel 50 Feet with Two Turns 9 9 Not applicable   Wheel 150 Feet 9 9 Not applicable     Therapeutic Activities and Exercises:  Patient educated on role of IRF PT and PT POC, safety while in hospital including calling nurse for mobility, and call light usage.      Activity Tolerance: Good and Fair    Patient left up in chair with call button in reach and [facility] NSG notified.    Education Provided: roles and goals of PT/PTA, transfer training, bed mob, gait training, safety awareness, body mechanics, assistive device, and fall prevention    Expected compliance: Moderate compliance      Plan:     During this hospitalization, patient to be seen 5 x/week (5-7 x/week) to address the identified rehab impairments via gait training, therapeutic activities, therapeutic exercises, neuromuscular re-education, wheelchair management/training and progress toward the following goals:    GOALS:   Multidisciplinary Problems       Physical Therapy Goals          Problem: Physical Therapy    Goal Priority Disciplines Outcome Interventions   Physical Therapy Goal     PT, PT/OT Progressing    Description: Bed Mobility:  Roll left and right with setup/clean-up assist.   Sit to supine transfer with supervision/touching assist.   Supine to sit transfer with supervision/touching assist.     Transfers:  Sit to stand transfer with supervision/touching assist using RW.   Bed to chair transfer with supervision/touching assist Stand Step  using RW.   Car transfer with partial/moderate assist using RW.    an object from the ground in standing position with partial/moderate assist using RW.     Mobility:  Ambulate 75 feet with partial/moderate assist using RW.                         Plan of Care Expires:  11/15/24  PT Next Visit Date: 11/04/24  Plan of Care reviewed with: patient      Additional Infomation:           Time Tracking:     Therapy Time   PT Start Time:  1100  PT Stop Time: 1200  PT Total Time (min): 60 min  PT Individual: 60  Missed Time:    Time Missed due to:      Billable Minutes: Evaluation 20 and Therapeutic Activity 40    10/29/2024

## 2024-10-29 NOTE — PLAN OF CARE
Problem: Physical Therapy  Goal: Physical Therapy Goal  Description: Bed Mobility:  Roll left and right with setup/clean-up assist.   Sit to supine transfer with supervision/touching assist.   Supine to sit transfer with supervision/touching assist.     Transfers:  Sit to stand transfer with supervision/touching assist using RW.   Bed to chair transfer with supervision/touching assist Stand Step  using RW.   Car transfer with partial/moderate assist using RW.    an object from the ground in standing position with partial/moderate assist using RW.     Mobility:  Ambulate 75 feet with partial/moderate assist using RW.    Outcome: Initial

## 2024-10-29 NOTE — PROGRESS NOTES
"   10/29/24 1300   Rec Therapy Time Calculation   Date of Treatment 10/29/24   Rec Start Time 1300   Rec Stop Time 1330   Rec Total Time (min) 30 min   Time   Treatment time 2 units   Charges   $Therapeutic Exercise 2 units   Precautions   General Precautions fall   Orthopedic Precautions  RLE weight bearing as tolerated   Braces N/A   Pain/Comfort   Pain Rating 1 no pain   OTHER   Treatment Diagnosis Fall in shower, R femoral shaft fx, IMrodding, HTN, anxiety, migraines   Rehab identified problem list/impairments weakness;impaired endurance;impaired functional mobility;gait instability;decreased lower extremity function;decreased safety awareness   Values/Beliefs/Spiritual Care   Spiritual, Cultural Beliefs, Voodoo Practices, Values that Affect Care no   Prior Living/ADLs   Admit Date 10/28/24   People in Home spouse   Living Arrangements house   Patient responsibilites: ;Financial management;Health and wellness;Laundry;Leisure/play/hobbies;Meal preparation;Retired;Shopping   Number of Children 0   Occupation Retired: [employer]   Previous Hand Domiance Right   Current Hand Dominance Right   Overall Level of Functioning   Activity Tolerance Independent   Dynamic Sitting Balance/Reaching Mod Indep   Dynamic Standing Balance/Reaching Min A   Right UE Coodination/Dexterity Independent   Left UE Coordination/Dexterity Independent   Problem Solving/Sequencing Skills Independent   Memory Recall Independent   R/L Neglect/Inattention Does not occur   Attention Span Independent   Social Interaction Independent   Patient Goals   Patient Goal 1 "Get stronger and move stronger."   Recreational Therapy Short Term Goals   Short Term Goal 1 Will increase sit to stand to supervision   Short Term Goal 1 Progression Initiated   Short Term Goal 2 Will improve dynamic standing balance/reaching to supervision   Short Term Goal 2 Progression Initiated   Recreational Therapy Long Term Goals   Long Term Goal 1 Will increase " standing tolerance to 5,10 minutes   Long Term Goal 1 Progression Initiated   Long Term Goal 2 Will improve dynamic standing balance/reaching to setup/I   Long Term Goal 2 Progression Initiated   Plan   Patient to be seen Daily   Planned Duration 2 weeks   Treatments Planned Energy conservation training   Treatment plan/goals estblished with Patient/Caregiver Yes

## 2024-10-29 NOTE — PROGRESS NOTES
[facility] ([facility])  Rehab Progress Note    Patient Name: Jerilyn Fernandez  MRN: [id number]  Age: 74 y.o. Sex: female  : 1950  Hospital Length of Stay: 1 days  Date of Service: 10/29/2024   Chief Complaint: Right femur fracture 2/2 fall s/p IM stabilization on 10/22     Subjective:     Basic Information  Admit Information: 74-year-old WF presented to [facility] on 10/21 via EMS with complaints of right-sided hip pain after slipping and falling at home.  PMH significant for hypertension and hyperlipidemia.  Workup significant for right femur fracture.  On 10/22 tolerated IM stabilization without perioperative complications.  Postoperatively reported SVT requiring esmolol then transition to normal sinus rhythm.  Initiated metoprolol tartrate 12.5 mg b.i.d. per Cardiology.  Continued with orthostatic hypotension and trauma surgery held beta-blockers and Flomax.  Required 1 unit PRBC transfusion on 10/26. Tolerated transfer to [facility] inpatient rehab unit on 10/28 without incident.   Today's Information: No acute events overnight.  Sitting on side of bed comfortably working with therapy.  Reports anxiety symptoms at times.  Reports good sleep and appetite.  Last BM 10/28.  Vital signs at goal with no recent recorded fevers.  Orthostatic hypotension resolved once sitting this morning.  Labs reviewed, H&H stable, albumin and pre-albumin stable, otherwise unremarkable.  CMP unremarkable.  No imaging today.    Review of patient's allergies indicates:  No Known Allergies     Current Facility-Administered Medications:     acetaminophen tablet 650 mg, 650 mg, Oral, Q6H PRN, Briana Andrade, ELAYNEP    benzonatate capsule 100 mg, 100 mg, Oral, TID PRN, Briana Andrade, FNP    docusate sodium capsule 100 mg, 100 mg, Oral, BID, Briana Andrade, FNP, 100 mg at 10/29/24 0743    enoxaparin injection 40 mg, 40 mg, Subcutaneous, Q12H, Briana Andrade, ELAYNEP, 40 mg at 10/29/24 0743    gabapentin capsule 300  "mg, 300 mg, Oral, TID, Briana Andrade, FNP, 300 mg at 10/29/24 0521    hydrALAZINE injection 10 mg, 10 mg, Intravenous, Q4H PRN, Briana Andrade, FNP    HYDROcodone-acetaminophen 5-325 mg per tablet 1 tablet, 1 tablet, Oral, Daily PRN, Briana Andrade B, FNP    hydrOXYzine pamoate capsule 50 mg, 50 mg, Oral, Nightly PRN, Briana Andrade, FNP, 50 mg at 10/28/24 2331    labetalol 20 mg/4 mL (5 mg/mL) IV syring, 10 mg, Intravenous, Q4H PRN, Briana Andrade, FNP    methocarbamoL tablet 500 mg, 500 mg, Oral, Q8H, Briana Andrade B, FNP, 500 mg at 10/29/24 0521    metoprolol injection 10 mg, 10 mg, Intravenous, Q2H PRN, Briana Andrade, FNP    nitroGLYCERIN SL tablet 0.4 mg, 0.4 mg, Sublingual, Q5 Min PRN, Briana Andrade B, FNP    ondansetron disintegrating tablet 4 mg, 4 mg, Oral, Q6H PRN, Briana Andrade, FNP    ondansetron injection 4 mg, 4 mg, Intravenous, Q8H PRN, Briana Andrade B, FNP    oxyCODONE immediate release tablet 5 mg, 5 mg, Oral, Q4H PRN, Briana Andrade, FNP, 5 mg at 10/29/24 0743    polyethylene glycol packet 17 g, 17 g, Oral, Q12H PRN, Briana Andrade B, FNP    sodium chloride oral tablet 2,000 mg, 2,000 mg, Oral, TID, Briana Andrade B, FNP, 2,000 mg at 10/29/24 0521     Review of Systems   Complete 12-point review of symptoms negative except for what's mentioned in HPI     Objective:     /75   Pulse 93   Temp 98.3 °F (36.8 °C) (Oral)   Resp 18   Ht 5' 2" (1.575 m)   Wt 65 kg (143 lb 4.8 oz)   SpO2 (!) 94%   BMI 26.21 kg/m²      Physical Exam  Vitals reviewed.   Eyes:      Pupils: Pupils are equal, round, and reactive to light.   Cardiovascular:      Rate and Rhythm: Normal rate and regular rhythm.      Heart sounds: Normal heart sounds.   Pulmonary:      Effort: Pulmonary effort is normal.      Breath sounds: Normal breath sounds.   Abdominal:      General: Bowel sounds are normal.   Musculoskeletal:         General: Normal range of motion.   Skin:     General: Skin is " warm.      Comments: Right lower extremity incision dry and intact    Neurological:      General: No focal deficit present.      Mental Status: She is alert and oriented to person, place, and time.      Motor: Weakness present.   Psychiatric:         Mood and Affect: Mood normal.     *MD performed and documented physical examination       Lines/Drains/Airways       Peripheral Intravenous Line  Duration                  Peripheral IV - Single Lumen 10/21/24 1136 20 G Anterior;Right Forearm 7 days                    Labs  Admission on 10/28/2024   Component Date Value Ref Range Status    Sodium 10/29/2024 137  136 - 145 mmol/L Final    Potassium 10/29/2024 4.1  3.5 - 5.1 mmol/L Final    Chloride 10/29/2024 104  98 - 107 mmol/L Final    CO2 10/29/2024 23  23 - 31 mmol/L Final    Glucose 10/29/2024 96  82 - 115 mg/dL Final    Blood Urea Nitrogen 10/29/2024 12.3  9.8 - 20.1 mg/dL Final    Creatinine 10/29/2024 0.71  0.55 - 1.02 mg/dL Final    Calcium 10/29/2024 8.5  8.4 - 10.2 mg/dL Final    Protein Total 10/29/2024 5.4 (L)  5.8 - 7.6 gm/dL Final    Albumin 10/29/2024 2.2 (L)  3.4 - 4.8 g/dL Final    Globulin 10/29/2024 3.2  2.4 - 3.5 gm/dL Final    Albumin/Globulin Ratio 10/29/2024 0.7 (L)  1.1 - 2.0 ratio Final    Bilirubin Total 10/29/2024 1.2  <=1.5 mg/dL Final    ALP 10/29/2024 267 (H)  40 - 150 unit/L Final    ALT 10/29/2024 40  0 - 55 unit/L Final    AST 10/29/2024 42 (H)  5 - 34 unit/L Final    eGFR 10/29/2024 >60  mL/min/1.73/m2 Final    Anion Gap 10/29/2024 10.0  mEq/L Final    BUN/Creatinine Ratio 10/29/2024 17   Final    Prealbumin 10/29/2024 11.0 (L)  14.0 - 37.0 mg/dL Final    Ferritin Level 10/29/2024 368.39 (H)  4.63 - 204.00 ng/mL Final    Iron Binding Capacity Unsaturated 10/29/2024 113  70 - 310 ug/dL Final    Iron Level 10/29/2024 31 (L)  50 - 170 ug/dL Final    Transferrin 10/29/2024 129 (L)  173 - 360 mg/dL Final    Iron Binding Capacity Total 10/29/2024 144 (L)  250 - 450 ug/dL Final    Iron  Saturation 10/29/2024 22  20 - 50 % Final    WBC 10/29/2024 5.81  4.50 - 11.50 x10(3)/mcL Final    RBC 10/29/2024 2.89 (L)  4.20 - 5.40 x10(6)/mcL Final    Hgb 10/29/2024 8.9 (L)  12.0 - 16.0 g/dL Final    Hct 10/29/2024 26.5 (L)  37.0 - 47.0 % Final    MCV 10/29/2024 91.7  80.0 - 94.0 fL Final    MCH 10/29/2024 30.8  27.0 - 31.0 pg Final    MCHC 10/29/2024 33.6  33.0 - 36.0 g/dL Final    RDW 10/29/2024 14.2  11.5 - 17.0 % Final    Platelet 10/29/2024 223  130 - 400 x10(3)/mcL Final    MPV 10/29/2024 9.3  7.4 - 10.4 fL Final    Neut % 10/29/2024 64.6  % Final    Lymph % 10/29/2024 21.0  % Final    Mono % 10/29/2024 10.3  % Final    Eos % 10/29/2024 2.9  % Final    Basophil % 10/29/2024 0.7  % Final    Lymph # 10/29/2024 1.22  0.6 - 4.6 x10(3)/mcL Final    Neut # 10/29/2024 3.75  2.1 - 9.2 x10(3)/mcL Final    Mono # 10/29/2024 0.60  0.1 - 1.3 x10(3)/mcL Final    Eos # 10/29/2024 0.17  0 - 0.9 x10(3)/mcL Final    Baso # 10/29/2024 0.04  <=0.2 x10(3)/mcL Final    IG# 10/29/2024 0.03  0 - 0.04 x10(3)/mcL Final    IG% 10/29/2024 0.5  % Final    NRBC% 10/29/2024 0.0  % Final     Radiology  Right femur x-ray 10/22/2024:  Impression:  Interval insertion of IM carina.  Suprapatellar effusion.    Assessment/Plan:     74 y.o. WF admitted on 10/28/2024     Right femur fracture 2/2 fall   - s/p IM stabilization on 10/22  - RLE WBAT, full ROM  - continue                Gabapentin 300 mg t.i.d.                Robaxin 500 mg every 8 hours  - follow-up with orthopedic surgery outpatient     Hyponatremia  - stable  - continue                Sodium chloride 2000 mg t.i.d.     Constipation   - Stable   - Continue                 Colace 100 mg b.i.d.     Anemia  - asymptomatic  - s/p transfusion                x1 units PRBC on 10/26/2024  - H/H stable   - no evidence of active bleeds  - will closely monitor and transfuse if needed      SVT  - currently in normal sinus rhythm  - held metoprolol tartrate 12.5 mg b.i.d. 2/2 reported  orthostatic hypotension  - follow-up with cardiology outpatient     HLD  - FLP outpatient  - not currently on statin     HFpEF  - EF 65% with grade 1 diastolic dysfunction  - follow-up with cardiology outpatient     VHD  - mild MR/MS/TR  - follow-up with cardiology outpatient     Major depressive disorder  - current  - continue   BuSpar 5 mg t.i.d. (initiated 10/29)     VTE Prophylaxis:  Lovenox 40 mg b.i.d.     POA: no  Living will: no  Contacts: Brooks Fernandez (spouse) [phone]     CODE STATUS: Full  Internal Medicine (attending): Alexander Kitchen MD  Physiatry (consulting):  Isaiah Navas MD     OUTPATIENT PROVIDERS  PCP: DEX Kenny  Cardiology: Doni Barnhart MD  Orthopedic surgery: Mingo Sherman MD     DISPOSITION:  Vital signs at goal.  Labs reviewed.  H&H stable.  CMP unremarkable.  Albumin and pre-albumin stable.  Reported orthostatic hypotension resolved once sitting.  Initiate BuSpar 5 mg t.i.d. 2/2 reported anxiety symptoms.  Bowel management, sleep hygiene, and appetite at goal.  Continues to progress well with therapies.  Monitor closely.  Notify of any acute changes.  Obtain right lower extremity venous ultrasound to rule out DVT 2/2 right lower extremity edema.     Briana Andrade NP conducted independent physical examination and assisted with medical documentation.     Total time spent on this encounter including chart review and direct MD + NP 1-on-1 patient interaction: 51 minutes   Over 50% of this time was spent in counseling and coordination of care

## 2024-10-29 NOTE — PLAN OF CARE
10/29/24 0829   Depression Screen (Over the Past Two Weeks)   Have You Felt Down, Depressed or Hopeless? no   Have You Felt Little Interest or Pleasure in Doing Things? no     Admission PHQ

## 2024-10-29 NOTE — PLAN OF CARE
Problem: Occupational Therapy  Goal: Occupational Therapy Goal  Description:     ADLs:  Pt to perform grooming tasks with SBA standing at BS sink with LRAD  Pt to perform feeding tasks with independence   Pt to perform UB dressing with setup assist   Pt to perform LB dressing with mod A using AE as needed   Pt to perform putting on/off footwear task with max A using AE as needed  Pt to perform toileting with SBA  Pt to perform bathing with mod A using AE as needed    Functional Transfers:  Pt to perform toilet transfers with SBA and BSC over toilet   Pt to perform a tub transfer with CGA and TTB   Pt to perform a walk-in shower transfer with CGA     IADLs:  Pt to perform simple meal prep and light housekeeping with independence and LRAD   Outcome: Progressing

## 2024-10-29 NOTE — PLAN OF CARE
Problem: Rehabilitation ([facility]) Plan of Care  Goal: Absence of New-Onset Illness or Injury  Outcome: Progressing  Intervention: Prevent Fall and Fall Injury  Flowsheets (Taken 10/29/2024 1505)  Safety Promotion/Fall Prevention:   assistive device/personal item within reach   gait belt with ambulation   nonskid shoes/socks when out of bed   instructed to call staff for mobility  Goal: Optimal Comfort and Wellbeing  Outcome: Progressing  Intervention: Provide Person-Centered Care  Flowsheets (Taken 10/29/2024 1505)  Trust Relationship/Rapport:   care explained   choices provided     Problem: Wound  Goal: Optimal Pain Control and Function  Outcome: Progressing  Intervention: Prevent or Manage Pain  Flowsheets (Taken 10/29/2024 1505)  Sleep/Rest Enhancement: consistent schedule promoted  Pain Management Interventions:   around-the-clock dosing utilized   care clustered   pillow support provided  Goal: Optimal Wound Healing  Outcome: Progressing  Intervention: Promote Wound Healing  Flowsheets (Taken 10/29/2024 1505)  Sleep/Rest Enhancement: consistent schedule promoted     Problem: Fall Injury Risk  Goal: Absence of Fall and Fall-Related Injury  Outcome: Progressing  Intervention: Identify and Manage Contributors  Flowsheets (Taken 10/29/2024 1505)  Medication Review/Management: medications reviewed  Intervention: Promote Injury-Free Environment  Flowsheets (Taken 10/29/2024 1505)  Safety Promotion/Fall Prevention:   assistive device/personal item within reach   gait belt with ambulation   nonskid shoes/socks when out of bed   instructed to call staff for mobility

## 2024-10-29 NOTE — PT/OT/SLP EVAL
Recreational Therapy Evaluation      Date of Treatment: 10/29/24  Start Time: 1300  Stop Time: 1330  Total Time: 30 min  Missed Time:      Assessment      Jerilyn Fernandez is a 74 y.o. female admitted with a medical diagnosis of Closed fracture of right femur.  She presents with the following impairments/functional limitations:  weakness, impaired endurance, impaired functional mobility, gait instability, decreased lower extremity function, decreased safety awareness .    Rehab Diagnosis:     Recent Surgery:     General Precautions: Standard, fall     Orthopedic Precautions:RLE weight bearing as tolerated     Braces: N/A    Rehab Prognosis: Good; patient would benefit from acute skilled Recreational Therapy services to address these deficits and reach maximum level of function.      Impairments: Endurance deficits, Mobility deficits, and Strength deficits  Rehab Potential: Good  Treatment Recommendations: Continue with Skill TR Service to facilitate functional independence and address impairments/limitations   Treatment Diagnosis: Fall in shower, R femoral shaft fx, IMrodding, HTN, anxiety, migraines  Orientation: Oriented x4  Affect/Behavior: Appropriate, Cooperative, and Anxious  Safety/Judgement: intact   Basic Command Following: intact  Spiritual Cultural: no        History     No past medical history on file.    Past Surgical History:   Procedure Laterality Date    RETROGRADE INTRAMEDULLARY RODDING OF DISTAL FEMUR Right 10/22/2024    Procedure: INSERTION, INTRAMEDULLARY BHARGAVI, FEMUR, DISTAL, RETROGRADE;  Surgeon: Mingo Sherman MD;  Location: [facility];  Service: Orthopedics;  Laterality: Right;  Supine, flat brigido, traction, triangles  Synthes RFNA  please make second case       Home Environment     Admit Date: 10/28/24  Living Situation  People in Home: spouse  Name(s) of People in Home: Brooks  Lives in: house  Patients Responsibilities: , Financial management, Health and wellness, Laundry,  "Leisure/play/hobbies, Meal preparation, Retired, Shopping  Number of Children: 0  Occupation:Retired: [employer]    Instrumental Activities of Daily Living     Previous Hand Dominance: Right Current Hand Dominance: Right     Other iADL Information:        Cognitive Skills Building         Cognitive Observation Activity Assist Position Equipment Response            Comment:      Dynamic Activities      Activity Assist Position Equipment Response   Activity 1 Washer toss contact guard assistance Standing Rolling walker and Metal washers good   Comment: Recliner to w/c transfer was contact guard.  Sit to stand was contact guard as was dynamic standing balance/reaching.  Standing tolerance was 5 minutes with sitting rest breaks.  UE coordination was I as were sequencing and problem solving skills. She was anxious but cooperative       Fine Motor Activities      Activity Assist Position Equipment Response           Comment:        Goals     Patient Goals  Patient Goal 1: "Get stronger and move stronger."    Short Term Goals    Goal  Goal Status   Will increase sit to stand to supervision Initiated   Will improve dynamic standing balance/reaching to supervision Initiated                 Long Term Goals    Goal Goal Status   Will increase standing tolerance to 5,10 minutes Initiated   Will improve dynamic standing balance/reaching to setup/I Initiated                     Plan       Patient to be seen: Daily  Duration: 2 weeks  Treatments planned: Energy conservation training  Treatment plan/goals established with Patient/Caregiver: Yes     "

## 2024-10-29 NOTE — PT/OT/SLP PROGRESS
"Physical Therapy Inpatient Rehab Treatment    Patient Name:  Jerilyn Fernandez   MRN:  [id number]    Recommendations:     Discharge Recommendations:  Low Intensity Therapy   Discharge Equipment Recommendations:     Barriers to discharge: Ongoing medical treatment, Impaired functional mobility , and Severity of Deficits     Assessment:     Jerilyn Fernandez is a 74 y.o. female admitted with a medical diagnosis of Closed fracture of right femur.  She presents with the following impairments/functional limitations:  weakness, impaired endurance, impaired functional mobility, gait instability, impaired balance, impaired cognition, decreased lower extremity function, pain, decreased ROM, edema, orthopedic precautions.    Rehab Diagnosis: Fall in shower, right femoral shaft fracture s/p IM rodding 10/22/2024. Pt. Has a history of HTN, HLD, anxiety, and migraines.    General Precautions: Standard, fall     Orthopedic Precautions:RLE weight bearing as tolerated (ROMAT)     Braces: N/A    Rehab Prognosis: Good; patient would benefit from acute skilled PT services to address these deficits and reach maximum level of function.      History:     No past medical history on file.    Past Surgical History:   Procedure Laterality Date    RETROGRADE INTRAMEDULLARY RODDING OF DISTAL FEMUR Right 10/22/2024    Procedure: INSERTION, INTRAMEDULLARY BHARGAVI, FEMUR, DISTAL, RETROGRADE;  Surgeon: Minog Sherman MD;  Location: [facility];  Service: Orthopedics;  Laterality: Right;  Supine, flat brigido, traction, triangles  Synthes RFNA  please make second case       Subjective     Patient comments: "I'm tired"    Respiratory Status: Room air    Patients cultural, spiritual, Mosque conflicts given the current situation: no    Objective:     Communicated with GLENIS Padron prior to session.  Patient found up in chair with peripheral IV  upon PT entry to room.    Pt is Anxious, Alert, Cooperative, and Motivated.      Functional Mobility:      Current   Status  " Discharge   Goal   Functional Area: Care Score:    Sit to Lying 4  SBA, With use of gait belt as leg ; pt will benefit from a leg  Independent   Sit to Stand 3  CGA-Min A with RW Independent   Chair/Bed-to-Chair Transfer 3  CGA-Min A with RW Independent   Car Transfer 3  Min A with RW, stand step. Assistance getting RLE in/out of car. Independent   Walk 10 Feet 3  Min A with RW, 10ft.  Independent       Therapeutic Activities and Exercises:  Supine TherEx, x5-10 flynn:  - ankle pumps, glute sets, quad sets, hip/knee flexion (AAROM for RLE), hip abd/add (AAROM for RLE).     PT positioned pt in bed with pillows under RLE to prevent pressure wound on heel as well as promote decreased swelling in R thigh.    Activity Tolerance: Fair    Patient left HOB elevated with call button in reach.    Education provided: roles and goals of PT/PTA, transfer training, bed mob, body mechanics, assistive device, and strengthening exercises    Expected compliance: Moderate compliance    Plan:     During this hospitalization, patient to be seen 5 x/week (5-7 x/week) to address the identified rehab impairments via gait training, therapeutic activities, therapeutic exercises, neuromuscular re-education, wheelchair management/training and progress toward the following goals:    GOALS:   Multidisciplinary Problems       Physical Therapy Goals          Problem: Physical Therapy    Goal Priority Disciplines Outcome Interventions   Physical Therapy Goal     PT, PT/OT Progressing    Description: Bed Mobility:  Roll left and right with setup/clean-up assist.   Sit to supine transfer with supervision/touching assist.   Supine to sit transfer with supervision/touching assist.     Transfers:  Sit to stand transfer with supervision/touching assist using RW.   Bed to chair transfer with supervision/touching assist Stand Step  using RW.   Car transfer with partial/moderate assist using RW.    an object from the ground in standing  position with partial/moderate assist using RW.     Mobility:  Ambulate 75 feet with partial/moderate assist using RW.                         Plan of Care Expires:  11/15/24  PT Next Visit Date: 11/04/24  Plan of Care reviewed with: patient    Additional Information:         Time Tracking:     Therapy Time  PT Start Time: 1330  PT Stop Time: 1430  PT Total Time (min): 60 min   PT Individual: 60  Missed Time:    Time Missed due to:      Billable Minutes: Therapeutic Activity 45 and Therapeutic Exercise 15    10/29/2024

## 2024-10-29 NOTE — PROGRESS NOTES
[facility] - Rehab Inpatient Services  Wound Care    Patient Name:  Jerilyn Fernandez   MRN:  [id number]  Date: 10/29/2024  Diagnosis: Closed fracture of right femur    History:     No past medical history on file.    Social History     Socioeconomic History    Marital status:      Social Drivers of Health     Financial Resource Strain: Low Risk  (10/28/2024)    Overall Financial Resource Strain (CARDIA)     Difficulty of Paying Living Expenses: Not very hard   Food Insecurity: No Food Insecurity (10/28/2024)    Hunger Vital Sign     Worried About Running Out of Food in the Last Year: Never true     Ran Out of Food in the Last Year: Never true   Transportation Needs: No Transportation Needs (10/28/2024)    TRANSPORTATION NEEDS     Transportation : No   Stress: No Stress Concern Present (10/28/2024)    [employer] [place] [employer] - Occupational Stress Questionnaire     Feeling of Stress : Only a little   Housing Stability: Low Risk  (10/28/2024)    Housing Stability Vital Sign     Unable to Pay for Housing in the Last Year: No     Homeless in the Last Year: No       Precautions:     Allergies as of 10/28/2024    (No Known Allergies)       WOC Assessment Details/Treatment      10/29/24 1315        Wound 10/22/24 1157 Incision Right lateral Hip   Date First Assessed/Time First Assessed: 10/22/24 1157   Primary Wound Type: Incision  Side: Right  Orientation: lateral  Location: Hip  Closure Method: Staples  Additional Comments: XEROFORM, ISLAND DRESSING   Dressing Island/border        Wound 10/22/24 1223 Incision Right lateral;anterior Thigh horizontal   Date First Assessed/Time First Assessed: 10/22/24 1223   Primary Wound Type: Incision  Side: Right  Orientation: lateral;anterior  Location: Thigh  Incision Type: horizontal  Closure Method: Staples  Additional Comments: xeroform, cast padding, ACE   Dressing Island/border     Right hip and thigh incisions: Island dressings in  place.    Anterior thigh: medical adhesive skin injury. Leave CHA or cover with paper tape dressing.     10/29/2024

## 2024-10-29 NOTE — PT/OT/SLP EVAL
"Occupational Therapy Inpatient Rehab Re-Evaluation    Name: Jerilyn Fernandez  MRN: [id number]    Recommendations:     Discharge Recommendations:  Low Intensity Therapy   Discharge Equipment Recommendations:  (pending)   Barriers to discharge:  Time since onset    Assessment:  Jerilyn Fernandez is a 74 y.o. female admitted with a medical diagnosis of Closed fracture of right femur.  She presents with the following impairments/functional limitations:  weakness, impaired endurance, impaired self care skills, impaired functional mobility, gait instability, impaired balance, pain.    OT Note: Pt did well overall with initial OT evaluation. Pt ambulated from EOB > TTB in bathroom with CGA and RW. Once seated at TTB, pt voiced feeling weak and that her head felt heavy. BP taken: RUE 81/50 114P, LUE 86/49. OT assisted pt supine in bed to rest for a few minutes, took vitals again in supine 109/70 92P. Pt asymptomatic and agreed to continue with OT evaluation, but complete a sponge bath instead of shower. Sitting at EOB following a rest break in supine, pt's /64 96P. Pt asymptomatic through rest of OT evaluation. BP at 10:00 with BLEs elevated in bedside recliner 99/62 111P    General Precautions: Standard, fall     Orthopedic Precautions:RLE weight bearing as tolerated (ROMAT)     Braces: N/A    Rehab Prognosis: Good; patient would benefit from acute skilled OT services to address these deficits and reach maximum level of function.      History:     No past medical history on file.    Past Surgical History:   Procedure Laterality Date    RETROGRADE INTRAMEDULLARY RODDING OF DISTAL FEMUR Right 10/22/2024    Procedure: INSERTION, INTRAMEDULLARY BHARGAVI, FEMUR, DISTAL, RETROGRADE;  Surgeon: Mingo Sherman MD;  Location: [facility];  Service: Orthopedics;  Laterality: Right;  Supine, flat brigido, traction, chad  Synthes RFNA  please make second case       Subjective     Orientation: Oriented x4    Chief Complaint: "I'm feeling a " "little weak."    Patient/Family Comments/goals: "To get stronger."    Vitals  Pain Pain Rating 1: 5/10  Location - Side 1: Right  Location 1: hip     Respiratory Status: Room air    Patients cultural, spiritual, Islam conflicts given the current situation: no    Living Environment   Living Environment  People in Home: spouse  Name(s) of People in Home: Brooks  Living Arrangements: house  Number of Stairs, Main Entrance: none  Home Layout: Able to live on 1st floor  Transportation Anticipated: family or friend will provide  Equipment Currently Used at Home: none  Shower Setup: Tub/Shower combo and walk-in shower    Prior Level of Function  BADL: Independent  IADL: Independent    Equipment used at home: rollator, HHS, 4 pronged cane.      Upon discharge, patient will have assistance from spouse.    Objective:     Patient found supine with peripheral IV  upon OT entry to room.    Mobility   Patient completed:  Supine to Sit with stand by assistance  Sit to Supine with stand by assistance  Sit to Stand Transfer with minimum assistance with rolling walker  Stand to Sit Transfer with minimum assistance with rolling walker  Bed to Chair Transfer using Step Transfer technique with minimum assistance with rolling walker  Toilet Transfer Step Transfer technique with minimum assistance with  rolling walker and bedside commode    Functional Mobility   FM in/out bathroom with RW and CGA.    ADLs   Current Status   Eating 5   Oral Hygiene 88   Shower, Bathe Self 88   Upper Body Dressing 4   Lower Body Dressing 2   Toileting Hygiene 3   Toilet Transfer 4   Putting On, Taking Off Footwear 1     Limiting Factors for ADLs: motor, endurance, balance, weakness, and pain    Exams     ROM:          -       WFL    Hand Dominance: Right    ROM Hand  Left Hand: WFL  Right Hand: WFL    Strength  Overall Strength:          -       WFL    Sensation  Left:          -       WNL  Right:          -       WNL    Coordination:      -       " Intact    Tone  Left: WNL  Right: WNL    Visual/Perceptual  Intact    Cognition:   WFL      LifeStyle Change and Education     Patient left up in chair with call button in reach.    Education provided: Roles and goals of OT, ADLs, transfer training, bed mobility, body mechanics, assistive device, wheelchair precautions, modified goals, sequencing, safety precautions, fall prevention, equipment recommendations, and home safety    Multidisciplinary Problems       Occupational Therapy Goals          Problem: Occupational Therapy    Goal Priority Disciplines Outcome Interventions   Occupational Therapy Goal     OT, PT/OT Progressing    Description: ADLs:  Pt to perform grooming tasks with SBA standing at BS sink with LRAD  Pt to perform feeding tasks with independence   Pt to perform UB dressing with setup assist   Pt to perform LB dressing with mod A using AE as needed   Pt to perform putting on/off footwear task with max A using AE as needed  Pt to perform toileting with SBA  Pt to perform bathing with mod A using AE as needed    Functional Transfers:  Pt to perform toilet transfers with SBA and BSC over toilet   Pt to perform a tub transfer with CGA and TTB   Pt to perform a walk-in shower transfer with CGA     IADLs:  Pt to perform simple meal prep and light housekeeping with independence and LRAD                        Plan     During this hospitalization, patient to be seen  (5-7x/wk) to address the identified rehab impairments via self-care/home management, therapeutic activities, therapeutic exercises and progress toward the following goals:    Plan of Care Expires:  11/04/24    Time Tracking     OT Received On: 10/29/24  Time In 0830     Time Out 1000  Total Time 90 min  Therapy Time: OT Individual: 90  Missed Time:    Missed Time Reason:      Billable Minutes: Evaluation 30 and Self Care/Home Management 60    10/29/2024

## 2024-10-30 PROCEDURE — 11800000 HC REHAB PRIVATE ROOM

## 2024-10-30 PROCEDURE — 63600175 PHARM REV CODE 636 W HCPCS: Performed by: NURSE PRACTITIONER

## 2024-10-30 PROCEDURE — 97116 GAIT TRAINING THERAPY: CPT

## 2024-10-30 PROCEDURE — 97110 THERAPEUTIC EXERCISES: CPT

## 2024-10-30 PROCEDURE — 99900031 HC PATIENT EDUCATION (STAT)

## 2024-10-30 PROCEDURE — 25000003 PHARM REV CODE 250: Performed by: NURSE PRACTITIONER

## 2024-10-30 PROCEDURE — 94761 N-INVAS EAR/PLS OXIMETRY MLT: CPT

## 2024-10-30 PROCEDURE — 99233 SBSQ HOSP IP/OBS HIGH 50: CPT | Mod: ,,, | Performed by: NURSE PRACTITIONER

## 2024-10-30 PROCEDURE — 97535 SELF CARE MNGMENT TRAINING: CPT

## 2024-10-30 PROCEDURE — 94799 UNLISTED PULMONARY SVC/PX: CPT

## 2024-10-30 PROCEDURE — 97530 THERAPEUTIC ACTIVITIES: CPT

## 2024-10-30 RX ORDER — BUSPIRONE HYDROCHLORIDE 5 MG/1
5 TABLET ORAL
Status: DISCONTINUED | OUTPATIENT
Start: 2024-10-30 | End: 2024-11-07 | Stop reason: HOSPADM

## 2024-10-30 RX ADMIN — HYDROXYZINE PAMOATE 50 MG: 50 CAPSULE ORAL at 08:10

## 2024-10-30 RX ADMIN — SODIUM CHLORIDE 2000 MG: 1 TABLET ORAL at 08:10

## 2024-10-30 RX ADMIN — METHOCARBAMOL 500 MG: 500 TABLET ORAL at 08:10

## 2024-10-30 RX ADMIN — SODIUM CHLORIDE 2000 MG: 1 TABLET ORAL at 06:10

## 2024-10-30 RX ADMIN — BUSPIRONE HYDROCHLORIDE 5 MG: 5 TABLET ORAL at 02:10

## 2024-10-30 RX ADMIN — GABAPENTIN 300 MG: 300 CAPSULE ORAL at 10:10

## 2024-10-30 RX ADMIN — SODIUM CHLORIDE 2000 MG: 1 TABLET ORAL at 02:10

## 2024-10-30 RX ADMIN — METHOCARBAMOL 500 MG: 500 TABLET ORAL at 06:10

## 2024-10-30 RX ADMIN — OXYCODONE HYDROCHLORIDE 5 MG: 5 TABLET ORAL at 10:10

## 2024-10-30 RX ADMIN — GABAPENTIN 300 MG: 300 CAPSULE ORAL at 06:10

## 2024-10-30 RX ADMIN — ENOXAPARIN SODIUM 40 MG: 40 INJECTION SUBCUTANEOUS at 08:10

## 2024-10-30 RX ADMIN — OXYCODONE HYDROCHLORIDE 5 MG: 5 TABLET ORAL at 06:10

## 2024-10-30 RX ADMIN — OXYCODONE HYDROCHLORIDE 5 MG: 5 TABLET ORAL at 02:10

## 2024-10-30 RX ADMIN — ACETAMINOPHEN 650 MG: 325 TABLET ORAL at 08:10

## 2024-10-30 RX ADMIN — METHOCARBAMOL 500 MG: 500 TABLET ORAL at 02:10

## 2024-10-30 RX ADMIN — GABAPENTIN 300 MG: 300 CAPSULE ORAL at 04:10

## 2024-10-30 NOTE — PT/OT/SLP PROGRESS
"Occupational Therapy Inpatient Rehab Treatment    Name: Jerilyn Fernandez  MRN: [id number]    Assessment:  Jerilyn Fernandez is a 74 y.o. female admitted with a medical diagnosis of Closed fracture of right femur.  She presents with the following impairments/functional limitations:  weakness, impaired endurance, impaired functional mobility, decreased lower extremity function, pain, orthopedic precautions ("dizziness") .    General Precautions: Standard, fall, seizure     Orthopedic Precautions:RLE weight bearing as tolerated (ROMAT)     Braces: N/A    Rehab Prognosis: Good and Fair; patient would benefit from acute skilled OT services to address these deficits and reach maximum level of function.      History:     No past medical history on file.    Past Surgical History:   Procedure Laterality Date    RETROGRADE INTRAMEDULLARY RODDING OF DISTAL FEMUR Right 10/22/2024    Procedure: INSERTION, INTRAMEDULLARY BHARGAVI, FEMUR, DISTAL, RETROGRADE;  Surgeon: Mingo Sherman MD;  Location: [facility];  Service: Orthopedics;  Laterality: Right;  Supine, flat brigido, traction, triangles  Synthes RFNA  please make second case       Subjective     Orientation: Identifies self    Chief Complaint: "I just had pain medicine; My leg still hurts" and "I'm dizzy".    Patient/Family Comments/goals: "I want to be able to walk my dog."     Vitals   Vitals at Rest  /77   HR 79   O2 Sat 98%   Pain 7/10     Vitals With Activity              Pain 7/10     Respiratory Status: Room air    Patients cultural, spiritual, Yazidism conflicts given the current situation: no       Objective:     Patient found up in chair with   B LE's elevated  upon OT entry to room.    Mobility   Patient completed:  Sit to Stand Transfer with minimum assistance with rolling walker  Stand to Sit Transfer with minimum assistance with rolling walker  Toilet Transfer Step Transfer technique with minimum assistance with  rolling walker and bedside commode    Functional " Mobility  In room for toileting + void (urine)     ADLs   Current Status   Eating     Oral Hygiene     Shower, Bathe Self     Upper Body Dressing     Lower Body Dressing     Toileting Hygiene 4 steadying    Toilet Transfer 4   Putting On, Taking Off Footwear 2 c AE HOHA     Limiting Factors for ADLs: motor, limited ROM, balance, weakness, pain, and dizziness          Additional Treatments: Pt. C/o dizziness c bending forward. OT provided and educated Pt. On AE for LB DSG. Pt. C difficulty managing sock aid and reacher c L LE practice. Will benefit from additional education. Pt. C increased pain to R distal femus c flexion of R knee, preferring to keep B LE's in extension.     LifeStyle Change and Education:             Patient left up in chair with  ANDRE Padron  present.     Education provided: Roles and goals of OT, ADLs, transfer training, body mechanics, assistive device, safety precautions, and equipment recommendations    Multidisciplinary Problems       Occupational Therapy Goals          Problem: Occupational Therapy    Goal Priority Disciplines Outcome Interventions   Occupational Therapy Goal     OT, PT/OT Progressing    Description: ADLs:  Pt to perform grooming tasks with SBA standing at BS sink with LRAD  Pt to perform feeding tasks with independence   Pt to perform UB dressing with setup assist   Pt to perform LB dressing with mod A using AE as needed   Pt to perform putting on/off footwear task with max A using AE as needed  Pt to perform toileting with SBA  Pt to perform bathing with mod A using AE as needed    Functional Transfers:  Pt to perform toilet transfers with SBA and BSC over toilet   Pt to perform a tub transfer with CGA and TTB   Pt to perform a walk-in shower transfer with CGA     IADLs:  Pt to perform simple meal prep and light housekeeping with independence and LRAD                        Time Tracking     OT Received On: 10/30/24  Time In 1030     Time Out 1100  Total Time 30  min  Therapy Time: OT Individual: 30  Missed Time:    Missed Time Reason:      Billable Minutes: Self Care/Home Management 30    10/30/2024   wbat

## 2024-10-30 NOTE — PROGRESS NOTES
Dos 10/30/24  I have personally evaluated the patient during therapy today. Have discussed progress and problems with patient. Have reviewed barriers to progress as well as response to therapies with therapists. I have reviewed medical issues and plan of care with IM team. I met with  to review d/c plans and barriers. I have discussed skin integrity and B/B status with nursing. I am in agreement with present IRF plan of care.  I have been involved in  formation of this note with Isabella Silver.  Lauri Navas MD  Subjective  HPI: 75 yo WF with a PMH of HTN, HLD, anxiety, and migraines presented to the ED at [facility] on 10/21/24 after falling in the shower at home. Patient reported right lower extremity pain, and admits to having hit her head with a small bump noted to right temporal area. Denied LOC or blood thinners. Right femur XR showed displaced femoral diaphyseal fracture. Chest XR was negative for acute findings. Orthopedic surgeon consulted with recommendation for surgical intervention needed. On 10/22, patient underwent a right intramedullary stabilization of the femur shaft fracture by Dr. MELIA Sherman. Patient was placed WBAT to RLE with full range of motion of the right lower extremity. Started on Lovenox for DVT ppx. Hospital medicine was consulted for multiple runs of SVT post op, and was given Esmolol. EKG showed sinus tachycardia with T-wave abnormality, consider inferior ischemia. Cardiology ([facility]) was consulted for evaluation and treatment of SVT. Started on Metoprolol tart 12.5mg PO BID, gave 2gm of MGSO4 x 1 dose, and checked TSH and free T4 which were both WNL. On 10/23, PT/OT evals completed with deficits noted with recommendation for high intensity therapy needed. NSR noted to telemetry, and BP borderline low. Labs showed low Na of 130, elevated potassium of 5.2, elevated Cr of 1.08, low calcium of 8.1, low H&H of 6.8 & 20.2. Patient was transfused 2 units of PRBCs. On 10/24, labs showed low H&H  of 6.4 & 18.6 post transfusion of 2 units so was transfused another 2 units of PRBCs, and Low Na of 125 so salt tabs given. BP remained low so betablocker held if SBP <100 and flomax held. Cardiology recommended patient to follow up with Dr Barnhart on discharge with no further recommendations, and signed off of case. On 10/25, labs showed low H&H of 8.3 & 23.1, low Na of 132, and low PLT of 110. Daily dressing changes started, no creams or ointments, and ok to shower on POD#7. On 10/26, labs showed low H&H of 7.5 & 21.9 so 1 unit of PRBCs given. On 10/27, labs showed low H&H of 9.2 & 27.0, low Na of 135, low calcium of 8.1. On 10/28, labs showed low H&H of 8.5 & 24.9, low Na of 133, low Ca of 8.2, elevated ALP o 293, low albumin of 2.1, elevated AST of 61, elevated CRP of 141.50. Patient will need follow up appt with Dr Sherman in 2 weeks. Okjanna for ASA 81mg BID on DC. Patient reported last BM on 10/20. Patient is AAOx4.  Participating with therapy. Functional status includes setup assist needed for eating, moderate assist for bed mobility, moderate assist or transfers with RW, walked 7ft with RW at moderate assist, max assist for lower body dressing, total assist for toileting with purwick in place, and setup assist for grooming while seated. Patient was evaluated, accepted, and admitted to inpatient rehab to improve functional status. Transferred to [facility] on 10/28 without incident.      10/30: Seen with RT, seated in WC. Reports better night without nightmares. States that she did get something for pain and something for sleep which was helpful and did not leave her groggy this morning. Improved. Participating in therapy without current complaint. VSSAF.             Review of Systems  Depression/Anxiety: no complaints     Pain: RLE     gabapentin capsule 300 mg TID AC  methocarbamoL tablet 500 mg q8h  acetaminophen tablet 650 mg q6h PRN mild pain  oxyCODONE immediate release tablet 5 mg q4h PRN mod/severe  pain  Bowels/Bladder: last BM 10/29; 10/27 (small soft per patient); previous 10/20    Appetite: decreased. Forcing herself to eat for strength. Intake 50% at breakfast    Sleep: c/o nightmare. Don't give gabapentin near bedtime-improved with pain and sleep meds      hydrOXYzine pamoate capsule 50 mg qHS PRN Insomnia        Physical Exam  General: well-developed, well-nourished, in no acute distress  Respiratory: equal chest rise, no SOB, no audible wheeze  Cardiovascular: regular rate and rhythm, no edema  Gastrointestinal: soft, non-tender, non-distended   Musculoskeletal: decreased ROM/strength to RLE  Integumentary: right upper thigh (groin) skin tear, incision, and blackened scab, right lateral thigh and knee incisions-staples, dressings-c/d/i  Neurologic: cranial nerves intact, no signs of peripheral neurological deficit, motor/sensory function intact  *MD performed and documented physical examination                 Diagnostics:  US LOWER EXTREMITY VEINS RIGHT   Impression:  No deep vein thrombosis identified in the right leg.  Date:                                            10/29/2024  Time:                                           16:09            Assessment/Plan  Hospital   Closed fracture of right femur   Fall   Acute blood loss anemia     Non-Hospital   HTN (hypertension)   HLD (hyperlipidemia)   Anxiety   Migraines       Wounds: right upper thigh (groin) skin tear, incision, and blackened scab, right lateral thigh and knee incisions-staples, dressings-c/d/i  On 10/22, patient underwent a right intramedullary stabilization of the femur shaft fracture by Dr. MELIA Sherman.   Precautions: Patient was placed WBAT to RLE with full range of motion of the right lower extremity.  Bracing/AD: RW  Swallowing: Regular Diet  Function: Tolerating therapy. Continue PT/OT  VTE Prophylaxis:   enoxaparin injection 40 mg SubQ q12h  Code Status: FULL CODE   Discharge:Lives with her spouse in [place] in a single-story home  with no steps to enter the residence. Has a GED. She has no  history. She is retired. She worked in the [employer] office for 28 years.  is retired. He states that they share most household duties. He states that he does a little more than she does because he is 11 years younger than her. He also manages finances with the patient and does yardwork. Children: (0). Date pending.                 Catalina Silver NP, conducted additional independent physical examination and assisted with medical documentation.

## 2024-10-30 NOTE — PT/OT/SLP PROGRESS
"Occupational Therapy Inpatient Rehab Treatment    Name: Jerilyn Fernandez  MRN: [id number]    Assessment:  Jerilyn Fernandez is a 74 y.o. female admitted with a medical diagnosis of Closed fracture of right femur.  She presents with the following impairments/functional limitations:  weakness, impaired endurance, impaired functional mobility, gait instability, pain, impaired coordination.    General Precautions: Standard, fall, seizure     Orthopedic Precautions:RLE weight bearing as tolerated (ROMAT)     Braces: N/A    Rehab Prognosis: Good; patient would benefit from acute skilled OT services to address these deficits and reach maximum level of function.      History:     No past medical history on file.    Past Surgical History:   Procedure Laterality Date    RETROGRADE INTRAMEDULLARY RODDING OF DISTAL FEMUR Right 10/22/2024    Procedure: INSERTION, INTRAMEDULLARY BHARGAVI, FEMUR, DISTAL, RETROGRADE;  Surgeon: Mingo Sherman MD;  Location: [facility];  Service: Orthopedics;  Laterality: Right;  Supine, flat brigido, traction, triangles  Synthes RFNA  please make second case       Subjective     Orientation: Oriented x4    Chief Complaint: "I'd like to get washed up"    Patient/Family Comments/goals: To get stronger and more independent.    Respiratory Status: Room air    Patients cultural, spiritual, Orthodox conflicts given the current situation: no       Objective:     Patient found supine with peripheral IV  upon OT entry to room.    Mobility   Patient completed:  Supine to Sit with supervision  Sit to Stand Transfer with stand by assistance with rolling walker  Stand to Sit Transfer with stand by assistance with rolling walker  Bed to Chair Transfer using Step Transfer technique with stand by assistance with rolling walker  Toilet Transfer Step Transfer technique with stand by assistance with  rolling walker and bedside commode    Functional Mobility  FM in/out bathroom with RW and supervision. No LOB noted.    ADLs   Current " Status   Oral Hygiene 5 *sitting* in w/c. Pt refused to stand with RW at this time   Shower, Bathe Self 3 sponge bath at EOB per pt's request. Assist to bathe RLE and dry it   Upper Body Dressing 5   Lower Body Dressing 3 assist to thread each LE into pants. Pt would benefit from AE.   Toileting Hygiene 4 + void, urine and BM   Toilet Transfer 4   Putting On, Taking Off Footwear 3     Limiting Factors for ADLs: endurance, limited ROM, weakness, and pain     LifeStyle Change and Education:     Patient left up in chair with call button in reach.     Education provided: Roles and goals of OT, ADLs, transfer training, bed mobility, body mechanics, wheelchair precautions, modified goals, sequencing, safety precautions, fall prevention, equipment recommendations, and home safety    Multidisciplinary Problems       Occupational Therapy Goals          Problem: Occupational Therapy    Goal Priority Disciplines Outcome Interventions   Occupational Therapy Goal     OT, PT/OT Progressing    Description: ADLs:  Pt to perform grooming tasks with SBA standing at BS sink with LRAD  Pt to perform feeding tasks with independence   Pt to perform UB dressing with setup assist   Pt to perform LB dressing with mod A using AE as needed   Pt to perform putting on/off footwear task with max A using AE as needed  Pt to perform toileting with SBA  Pt to perform bathing with mod A using AE as needed    Functional Transfers:  Pt to perform toilet transfers with SBA and BSC over toilet   Pt to perform a tub transfer with CGA and TTB   Pt to perform a walk-in shower transfer with CGA     IADLs:  Pt to perform simple meal prep and light housekeeping with independence and LRAD                        Time Tracking     OT Received On: 10/30/24  Time In 0830     Time Out 0900  Total Time 30 min  Therapy Time: OT Individual: 30  Missed Time:    Missed Time Reason:      Billable Minutes: Self Care/Home Management 30    10/30/2024

## 2024-10-30 NOTE — PROGRESS NOTES
10/30/24 1100   Rec Therapy Time Calculation   Date of Treatment 10/30/24   Rec Start Time 1100   Rec Stop Time 1130   Rec Total Time (min) 30 min   Time   Treatment time 2 units   Charges   $Therapeutic Exercise 2 units   Precautions   General Precautions fall;seizure   Orthopedic Precautions  RLE weight bearing as tolerated   Braces N/A   Pain/Comfort   Pain Rating 1 6/10   Location - Side 1 Right   Location - Orientation 1 lower   Location 1 hip   Pain Addressed 1 Reposition   Overall Level of Functioning   Activity Tolerance Independent   Dynamic Sitting Balance/Reaching Independent   Dynamic Standing Balance/Reaching Standby Assist   Right UE Coodination/Dexterity Independent   Left UE Coordination/Dexterity Independent   Problem Solving/Sequencing Skills Independent   Memory Recall Independent   R/L Neglect/Inattention Does not occur   Attention Span Independent   Social Interaction Independent   Recreational Therapy Short Term Goals   Short Term Goal 1 Progression Met   Short Term Goal 2 Progression Met   Recreational Therapy Long Term Goals   Long Term Goal 1 Progression Progressing   Long Term Goal 2 Progression Progressing   Plan   Patient to be seen Daily   Planned Duration 2 weeks   Treatments Planned Energy conservation training   Treatment plan/goals estblished with Patient/Caregiver Yes

## 2024-10-30 NOTE — PLAN OF CARE
Problem: Rehabilitation (IRF) Plan of Care  Goal: Plan of Care Review  Outcome: Progressing  Goal: Patient-Specific Goal (Individualized)  Outcome: Progressing  Goal: Absence of New-Onset Illness or Injury  Outcome: Progressing  Goal: Optimal Comfort and Wellbeing  Outcome: Progressing  Goal: Home and Community Transition Plan Established  Outcome: Progressing     Problem: Wound  Goal: Optimal Coping  Outcome: Progressing  Goal: Optimal Functional Ability  Outcome: Progressing  Goal: Absence of Infection Signs and Symptoms  Outcome: Progressing  Goal: Improved Oral Intake  Outcome: Progressing  Goal: Optimal Pain Control and Function  Outcome: Progressing  Goal: Skin Health and Integrity  Outcome: Progressing  Goal: Optimal Wound Healing  Outcome: Progressing     Problem: Fall Injury Risk  Goal: Absence of Fall and Fall-Related Injury  Outcome: Progressing     Problem: Skin Injury Risk Increased  Goal: Skin Health and Integrity  Outcome: Progressing

## 2024-10-30 NOTE — PT/OT/SLP PROGRESS
"Physical Therapy Inpatient Rehab Treatment    Patient Name:  Jerilyn Fernandez   MRN:  [id number]    Recommendations:     Discharge Recommendations:  Low Intensity Therapy   Discharge Equipment Recommendations:     Barriers to discharge: Impaired functional mobility  and Severity of Deficits     Assessment:     Jerilyn Fernandez is a 74 y.o. female admitted with a medical diagnosis of Closed fracture of right femur.  She presents with the following impairments/functional limitations:  weakness, impaired endurance, impaired functional mobility, gait instability, impaired balance, impaired cognition, decreased lower extremity function, pain, decreased ROM, edema, orthopedic precautions.    PT Note: Pt appears anxious, possibly causing poor memory. Notified VASHTI Mason.    Rehab Diagnosis: Fall in shower, right femoral shaft fracture s/p IM rodding 10/22/2024. Pt. Has a history of HTN, HLD, anxiety, and migraines.    General Precautions: Standard, fall     Orthopedic Precautions:RLE weight bearing as tolerated (ROMAT)     Braces: N/A    Rehab Prognosis: Good; patient would benefit from acute skilled PT services to address these deficits and reach maximum level of function.      History:     No past medical history on file.    Past Surgical History:   Procedure Laterality Date    RETROGRADE INTRAMEDULLARY RODDING OF DISTAL FEMUR Right 10/22/2024    Procedure: INSERTION, INTRAMEDULLARY BHARGAVI, FEMUR, DISTAL, RETROGRADE;  Surgeon: Mingo Sherman MD;  Location: [facility];  Service: Orthopedics;  Laterality: Right;  Supine, flat brigido, traction, triangles  Synthes RFNA  please make second case       Subjective     Patient comments: "My leg is achy"    Respiratory Status: Room air    Patients cultural, spiritual, Nondenominational conflicts given the current situation: no    Objective:     Patient found up in chair with peripheral IV  upon PT entry to room.    Pt is  Anxious, Alert, and Cooperative.    Functional Mobility:      Current   Status  " Discharge   Goal   Functional Area: Care Score:    Sit to Lying 4  SBA with introduction of leg , HOB flat Independent   Sit to Stand 4  CGA with RW Independent   Chair/Bed-to-Chair Transfer 4  CGA with RW, stand step Independent   Walk 10 Feet 4  CGA with RW. AM session: 20ft + 15ft. PM session: 18ft + 7ft. Pt anxious, limiting distance.  Independent       Therapeutic Activities and Exercises:  PM session:   - seated edge of WC, pt performed 2x5R/10L flynn: LAQ, hip flexion, HSC with yellow TB. Limited AROM on RLE d/t swelling and pain.    - STS x5, VC for technique and foot/hand placements.    Activity Tolerance: Fair    Patient left HOB elevated with call button in reach.    Education provided: roles and goals of PT/PTA, bed mob, gait training, body mechanics, and strengthening exercises    Expected compliance: Moderate compliance    Plan:     During this hospitalization, patient to be seen 5 x/week (5-7 x/week) to address the identified rehab impairments via gait training, therapeutic activities, therapeutic exercises, neuromuscular re-education, wheelchair management/training and progress toward the following goals:    GOALS:   Multidisciplinary Problems       Physical Therapy Goals          Problem: Physical Therapy    Goal Priority Disciplines Outcome Interventions   Physical Therapy Goal     PT, PT/OT Progressing    Description: Bed Mobility:  Roll left and right with setup/clean-up assist.   Sit to supine transfer with supervision/touching assist.   Supine to sit transfer with supervision/touching assist.     Transfers:  Sit to stand transfer with supervision/touching assist using RW.   Bed to chair transfer with supervision/touching assist Stand Step  using RW.   Car transfer with partial/moderate assist using RW.    an object from the ground in standing position with partial/moderate assist using RW.     Mobility:  Ambulate 75 feet with partial/moderate assist using RW.                          Plan of Care Expires:  11/15/24  PT Next Visit Date: 11/04/24  Plan of Care reviewed with: patient    Additional Information:         Time Tracking:     Therapy Time  PT Start Time:  (1130; 1330)  PT Stop Time:  (1200; 1430)   PT Individual: 90  Missed Time:    Time Missed due to:      Billable Minutes: Gait Training 30, Therapeutic Activity 30, and Therapeutic Exercise 30    10/30/2024

## 2024-10-30 NOTE — PROGRESS NOTES
[facility] ([facility])  Rehab Progress Note    Patient Name: Jerilyn Fernandez  MRN: [id number]  Age: 74 y.o. Sex: female  : 1950  Hospital Length of Stay: 2 days  Date of Service: 10/30/2024   Chief Complaint: Right femur fracture 2/2 fall s/p IM stabilization on 10/22     Subjective:     Basic Information  Admit Information: 74-year-old WF presented to [facility] on 10/21 via EMS with complaints of right-sided hip pain after slipping and falling at home.  PMH significant for hypertension and hyperlipidemia.  Workup significant for right femur fracture.  On 10/22 tolerated IM stabilization without perioperative complications.  Postoperatively reported SVT requiring esmolol then transition to normal sinus rhythm.  Initiated metoprolol tartrate 12.5 mg b.i.d. per Cardiology.  Continued with orthostatic hypotension and trauma surgery held beta-blockers and Flomax.  Required 1 unit PRBC transfusion on 10/26. Tolerated transfer to [facility] inpatient rehab unit on 10/28 without incident.   Today's Information: No acute events overnight.  Laying in bed comfortably.  Reports good sleep and appetite.  Last BM 10/29.  Vital signs at goal with no recent recorded fevers.  No orthostatic hypotension this morning.  No labs today.  Right lower extremity venous ultrasound negative for DVT.    Review of patient's allergies indicates:  No Known Allergies     Current Facility-Administered Medications:     acetaminophen tablet 650 mg, 650 mg, Oral, Q6H PRN, Briana Andrade, FNP, 650 mg at 10/29/24 210    benzonatate capsule 100 mg, 100 mg, Oral, TID PRN, Briana Andrade, FNP    docusate sodium capsule 100 mg, 100 mg, Oral, BID, Briana Andrade, FNP, 100 mg at 10/29/24 0743    enoxaparin injection 40 mg, 40 mg, Subcutaneous, Q12H, Briana Andrade, FNP, 40 mg at 10/30/24 0825    gabapentin capsule 300 mg, 300 mg, Oral, TID ACNadeem Kathryn A, FNP, 300 mg at 10/30/24 1009    hydrALAZINE injection 10 mg,  "10 mg, Intravenous, Q4H PRN, Briana Andrade B, FNP    HYDROcodone-acetaminophen 5-325 mg per tablet 1 tablet, 1 tablet, Oral, Daily PRN, Briana Andrade, FNP    hydrOXYzine pamoate capsule 50 mg, 50 mg, Oral, Nightly PRN, Briana Andrade B, FNP, 50 mg at 10/29/24 2056    labetalol 20 mg/4 mL (5 mg/mL) IV syring, 10 mg, Intravenous, Q4H PRN, Briana Andrade B, FNP    methocarbamoL tablet 500 mg, 500 mg, Oral, Q8H, Briana Andrade B, FNP, 500 mg at 10/30/24 0607    metoprolol injection 10 mg, 10 mg, Intravenous, Q2H PRN, Briana Andrade B, FNP    nitroGLYCERIN SL tablet 0.4 mg, 0.4 mg, Sublingual, Q5 Min PRN, Briana Andrade, FNP    ondansetron disintegrating tablet 4 mg, 4 mg, Oral, Q6H PRN, Briana Andrade B, FNP    ondansetron injection 4 mg, 4 mg, Intravenous, Q8H PRN, Briana Andrade B, FNP    oxyCODONE immediate release tablet 5 mg, 5 mg, Oral, Q4H PRN, Briana Andrade B, FNP, 5 mg at 10/30/24 1009    polyethylene glycol packet 17 g, 17 g, Oral, Q12H PRN, Briana Andrade B, FNP    polyvinyl alcohol-povidon(PF) 1.4-0.6 % Dpet 1 drop, 1 drop, Ophthalmic, Q4H PRN, Briana Andrade B, FNP, 1 drop at 10/29/24 1354    sodium chloride oral tablet 2,000 mg, 2,000 mg, Oral, TID, Briana Andrade B, FNP, 2,000 mg at 10/30/24 0607     Review of Systems   Complete 12-point review of symptoms negative except for what's mentioned in HPI     Objective:     /69   Pulse 82   Temp 98.7 °F (37.1 °C) (Oral)   Resp 18   Ht 5' 2" (1.575 m)   Wt 65 kg (143 lb 4.8 oz)   SpO2 95%   BMI 26.21 kg/m²      Physical Exam  Vitals reviewed.   Eyes:      Pupils: Pupils are equal, round, and reactive to light.   Cardiovascular:      Rate and Rhythm: Normal rate and regular rhythm.      Heart sounds: Normal heart sounds.   Pulmonary:      Effort: Pulmonary effort is normal.      Breath sounds: Normal breath sounds.   Abdominal:      General: Bowel sounds are normal.   Musculoskeletal:         General: Normal range of motion. "   Skin:     General: Skin is warm.      Comments: Right lower extremity incision dry and intact    Neurological:      General: No focal deficit present.      Mental Status: She is alert and oriented to person, place, and time.      Motor: Weakness present.   Psychiatric:         Mood and Affect: Mood normal.     *MD performed and documented physical examination       Lines/Drains/Airways       Peripheral Intravenous Line  Duration                  Peripheral IV - Single Lumen 10/21/24 1136 20 G Anterior;Right Forearm 8 days                    Labs  No results found for this or any previous visit (from the past 24 hours).    Radiology  Right femur x-ray 10/22/2024:  Impression:  Interval insertion of IM carina.  Suprapatellar effusion.    Assessment/Plan:     74 y.o. WF admitted on 10/28/2024     Right femur fracture 2/2 fall   - s/p IM stabilization on 10/22  - RLE WBAT, full ROM  - continue                Gabapentin 300 mg t.i.d.                Robaxin 500 mg every 8 hours  - follow-up with orthopedic surgery outpatient     Hyponatremia  - stable  - continue                Sodium chloride 2000 mg t.i.d.     Constipation   - Stable   - Continue                 Colace 100 mg b.i.d.     Anemia  - asymptomatic  - s/p transfusion                x1 units PRBC on 10/26/2024  - H/H stable   - no evidence of active bleeds  - will closely monitor and transfuse if needed      SVT  - currently in normal sinus rhythm  - held metoprolol tartrate 12.5 mg b.i.d. 2/2 reported orthostatic hypotension  - follow-up with cardiology outpatient     HLD  - FLP outpatient  - not currently on statin     HFpEF  - EF 65% with grade 1 diastolic dysfunction  - follow-up with cardiology outpatient     VHD  - mild MR/MS/TR  - follow-up with cardiology outpatient     Major depressive disorder  - current  - continue   BuSpar 5 mg t.i.d. (initiated 10/29)     VTE Prophylaxis:  Lovenox 40 mg b.i.d.     POA: no  Living will: no  Contacts: Brooks Fernandez  (spouse) [phone]     CODE STATUS: Full  Internal Medicine (attending): Alexander Kitchen MD  Physiatry (consulting):  Isaiah Navas MD     OUTPATIENT PROVIDERS  PCP: DEX Kenny  Cardiology: Doni Barnhart MD  Orthopedic surgery: Mingo Sherman MD     DISPOSITION:  Vital signs at goal.  No labs today.  Right lower extremity venous ultrasound negative for DVT.  Bowel management, sleep hygiene, and appetite at goal.  Continues to progress well with therapies.  Monitor closely.  Notify of any acute changes.       Briana Andrade NP conducted independent physical examination and assisted with medical documentation.     Total time spent on this encounter including chart review and direct MD + NP 1-on-1 patient interaction: 51 minutes   Over 50% of this time was spent in counseling and coordination of care

## 2024-10-30 NOTE — PLAN OF CARE
Problem: Fall Injury Risk  Goal: Absence of Fall and Fall-Related Injury  Outcome: Progressing  Intervention: Identify and Manage Contributors  Flowsheets (Taken 10/30/2024 1128)  Self-Care Promotion:   independence encouraged   BADL personal objects within reach   BADL personal routines maintained  Medication Review/Management: medications reviewed  Intervention: Promote Injury-Free Environment  Flowsheets (Taken 10/30/2024 1128)  Safety Promotion/Fall Prevention:   Fall Risk reviewed with patient/family   Fall Risk signage in place   gait belt with ambulation   instructed to call staff for mobility   nonskid shoes/socks when out of bed

## 2024-10-30 NOTE — PT/OT/SLP PROGRESS
"Occupational Therapy Inpatient Rehab Treatment    Name: Jerilyn Fernandez  MRN: [id number]    Assessment:  Jerilyn Fernandez is a 74 y.o. female admitted with a medical diagnosis of Closed fracture of right femur.  She presents with the following impairments/functional limitations:  weakness, impaired endurance, impaired self care skills, impaired functional mobility, gait instability, impaired balance, decreased lower extremity function, pain, orthopedic precautions.    General Precautions: Standard, fall, seizure     Orthopedic Precautions:RLE weight bearing as tolerated (ROMAT)     Braces: N/A    Rehab Prognosis: Good; patient would benefit from acute skilled OT services to address these deficits and reach maximum level of function.      History:     No past medical history on file.    Past Surgical History:   Procedure Laterality Date    RETROGRADE INTRAMEDULLARY RODDING OF DISTAL FEMUR Right 10/22/2024    Procedure: INSERTION, INTRAMEDULLARY BHARGAVI, FEMUR, DISTAL, RETROGRADE;  Surgeon: Mingo Sherman MD;  Location: [facility];  Service: Orthopedics;  Laterality: Right;  Supine, flat brigido, traction, triangles  Synthes RFNA  please make second case       Subjective     Orientation: Oriented x4    Chief Complaint: pain    Patient/Family Comments/goals: "My leg hurts"    Respiratory Status: Room air    Patients cultural, spiritual, Orthodox conflicts given the current situation: no     Objective:     Patient found up in chair with peripheral IV  upon OT entry to room.    Mobility   Patient completed:  Sit to Stand Transfer with supervision with rolling walker  Stand to Sit Transfer with supervision with rolling walker  Tub Transfer Step Transfer technique with supervision with rolling walker    Functional Mobility  In room mobility for toileting with SPV with RW.    ADLs   Current Status   Toileting Hygiene 4   Toilet Transfer 4     Limiting Factors for ADLs: motor, endurance, weakness, and pain     Patient left up in chair " with all lines intact and call button in reach.     Education provided: Roles and goals of OT, ADLs, transfer training, safety precautions, and fall prevention    Multidisciplinary Problems       Occupational Therapy Goals          Problem: Occupational Therapy    Goal Priority Disciplines Outcome Interventions   Occupational Therapy Goal     OT, PT/OT Progressing    Description: ADLs:  Pt to perform grooming tasks with SBA standing at BS sink with LRAD  Pt to perform feeding tasks with independence   Pt to perform UB dressing with setup assist   Pt to perform LB dressing with mod A using AE as needed   Pt to perform putting on/off footwear task with max A using AE as needed  Pt to perform toileting with SBA  Pt to perform bathing with mod A using AE as needed    Functional Transfers:  Pt to perform toilet transfers with SBA and BSC over toilet   Pt to perform a tub transfer with CGA and TTB   Pt to perform a walk-in shower transfer with CGA     IADLs:  Pt to perform simple meal prep and light housekeeping with independence and LRAD                        Time Tracking     OT Received On: 10/30/24  Time In 1300     Time Out 1330  Total Time 30 min  Therapy Time: OT Individual: 30  Missed Time:    Missed Time Reason:      Billable Minutes: Self Care/Home Management 30    10/30/2024

## 2024-10-30 NOTE — PT/OT/SLP PROGRESS
Recreational Therapy Treatment    Date of Treatment: 10/30/24  Start Time: 1100  Stop Time: 1130  Total Time: 30 min  Missed Time:    General Precautions: fall, seizure  Ortho Precautions: RLE weight bearing as tolerated  Braces: N/A    Vitals   Vitals at Rest  BP    HR    O2 Sat    Pain 6/10     Vitals With Activity  BP    HR    O2 Sat    Pain 6/10       Treatment     Cognitive Skills Building   Cognitive Observation Activity Assist Position Equipment Response            Comment:          Dynamic Activities   Activity Assist Position Equipment Response   Activity 1 Bocce ball supervision Standing Rolling walker and Bocce balls good   Comment: Sit to stand was supervision as was dynamic standing balance/reaching.  Standing tolerance was 3 minutes with sitting rest breaks.  UE coordination was I.  Comprehension and problem solving skills were setup.  Said she liked [employer]       Fine Motor Activities   Activity Assist Position Equipment Response           Comment:          Additional Info: Some anxiety noted    Goals     Short Term Goals    Goal  Goal Status   Will increase sit to stand to supervision Met   Will improve dynamic standing balance/reaching to supervision Met                 Long Term Goals    Goal Goal Status   Will increase standing tolerance to 5,10 minutes Progressing   Will improve dynamic standing balance/reaching to setup/I Progressing

## 2024-10-31 PROCEDURE — 25000003 PHARM REV CODE 250: Performed by: NURSE PRACTITIONER

## 2024-10-31 PROCEDURE — 97110 THERAPEUTIC EXERCISES: CPT

## 2024-10-31 PROCEDURE — 11800000 HC REHAB PRIVATE ROOM

## 2024-10-31 PROCEDURE — 63600175 PHARM REV CODE 636 W HCPCS: Performed by: NURSE PRACTITIONER

## 2024-10-31 PROCEDURE — 99900031 HC PATIENT EDUCATION (STAT)

## 2024-10-31 PROCEDURE — 94799 UNLISTED PULMONARY SVC/PX: CPT

## 2024-10-31 PROCEDURE — 97116 GAIT TRAINING THERAPY: CPT

## 2024-10-31 PROCEDURE — 97530 THERAPEUTIC ACTIVITIES: CPT

## 2024-10-31 PROCEDURE — 97535 SELF CARE MNGMENT TRAINING: CPT

## 2024-10-31 RX ORDER — FUROSEMIDE 10 MG/ML
40 INJECTION INTRAMUSCULAR; INTRAVENOUS ONCE
Status: COMPLETED | OUTPATIENT
Start: 2024-10-31 | End: 2024-10-31

## 2024-10-31 RX ORDER — HYDROXYZINE PAMOATE 50 MG/1
50 CAPSULE ORAL NIGHTLY
Status: DISCONTINUED | OUTPATIENT
Start: 2024-10-31 | End: 2024-11-01

## 2024-10-31 RX ORDER — SODIUM CHLORIDE 1 G/1
2000 TABLET ORAL 2 TIMES DAILY
Status: DISCONTINUED | OUTPATIENT
Start: 2024-10-31 | End: 2024-11-01

## 2024-10-31 RX ADMIN — SODIUM CHLORIDE 2000 MG: 1 TABLET ORAL at 08:10

## 2024-10-31 RX ADMIN — ENOXAPARIN SODIUM 40 MG: 40 INJECTION SUBCUTANEOUS at 08:10

## 2024-10-31 RX ADMIN — SODIUM CHLORIDE 2000 MG: 1 TABLET ORAL at 05:10

## 2024-10-31 RX ADMIN — GABAPENTIN 300 MG: 300 CAPSULE ORAL at 05:10

## 2024-10-31 RX ADMIN — GABAPENTIN 300 MG: 300 CAPSULE ORAL at 11:10

## 2024-10-31 RX ADMIN — POLYVINYL ALCOHOL, POVIDONE 1 DROP: 14; 6 SOLUTION/ DROPS OPHTHALMIC at 08:10

## 2024-10-31 RX ADMIN — HYDROXYZINE PAMOATE 50 MG: 50 CAPSULE ORAL at 09:10

## 2024-10-31 RX ADMIN — DOCUSATE SODIUM 100 MG: 100 CAPSULE, LIQUID FILLED ORAL at 08:10

## 2024-10-31 RX ADMIN — METHOCARBAMOL 500 MG: 500 TABLET ORAL at 09:10

## 2024-10-31 RX ADMIN — BUSPIRONE HYDROCHLORIDE 5 MG: 5 TABLET ORAL at 05:10

## 2024-10-31 RX ADMIN — METHOCARBAMOL 500 MG: 500 TABLET ORAL at 05:10

## 2024-10-31 RX ADMIN — FUROSEMIDE 40 MG: 10 INJECTION, SOLUTION INTRAMUSCULAR; INTRAVENOUS at 11:10

## 2024-10-31 RX ADMIN — BUSPIRONE HYDROCHLORIDE 5 MG: 5 TABLET ORAL at 11:10

## 2024-10-31 RX ADMIN — ACETAMINOPHEN 650 MG: 325 TABLET ORAL at 05:10

## 2024-10-31 RX ADMIN — GABAPENTIN 300 MG: 300 CAPSULE ORAL at 04:10

## 2024-10-31 RX ADMIN — OXYCODONE HYDROCHLORIDE 5 MG: 5 TABLET ORAL at 08:10

## 2024-10-31 RX ADMIN — OXYCODONE HYDROCHLORIDE 5 MG: 5 TABLET ORAL at 02:10

## 2024-10-31 RX ADMIN — METHOCARBAMOL 500 MG: 500 TABLET ORAL at 02:10

## 2024-10-31 RX ADMIN — BUSPIRONE HYDROCHLORIDE 5 MG: 5 TABLET ORAL at 04:10

## 2024-10-31 NOTE — PT/OT/SLP PROGRESS
"Physical Therapy Inpatient Rehab Treatment    Patient Name:  Jerilyn Fernandez   MRN:  [id number]    Recommendations:     Discharge Recommendations:  Low Intensity Therapy   Discharge Equipment Recommendations:     Barriers to discharge: Impaired functional mobility     Assessment:     Jerilyn Fernandez is a 74 y.o. female admitted with a medical diagnosis of Closed fracture of right femur.  She presents with the following impairments/functional limitations:  weakness, impaired endurance, impaired functional mobility, gait instability, impaired balance, impaired cognition, decreased lower extremity function, pain, decreased ROM, edema, orthopedic precautions.    PT Note: Pt still highly anxious but improving in mobility and confidence. PT discussed with VASHTI Mason about need for Lasix to decrease swelling in R thigh. Pt received one dose prior to PM session and required toileting 2x in 1 hour.     Rehab Diagnosis: Fall in shower, right femoral shaft fracture s/p IM rodding 10/22/2024. Pt. Has a history of HTN, HLD, anxiety, and migraines.    General Precautions: Standard, fall     Orthopedic Precautions:RLE weight bearing as tolerated (ROMAT)     Braces: N/A    Rehab Prognosis: Good; patient would benefit from acute skilled PT services to address these deficits and reach maximum level of function.      History:     No past medical history on file.    Past Surgical History:   Procedure Laterality Date    RETROGRADE INTRAMEDULLARY RODDING OF DISTAL FEMUR Right 10/22/2024    Procedure: INSERTION, INTRAMEDULLARY BHARGAVI, FEMUR, DISTAL, RETROGRADE;  Surgeon: Mingo Sherman MD;  Location: [facility];  Service: Orthopedics;  Laterality: Right;  Supine, flat brigido, traction, triangles  Synthes RFNA  please make second case       Subjective     Patient comments: "My leg feels stiff"    Respiratory Status: Room air    Patients cultural, spiritual, Buddhist conflicts given the current situation: no    Objective:     Patient found up in chair " with peripheral IV  upon PT entry to room.    Pt is  Anxious, Alert, Cooperative, and Motivated.      Functional Mobility:      Current   Status  Discharge   Goal   Functional Area: Care Score:    Sit to Lying 5  Set-up with leg , HOB flat Independent   Lying to Sitting on Side of Bed 5  Set-up with leg , HOB flat Independent   Sit to Stand 4  CGA with RW Independent   Chair/Bed-to-Chair Transfer 4  CGA with RW, stand step Independent   Walk 10 Feet 4  CGA with RW Independent   Walk 50 Feet with Two Turns 4  CGA with RW, improved quality noted. AM session: 42ft.  PM session: 75ft.  Independent       Therapeutic Activities and Exercises:  AM session:  - Toilet: SBA with RW, stand step. +void and BM, watery stool.   - SPPB outcome measure:    Balance: 2/4   Gait: 1/4 (3m in 24.99sec)  Chair rise: 0/4 (5X STS 29.36 sec with use of arms)   Overall score: 3/12 (MCID 1 point).    PM session:  - Toilet transfer, SBA with RW, stand step. 2 trials, at start and end of session. +void both times d/t lasix.   - Supine, x10 flynn: glute sets, quad sets, hip/knee flexion (self-AAROM with leg  for RLE), hip abd/add (self-AAROM with leg  for RLE)      Activity Tolerance: Good    Patient left seated in WC with Lauren OT present after AM session, supine with call button in reach after PM session.    Education provided: roles and goals of PT/PTA, gait training, balance training, safety awareness, body mechanics, assistive device, and strengthening exercises    Expected compliance: Moderate compliance    Plan:     During this hospitalization, patient to be seen 5 x/week (5-7 x/week) to address the identified rehab impairments via gait training, therapeutic activities, therapeutic exercises, neuromuscular re-education, wheelchair management/training and progress toward the following goals:    GOALS:   Multidisciplinary Problems       Physical Therapy Goals          Problem: Physical Therapy    Goal Priority  Disciplines Outcome Interventions   Physical Therapy Goal     PT, PT/OT Progressing    Description: Bed Mobility:  Roll left and right with setup/clean-up assist.   Sit to supine transfer with supervision/touching assist.   Supine to sit transfer with supervision/touching assist.     Transfers:  Sit to stand transfer with supervision/touching assist using RW.   Bed to chair transfer with supervision/touching assist Stand Step  using RW.   Car transfer with partial/moderate assist using RW.    an object from the ground in standing position with partial/moderate assist using RW.     Mobility:  Ambulate 75 feet with partial/moderate assist using RW.                         Plan of Care Expires:  11/15/24  PT Next Visit Date: 11/04/24  Plan of Care reviewed with: patient    Additional Information:         Time Tracking:     Therapy Time  PT Start Time:  (0900;1300)  PT Stop Time:  (1000;1400)   PT Individual: 120  Missed Time:    Time Missed due to:      Billable Minutes: Gait Training 30, Therapeutic Activity 60, and Therapeutic Exercise 30    10/31/2024

## 2024-10-31 NOTE — PROGRESS NOTES
[facility] - Rehab Inpatient Services  Wound Care    Patient Name:  Jerilyn Fernandez   MRN:  [id number]  Date: 10/31/2024  Diagnosis: Closed fracture of right femur    History:     No past medical history on file.    Social History     Socioeconomic History    Marital status:      Social Drivers of Health     Financial Resource Strain: Low Risk  (10/28/2024)    Overall Financial Resource Strain (CARDIA)     Difficulty of Paying Living Expenses: Not very hard   Food Insecurity: No Food Insecurity (10/28/2024)    Hunger Vital Sign     Worried About Running Out of Food in the Last Year: Never true     Ran Out of Food in the Last Year: Never true   Transportation Needs: No Transportation Needs (10/28/2024)    TRANSPORTATION NEEDS     Transportation : No   Stress: No Stress Concern Present (10/28/2024)    [facility] - Occupational Stress Questionnaire     Feeling of Stress : Only a little   Housing Stability: Low Risk  (10/28/2024)    Housing Stability Vital Sign     Unable to Pay for Housing in the Last Year: No     Homeless in the Last Year: No       Precautions:     Allergies as of 10/28/2024    (No Known Allergies)       WOC Assessment Details/Treatment     Right great toe red inflammation: trimmed toenails. Applied small [place] bandaid while leaving extra space at tip of toe to prevent pressure to tip of toe.

## 2024-10-31 NOTE — PROGRESS NOTES
[facility] ([facility])  Rehab Progress Note    Patient Name: Jerilyn Fernandez  MRN: [id number]  Age: 74 y.o. Sex: female  : 1950  Hospital Length of Stay: 3 days  Date of Service: 10/31/2024   Chief Complaint: Right femur fracture 2/2 fall s/p IM stabilization on 10/22     Subjective:     Basic Information  Admit Information: 74-year-old WF presented to [facility] on 10/21 via EMS with complaints of right-sided hip pain after slipping and falling at home.  PMH significant for hypertension and hyperlipidemia.  Workup significant for right femur fracture.  On 10/22 tolerated IM stabilization without perioperative complications.  Postoperatively reported SVT requiring esmolol then transition to normal sinus rhythm.  Initiated metoprolol tartrate 12.5 mg b.i.d. per Cardiology.  Continued with orthostatic hypotension and trauma surgery held beta-blockers and Flomax.  Required 1 unit PRBC transfusion on 10/26. Tolerated transfer to [facility] inpatient rehab unit on 10/28 without incident.   Today's Information: No acute events overnight.  Sitting in chair comfortably.  Reports slightly decreased sleep hygiene and good appetite.  Last BM 10/29.  Vital signs at goal with no recent recorded fevers.  No labs or imaging today.    Review of patient's allergies indicates:  No Known Allergies     Current Facility-Administered Medications:     acetaminophen tablet 650 mg, 650 mg, Oral, Q6H PRN, Briana Andrade, FNP, 650 mg at 10/31/24 0555    benzonatate capsule 100 mg, 100 mg, Oral, TID PRN, Briana Andrade, FNP    busPIRone tablet 5 mg, 5 mg, Oral, TID AC, Catalina Silver A, FNP, 5 mg at 10/31/24 0555    docusate sodium capsule 100 mg, 100 mg, Oral, BID, Briana Andrade, FNP, 100 mg at 10/31/24 0829    enoxaparin injection 40 mg, 40 mg, Subcutaneous, Q12H, Briana Andrade, FNP, 40 mg at 10/31/24 0829    gabapentin capsule 300 mg, 300 mg, Oral, TID ACNadeem Kathryn A, FNP, 300 mg at 10/31/24  "0555    hydrALAZINE injection 10 mg, 10 mg, Intravenous, Q4H PRN, Briana Andrade B, FNP    HYDROcodone-acetaminophen 5-325 mg per tablet 1 tablet, 1 tablet, Oral, Daily PRN, Briana Andrade, FNP    hydrOXYzine pamoate capsule 50 mg, 50 mg, Oral, Nightly PRN, Briana Andrade B, FNP, 50 mg at 10/30/24 2045    labetalol 20 mg/4 mL (5 mg/mL) IV syring, 10 mg, Intravenous, Q4H PRN, Briana Andrade B, FNP    methocarbamoL tablet 500 mg, 500 mg, Oral, Q8H, Briana Andrade B, FNP, 500 mg at 10/31/24 0556    metoprolol injection 10 mg, 10 mg, Intravenous, Q2H PRN, Briana Andrade B, FNP    nitroGLYCERIN SL tablet 0.4 mg, 0.4 mg, Sublingual, Q5 Min PRN, Briana Andrade, FNP    ondansetron disintegrating tablet 4 mg, 4 mg, Oral, Q6H PRN, Briana Andrade B, FNP    ondansetron injection 4 mg, 4 mg, Intravenous, Q8H PRN, Briana Andrade, FNP    oxyCODONE immediate release tablet 5 mg, 5 mg, Oral, Q4H PRN, Briana Andrade B, FNP, 5 mg at 10/30/24 1807    polyethylene glycol packet 17 g, 17 g, Oral, Q12H PRN, Briana Andrade B, FNP    polyvinyl alcohol-povidon(PF) 1.4-0.6 % Dpet 1 drop, 1 drop, Ophthalmic, Q4H PRN, Briana Andrade B, FNP, 1 drop at 10/31/24 0840    sodium chloride oral tablet 2,000 mg, 2,000 mg, Oral, TID, Briana Andrade B, FNP, 2,000 mg at 10/31/24 0555     Review of Systems   Complete 12-point review of symptoms negative except for what's mentioned in HPI     Objective:     /82   Pulse 83   Temp 98.9 °F (37.2 °C) (Oral)   Resp 20   Ht 5' 2" (1.575 m)   Wt 65 kg (143 lb 4.8 oz)   SpO2 96%   BMI 26.21 kg/m²      Physical Exam  Vitals reviewed.   Eyes:      Pupils: Pupils are equal, round, and reactive to light.   Cardiovascular:      Rate and Rhythm: Normal rate and regular rhythm.      Heart sounds: Normal heart sounds.   Pulmonary:      Effort: Pulmonary effort is normal.      Breath sounds: Normal breath sounds.   Abdominal:      General: Bowel sounds are normal.   Musculoskeletal:         " General: Normal range of motion.   Skin:     General: Skin is warm.      Comments: Right lower extremity incision dry and intact    Neurological:      General: No focal deficit present.      Mental Status: She is alert and oriented to person, place, and time.      Motor: Weakness present.   Psychiatric:         Mood and Affect: Mood normal.     *MD performed and documented physical examination       Lines/Drains/Airways       Peripheral Intravenous Line  Duration                  Peripheral IV - Single Lumen 10/21/24 1136 20 G Anterior;Right Forearm 9 days                    Labs  No results found for this or any previous visit (from the past 24 hours).    Radiology  Right femur x-ray 10/22/2024:  Impression:  Interval insertion of IM carina.  Suprapatellar effusion.    Assessment/Plan:     74 y.o. WF admitted on 10/28/2024     Right femur fracture 2/2 fall   - s/p IM stabilization on 10/22  - RLE WBAT, full ROM  - continue                Gabapentin 300 mg t.i.d.                Robaxin 500 mg every 8 hours  - follow-up with orthopedic surgery outpatient     Hyponatremia  - stable  - continue                Sodium chloride 2000 mg b.i.d. (decreased 10/31)     Constipation   - Stable   - Continue                 Colace 100 mg b.i.d.     Anemia  - asymptomatic  - s/p transfusion                x1 units PRBC on 10/26/2024  - H/H stable   - no evidence of active bleeds  - will closely monitor and transfuse if needed      SVT  - currently in normal sinus rhythm  - held metoprolol tartrate 12.5 mg b.i.d. 2/2 reported orthostatic hypotension  - follow-up with cardiology outpatient     HLD  - FLP outpatient  - not currently on statin     HFpEF  - EF 65% with grade 1 diastolic dysfunction  - follow-up with cardiology outpatient     VHD  - mild MR/MS/TR  - follow-up with cardiology outpatient     Major depressive disorder  - current  - continue   BuSpar 5 mg t.i.d. (initiated 10/29)    Insomnia  - current  - continue   Vistaril  50 mg at bedtime scheduled     VTE Prophylaxis:  Lovenox 40 mg b.i.d.     POA: no  Living will: no  Contacts: Brooks Fernandez (spouse) [phone]     CODE STATUS: Full  Internal Medicine (attending): Alexander Kitchen MD  Physiatry (consulting):  Isaiah Navas MD     OUTPATIENT PROVIDERS  PCP: DEX Kenny  Cardiology: Doni Barnhart MD  Orthopedic surgery: Mingo Sherman MD     DISPOSITION:  Vital signs at goal.  No labs or imaging today.  Bowel management and appetite at goal.  Initiate Vistaril 50 mg at bedtime scheduled.  Decrease sodium chloride tablets to 2000 mg b.i.d. and repeat BMP in a.m..  Continues to progress well with therapies.  Monitor closely.  Notify of any acute changes.       Briana Andrade NP conducted independent physical examination and assisted with medical documentation.     Total time spent on this encounter including chart review and direct MD + NP 1-on-1 patient interaction: 52 minutes   Over 50% of this time was spent in counseling and coordination of care

## 2024-10-31 NOTE — PLAN OF CARE
Problem: Fall Injury Risk  Goal: Absence of Fall and Fall-Related Injury  Outcome: Progressing  Intervention: Identify and Manage Contributors  Flowsheets (Taken 10/31/2024 2196)  Self-Care Promotion: independence encouraged  Medication Review/Management: medications reviewed  Intervention: Promote Injury-Free Environment  Flowsheets (Taken 10/31/2024 1518)  Safety Promotion/Fall Prevention:   Fall Risk reviewed with patient/family   instructed to call staff for mobility   nonskid shoes/socks when out of bed

## 2024-10-31 NOTE — PT/OT/SLP PROGRESS
Occupational Therapy Inpatient Rehab Treatment    Name: Jerilyn Fernandez  MRN: [id number]    Assessment:  Jerilyn Fernandez is a 74 y.o. female admitted with a medical diagnosis of Closed fracture of right femur.  She presents with the following impairments/functional limitations:  weakness, pain, impaired endurance, impaired functional mobility.    General Precautions: Standard, fall     Orthopedic Precautions:RLE weight bearing as tolerated     Braces: N/A    Rehab Prognosis: Good; patient would benefit from acute skilled OT services to address these deficits and reach maximum level of function.      History:     No past medical history on file.    Past Surgical History:   Procedure Laterality Date    RETROGRADE INTRAMEDULLARY RODDING OF DISTAL FEMUR Right 10/22/2024    Procedure: INSERTION, INTRAMEDULLARY BHARGAVI, FEMUR, DISTAL, RETROGRADE;  Surgeon: Mingo Sherman MD;  Location: [facility];  Service: Orthopedics;  Laterality: Right;  Supine, flat brigido, traction, triangles  Synthes RFNA  please make second case       Subjective     Orientation: Oriented x4    Chief Complaint: No complaints at this time. Pt is feeling great.    Patient/Family Comments/goals: To get stronger.    Respiratory Status: Room air    Patients cultural, spiritual, Anglican conflicts given the current situation: no       Objective:     Patient found up in chair with peripheral IV  upon OT entry to room.    Mobility   Patient completed:  Sit to Stand Transfer with supervision with rolling walker  Stand to Sit Transfer with supervision with rolling walker  Tub Transfer Step Transfer technique with supervision with rolling walker, TTB and leg     Functional Mobility  FM in/out bathroom with RW and supervision. No LOB noted.    ADLs   Current Status   Oral Hygiene 6 *sitting in w/c*   Shower, Bathe Self 5   Upper Body Dressing 5   Lower Body Dressing 4   Putting On, Taking Off Footwear 4     Limiting Factors for ADLs: motor, endurance, and pain      Therapeutic Exercise  Pt tolerated the following exercises sitting unsupported in wheelchair to increase BUE strength to facilitate increased independence with ADLs, functional transfers and ambulation: bicep curls into shoulder press (2x15, 2lb free weight) and shoulder punches (2x15, 2lb free weight). Rest breaks taken between sets. Exercises done unilaterally to encourage proper form with each rep.    LifeStyle Change and Education:     Patient left up in chair with call button in reach.     Education provided: Roles and goals of OT, ADLs, transfer training, bed mobility, body mechanics, assistive device, wheelchair precautions, modified goals, sequencing, safety precautions, fall prevention, equipment recommendations, and home safety    Multidisciplinary Problems       Occupational Therapy Goals          Problem: Occupational Therapy    Goal Priority Disciplines Outcome Interventions   Occupational Therapy Goal     OT, PT/OT Progressing    Description: ADLs:  Pt to perform grooming tasks with SBA standing at BS sink with LRAD  Pt to perform feeding tasks with independence   Pt to perform UB dressing with setup assist   Pt to perform LB dressing with mod A using AE as needed   Pt to perform putting on/off footwear task with max A using AE as needed  Pt to perform toileting with SBA  Pt to perform bathing with mod A using AE as needed    Functional Transfers:  Pt to perform toilet transfers with SBA and BSC over toilet   Pt to perform a tub transfer with CGA and TTB   Pt to perform a walk-in shower transfer with CGA     IADLs:  Pt to perform simple meal prep and light housekeeping with independence and LRAD                        Time Tracking     OT Received On: 10/31/24  Time In 1000     Time Out 1130  Total Time 90 min  Therapy Time: OT Individual: 90  Missed Time:    Missed Time Reason:      Billable Minutes: Self Care/Home Management 75 and Therapeutic Exercise 15    10/31/2024

## 2024-11-01 LAB
ALBUMIN SERPL-MCNC: 2.8 G/DL (ref 3.4–4.8)
ALBUMIN/GLOB SERPL: 0.8 RATIO (ref 1.1–2)
ALP SERPL-CCNC: 200 UNIT/L (ref 40–150)
ALT SERPL-CCNC: 21 UNIT/L (ref 0–55)
ANION GAP SERPL CALC-SCNC: 10 MEQ/L
AST SERPL-CCNC: 19 UNIT/L (ref 5–34)
BILIRUB SERPL-MCNC: 1.2 MG/DL
BUN SERPL-MCNC: 10.8 MG/DL (ref 9.8–20.1)
CALCIUM SERPL-MCNC: 9.1 MG/DL (ref 8.4–10.2)
CHLORIDE SERPL-SCNC: 106 MMOL/L (ref 98–107)
CO2 SERPL-SCNC: 23 MMOL/L (ref 23–31)
CREAT SERPL-MCNC: 0.74 MG/DL (ref 0.55–1.02)
CREAT/UREA NIT SERPL: 15
GFR SERPLBLD CREATININE-BSD FMLA CKD-EPI: >60 ML/MIN/1.73/M2
GLOBULIN SER-MCNC: 3.4 GM/DL (ref 2.4–3.5)
GLUCOSE SERPL-MCNC: 97 MG/DL (ref 82–115)
POTASSIUM SERPL-SCNC: 3.7 MMOL/L (ref 3.5–5.1)
PROT SERPL-MCNC: 6.2 GM/DL (ref 5.8–7.6)
SODIUM SERPL-SCNC: 139 MMOL/L (ref 136–145)

## 2024-11-01 PROCEDURE — 97110 THERAPEUTIC EXERCISES: CPT

## 2024-11-01 PROCEDURE — 97530 THERAPEUTIC ACTIVITIES: CPT

## 2024-11-01 PROCEDURE — 94799 UNLISTED PULMONARY SVC/PX: CPT

## 2024-11-01 PROCEDURE — 80053 COMPREHEN METABOLIC PANEL: CPT | Performed by: NURSE PRACTITIONER

## 2024-11-01 PROCEDURE — 25000003 PHARM REV CODE 250: Performed by: NURSE PRACTITIONER

## 2024-11-01 PROCEDURE — 11800000 HC REHAB PRIVATE ROOM

## 2024-11-01 PROCEDURE — 94761 N-INVAS EAR/PLS OXIMETRY MLT: CPT

## 2024-11-01 PROCEDURE — 36415 COLL VENOUS BLD VENIPUNCTURE: CPT | Performed by: NURSE PRACTITIONER

## 2024-11-01 PROCEDURE — 99900031 HC PATIENT EDUCATION (STAT)

## 2024-11-01 PROCEDURE — 97116 GAIT TRAINING THERAPY: CPT

## 2024-11-01 PROCEDURE — 63600175 PHARM REV CODE 636 W HCPCS: Performed by: NURSE PRACTITIONER

## 2024-11-01 PROCEDURE — 97535 SELF CARE MNGMENT TRAINING: CPT

## 2024-11-01 RX ORDER — SODIUM CHLORIDE 1 G/1
1000 TABLET ORAL 2 TIMES DAILY
Status: DISCONTINUED | OUTPATIENT
Start: 2024-11-01 | End: 2024-11-07 | Stop reason: HOSPADM

## 2024-11-01 RX ORDER — HYDROXYZINE PAMOATE 25 MG/1
25 CAPSULE ORAL NIGHTLY
Status: DISCONTINUED | OUTPATIENT
Start: 2024-11-01 | End: 2024-11-07 | Stop reason: HOSPADM

## 2024-11-01 RX ADMIN — ENOXAPARIN SODIUM 40 MG: 40 INJECTION SUBCUTANEOUS at 08:11

## 2024-11-01 RX ADMIN — DOCUSATE SODIUM 100 MG: 100 CAPSULE, LIQUID FILLED ORAL at 08:11

## 2024-11-01 RX ADMIN — HYDROXYZINE PAMOATE 25 MG: 25 CAPSULE ORAL at 09:11

## 2024-11-01 RX ADMIN — BUSPIRONE HYDROCHLORIDE 5 MG: 5 TABLET ORAL at 05:11

## 2024-11-01 RX ADMIN — GABAPENTIN 300 MG: 300 CAPSULE ORAL at 05:11

## 2024-11-01 RX ADMIN — OXYCODONE HYDROCHLORIDE 5 MG: 5 TABLET ORAL at 03:11

## 2024-11-01 RX ADMIN — GABAPENTIN 300 MG: 300 CAPSULE ORAL at 10:11

## 2024-11-01 RX ADMIN — BUSPIRONE HYDROCHLORIDE 5 MG: 5 TABLET ORAL at 10:11

## 2024-11-01 RX ADMIN — SODIUM CHLORIDE 1000 MG: 1 TABLET ORAL at 09:11

## 2024-11-01 RX ADMIN — METHOCARBAMOL 500 MG: 500 TABLET ORAL at 09:11

## 2024-11-01 RX ADMIN — OXYCODONE HYDROCHLORIDE 5 MG: 5 TABLET ORAL at 12:11

## 2024-11-01 RX ADMIN — METHOCARBAMOL 500 MG: 500 TABLET ORAL at 12:11

## 2024-11-01 RX ADMIN — GABAPENTIN 300 MG: 300 CAPSULE ORAL at 03:11

## 2024-11-01 RX ADMIN — OXYCODONE HYDROCHLORIDE 5 MG: 5 TABLET ORAL at 05:11

## 2024-11-01 RX ADMIN — ENOXAPARIN SODIUM 40 MG: 40 INJECTION SUBCUTANEOUS at 09:11

## 2024-11-01 RX ADMIN — SODIUM CHLORIDE 2000 MG: 1 TABLET ORAL at 08:11

## 2024-11-01 RX ADMIN — OXYCODONE HYDROCHLORIDE 5 MG: 5 TABLET ORAL at 09:11

## 2024-11-01 RX ADMIN — BUSPIRONE HYDROCHLORIDE 5 MG: 5 TABLET ORAL at 03:11

## 2024-11-01 RX ADMIN — METHOCARBAMOL 500 MG: 500 TABLET ORAL at 05:11

## 2024-11-01 NOTE — PT/OT/SLP PROGRESS
"Physical Therapy Inpatient Rehab Treatment    Patient Name:  Jerilyn Fernandez   MRN:  [id number]    Recommendations:     Discharge Recommendations:  Low Intensity Therapy   Discharge Equipment Recommendations:     Barriers to discharge: None    Assessment:     Jerilyn Fernandez is a 74 y.o. female admitted with a medical diagnosis of Closed fracture of right femur.  She presents with the following impairments/functional limitations:  weakness, impaired endurance, impaired functional mobility, gait instability, impaired balance, impaired cognition, decreased lower extremity function, pain, decreased ROM, edema, orthopedic precautions.    Rehab Diagnosis: Fall in shower, right femoral shaft fracture s/p IM rodding 10/22/2024. Pt. Has a history of HTN, HLD, anxiety, and migraines.    General Precautions: Standard, fall     Orthopedic Precautions:RLE weight bearing as tolerated (ROMAT)     Braces: N/A    Rehab Prognosis: Good; patient would benefit from acute skilled PT services to address these deficits and reach maximum level of function.      History:     No past medical history on file.    Past Surgical History:   Procedure Laterality Date    RETROGRADE INTRAMEDULLARY RODDING OF DISTAL FEMUR Right 10/22/2024    Procedure: INSERTION, INTRAMEDULLARY BHARGAVI, FEMUR, DISTAL, RETROGRADE;  Surgeon: Mingo Sherman MD;  Location: [facility];  Service: Orthopedics;  Laterality: Right;  Supine, flat brigido, traction, triangles  Synthes RFNA  please make second case       Subjective     Patient comments: "Today isn't going to be hard, is it?"    Respiratory Status: Room air    Patients cultural, spiritual, Zoroastrianism conflicts given the current situation: no    Objective:     Patient found up in chair with peripheral IV  upon PT entry to room.    Pt is  Anxious, Alert, and Cooperative.      Functional Mobility:      Current   Status  Discharge   Goal   Functional Area: Care Score:    Sit to Stand 5  Set-up/clean up with RW Independent " "  Chair/Bed-to-Chair Transfer 5  Set-up/clean up with RW, stand step Independent   Walk 10 Feet 4  SBA with RW Independent   Walk 50 Feet with Two Turns 4  SBA with RW.   AM session: 85ft Independent   Walk 150 Feet 4  SBA with RW  PM session: 225ft, distance increased by mental distraction via conversation with Dr. Navas.  Supervision or touching assistance       Therapeutic Activities and Exercises:  AM session:   - Toilet transfer: SBA with RW, stand step, +void.  - Standing with RW, pt performed 2x5 flynn foot taps onto 2" step.    PM session:   - Pt donned socks and shoes while seated in recliner  - Pt sitting EOB, performed:   - Self-STM to R thigh with use of rolling pin, going distal to proximal to help reduce swelling   - hip flexion, LAQ    Activity Tolerance: Fair    Patient left up in chair with  ALESSIO Aguilar present after both AM and PM sessions.    Education provided: roles and goals of PT/PTA, gait training, safety awareness, body mechanics, and strengthening exercises    Expected compliance: Moderate compliance    Plan:     During this hospitalization, patient to be seen 5 x/week (5-7 x/week) to address the identified rehab impairments via gait training, therapeutic activities, therapeutic exercises, neuromuscular re-education, wheelchair management/training and progress toward the following goals:    GOALS:   Multidisciplinary Problems       Physical Therapy Goals          Problem: Physical Therapy    Goal Priority Disciplines Outcome Interventions   Physical Therapy Goal     PT, PT/OT Progressing    Description: Bed Mobility:  Roll left and right with setup/clean-up assist.   Sit to supine transfer with supervision/touching assist.   Supine to sit transfer with supervision/touching assist.     Transfers:  Sit to stand transfer with supervision/touching assist using RW.   Bed to chair transfer with supervision/touching assist Stand Step  using RW.   Car transfer with partial/moderate assist using RW. "    an object from the ground in standing position with partial/moderate assist using RW.     Mobility:  Ambulate 75 feet with partial/moderate assist using RW.                         Plan of Care Expires:  11/15/24  PT Next Visit Date: 11/04/24  Plan of Care reviewed with: patient    Additional Information:         Time Tracking:     Therapy Time  PT Start Time:  (0900; 1300)  PT Stop Time:  (0930; 1400)   PT Individual: 90  Missed Time:    Time Missed due to:      Billable Minutes: Gait Training 15, Therapeutic Activity 45, and Therapeutic Exercise 30    11/01/2024

## 2024-11-01 NOTE — PT/OT/SLP PROGRESS
Occupational Therapy Inpatient Rehab Treatment    Name: Jerilyn Fernandez  MRN: [id number]    Assessment:  Jerilyn Fernandez is a 74 y.o. female admitted with a medical diagnosis of Closed fracture of right femur.  She presents with the following impairments/functional limitations:  weakness, impaired endurance, pain.    General Precautions: Standard, fall     Orthopedic Precautions:RLE weight bearing as tolerated     Braces: N/A    Rehab Prognosis: Good; patient would benefit from acute skilled OT services to address these deficits and reach maximum level of function.      History:     No past medical history on file.    Past Surgical History:   Procedure Laterality Date    RETROGRADE INTRAMEDULLARY RODDING OF DISTAL FEMUR Right 10/22/2024    Procedure: INSERTION, INTRAMEDULLARY BHARGAVI, FEMUR, DISTAL, RETROGRADE;  Surgeon: Mingo Sherman MD;  Location: [facility];  Service: Orthopedics;  Laterality: Right;  Supine, flat brigido, traction, triangles  Synthes RFNA  please make second case       Subjective     Orientation: Oriented x4    Chief Complaint: No complaints at this time    Respiratory Status: Room air    Patients cultural, spiritual, Catholic conflicts given the current situation: no       Objective:     Patient found up in chair with peripheral IV  upon OT entry to room.    Mobility   Patient completed:  Sit to Stand Transfer with independence with rolling walker  Stand to Sit Transfer with independence with rolling walker    Functional Mobility  Pt completed a short stand step t/f from w/c > BS recliner with RW and supervision    ADLs   Current Status   Putting On, Taking Off Footwear 6 without use of AE     Therapeutic Activities  To improve standing tolerance/endurance without use of UE support, pt tolerated standing for 10 minutes to fold laundry. No LOB noted. Seated break taken immediately following.    To increase trunk control/strength, UE endurance and reaching outside NEGRO from an unsupported sitting position,  pt tolerated 5 minutes of participating in balloon toss sitting unsupported in wheelchair. Pt tolerated very well, reaching outside NEGRO numerous times.      LifeStyle Change and Education:     Patient left up in chair with call button in reach and ice pack applied to R hip .     Education provided: Roles and goals of OT, ADLs, transfer training, body mechanics, assistive device, wheelchair precautions, modified goals, sequencing, safety precautions, fall prevention, equipment recommendations, and home safety    Multidisciplinary Problems       Occupational Therapy Goals          Problem: Occupational Therapy    Goal Priority Disciplines Outcome Interventions   Occupational Therapy Goal     OT, PT/OT Progressing    Description: ADLs:  Pt to perform grooming tasks with SBA standing at BS sink with LRAD  Pt to perform feeding tasks with independence   Pt to perform UB dressing with setup assist   Pt to perform LB dressing with mod A using AE as needed   Pt to perform putting on/off footwear task with max A using AE as needed  Pt to perform toileting with SBA  Pt to perform bathing with mod A using AE as needed    Functional Transfers:  Pt to perform toilet transfers with SBA and BSC over toilet   Pt to perform a tub transfer with CGA and TTB   Pt to perform a walk-in shower transfer with CGA     IADLs:  Pt to perform simple meal prep and light housekeeping with independence and LRAD                        Time Tracking     OT Received On: 11/01/24  Time In 1400     Time Out 1430  Total Time 30 min  Therapy Time: OT Individual: 30  Missed Time:    Missed Time Reason:      Billable Minutes: Self Care/Home Management 10 and Therapeutic Activity 20    11/01/2024

## 2024-11-01 NOTE — PROGRESS NOTES
Inpatient Nutrition Assessment    Admit Date: 10/28/2024   Total duration of encounter: 4 days   Patient Age: 74 y.o.    Nutrition Recommendation/Prescription     Continue Regular CHERELLE.  Change ONS to Boost Breeze BID per pt request (provides 250 kcal and 9 g pro per serving).  Bowel regimen per MD.  Weekly weights.  Will continue to monitor wt, labs, and po intake.    Communication of Recommendations: reviewed with patient    Nutrition Assessment     Malnutrition Assessment/Nutrition-Focused Physical Exam    Malnutrition Context: acute illness or injury (10/28/24 1540)  Malnutrition Level:  (does not meet criteria) (10/28/24 1540)  Energy Intake (Malnutrition): less than or equal to 50% for greater than or equal to 5 days (10/28/24 1540)  Weight Loss (Malnutrition):  (does not meet criteria) (10/28/24 1540)  Subcutaneous Fat (Malnutrition):  (does not meet criteria) (10/28/24 1540)           Muscle Mass (Malnutrition):  (does not meet criteria) (10/28/24 1540)                          Fluid Accumulation (Malnutrition):  (does not meet criteria) (10/28/24 1540)        A minimum of two characteristics is recommended for diagnosis of either severe or non-severe malnutrition.    Chart Review    Reason Seen: follow-up    Malnutrition Screening Tool Results   Have you recently lost weight without trying?: No  Have you been eating poorly because of a decreased appetite?: No   MST Score: 0   Diagnosis:  Closed fracture of right femur 10/21/2024      Fall 10/21/2024      Acute blood loss anemia      Relevant Medical History: HTN, HLD, anxiety and migraines     Scheduled Medications:  busPIRone, 5 mg, TID AC  docusate sodium, 100 mg, BID  enoxaparin, 40 mg, Q12H  gabapentin, 300 mg, TID AC  hydrOXYzine pamoate, 50 mg, QHS  methocarbamoL, 500 mg, Q8H  sodium chloride, 1,000 mg, BID    Continuous Infusions:   PRN Medications:  acetaminophen, 650 mg, Q6H PRN  benzonatate, 100 mg, TID PRN  hydrALAZINE, 10 mg, Q4H  PRN  HYDROcodone-acetaminophen, 1 tablet, Daily PRN  labetalol, 10 mg, Q4H PRN  metoprolol, 10 mg, Q2H PRN  nitroGLYCERIN, 0.4 mg, Q5 Min PRN  ondansetron, 4 mg, Q6H PRN  ondansetron, 4 mg, Q8H PRN  oxyCODONE, 5 mg, Q4H PRN  polyethylene glycol, 17 g, Q12H PRN  polyvinyl alcohol-povidon(PF), 1 drop, Q4H PRN    Calorie Containing IV Medications: no significant kcals from medications at this time    Recent Labs   Lab 10/26/24  0354 10/27/24  0423 10/28/24  0424 10/28/24  1109 10/29/24  0507 11/01/24  0516    135* 133*  --  137 139   K 4.3 4.5 4.1  --  4.1 3.7   CALCIUM 7.7* 8.1* 8.2*  --  8.5 9.1    104 103  --  104 106   CO2 23 21* 23  --  23 23   BUN 11.6 10.7 10.6  --  12.3 10.8   CREATININE 0.68 0.72 0.72  --  0.71 0.74   EGFRNORACEVR >60 >60 >60  --  >60 >60   GLUCOSE 101 98 114  --  96 97   BILITOT 0.6 1.1 1.0  --  1.2 1.2   ALKPHOS 148 250* 293*  --  267* 200*   ALT 15 26 43  --  40 21   AST 28 38* 61*  --  42* 19   ALBUMIN 2.2* 2.2* 2.1*  --  2.2* 2.8*   PREALB  --   --  11.2*  --  11.0*  --    CRP  --   --  141.50*  --   --   --    WBC 4.57 6.13 6.73 5.92 5.81  --    HGB 7.5* 9.2* 8.5* 8.7* 8.9*  --    HCT 21.9* 27.0* 24.9* 25.6* 26.5*  --      Nutrition Orders:  Diet Adult Regular  Dietary nutrition supplements BID; Boost Breeze - Any flavor    Appetite/Oral Intake: fair/50-75% of meals  Factors Affecting Nutritional Intake: decreased appetite  Social Needs Impacting Access to Food: none identified  Food/Buddhist/Cultural Preferences:  not a big fan of red meat  Food Allergies: no known food allergies  Last Bowel Movement: 10/31/24  Wound(s):  surgical incisions    Comments    10/28/24: Pt reports decreased appetite since her surgery (10/23). Eating between 25-50% of meals. Pt states she does not know the cause, she just isn't hungry. Pt denies issues c/s, reports nausea off and on. Denies vomiting, diarrhea, constipation. Pt denies recent unintentional wt loss. Pt agreed to trial ONS. Can  "switch to Boost Breeze if Boost Plus causes gi intolerance.    24: Pt reports her appetite and intake are beginning to improve. Stated she is just not a big eater, but the food is very good. Pt denies n/v/d/c; last BM noted yesterday. ONS switched to Boost Breeze and pt enjoying it at lunch. Pt asked for Body [employer] to be sent with meals. Will speak with dietary staff.     Anthropometrics    Height: 5' 2" (157.5 cm), Height Method: Stated  Last Weight: 65 kg (143 lb 4.8 oz) (10/28/24 1525), Weight Method: Standard Scale  BMI (Calculated): 26.2  BMI Classification: overweight (BMI 25-29.9)     Ideal Body Weight (IBW), Female: 110 lb     % Ideal Body Weight, Female (lb): 130.27 %                    Usual Body Weight (UBW), k.54 kg  % Usual Body Weight: 109.4     Usual Weight Provided By: patient    Wt Readings from Last 5 Encounters:   10/28/24 65 kg (143 lb 4.8 oz)   10/22/24 61.2 kg (134 lb 14.7 oz)     Weight Change(s) Since Admission: no new weights  Wt Readings from Last 1 Encounters:   10/28/24 1525 65 kg (143 lb 4.8 oz)   Admit Weight: 65 kg (143 lb 4.8 oz) (10/28/24 1525), Weight Method: Standard Scale    Estimated Needs    Weight Used For Calorie Calculations: 61.2 kg (134 lb 14.7 oz)  Energy Calorie Requirements (kcal): 8903-6165 kcal (30-35 kcal/kg)  Energy Need Method: Kcal/kg  Weight Used For Protein Calculations: 61.2 kg (134 lb 14.7 oz)  Protein Requirements: 74-86 g (1.2-1.4 g/kg)  Fluid Requirements (mL): 1836 mL/d (1.0 mL/kcal)        Enteral Nutrition     Patient not receiving enteral nutrition at this time.    Parenteral Nutrition     Patient not receiving parenteral nutrition support at this time.    Evaluation of Received Nutrient Intake    Calories: not meeting estimated needs  Protein: not meeting estimated needs    Patient Education     Not applicable.    Nutrition Diagnosis     PES: Inadequate oral intake related to acute illness as evidenced by 40% avg po intake x 5 meals. (active) "     Nutrition Interventions     Intervention(s): general/healthful diet, commercial beverage, multivitamin/mineral supplement therapy, prescription medication, and collaboration with other providers    Goal: Meet greater than 80% of nutritional needs by follow-up. (goal progressing)  Goal: Maintain weight throughout hospitalization. (goal progressing)    Nutrition Goals & Monitoring     Dietitian will monitor: food and beverage intake, energy intake, weight, food/nutrition knowledge skill, and gastrointestinal profile  Discharge planning: resume home regimen  Nutrition Risk/Follow-Up: moderate (follow-up in 3-5 days)   Please consult if re-assessment needed sooner.

## 2024-11-01 NOTE — PROGRESS NOTES
11/01/24 1100   Rec Therapy Time Calculation   Date of Treatment 11/01/24   Rec Start Time 1100   Rec Stop Time 1130   Rec Total Time (min) 30 min   Time   Treatment time 2 units   Charges   $Therapeutic Exercise 2 units   Precautions   General Precautions fall   Orthopedic Precautions  RLE weight bearing as tolerated   Braces N/A   Pain/Comfort   Pain Rating 1 no pain   OTHER   Rehab identified problem list/impairments weakness;impaired endurance;impaired functional mobility;gait instability;decreased lower extremity function   Values/Beliefs/Spiritual Care   Spiritual, Cultural Beliefs, Jain Practices, Values that Affect Care no   Overall Level of Functioning   Activity Tolerance Independent   Dynamic Sitting Balance/Reaching Independent   Dynamic Standing Balance/Reaching Mod Indep   Right UE Coodination/Dexterity Independent   Left UE Coordination/Dexterity Independent   Problem Solving/Sequencing Skills Independent   Memory Recall Independent   R/L Neglect/Inattention Does not occur   Attention Span Independent   Social Interaction Independent   Recreational Therapy Short Term Goals   Short Term Goal 1 Progression Met   Short Term Goal 2 Progression Met   Recreational Therapy Long Term Goals   Long Term Goal 1 Progression Progressing   Long Term Goal 2 Progression Progressing   Plan   Patient to be seen Daily   Planned Duration 1 week   Treatments Planned Energy conservation training   Treatment plan/goals estblished with Patient/Caregiver Yes

## 2024-11-01 NOTE — PT/OT/SLP PROGRESS
Recreational Therapy Treatment    Date of Treatment: 11/01/24  Start Time: 1100  Stop Time: 1130  Total Time: 30 min  Missed Time:     General Precautions: fall  Ortho Precautions: RLE weight bearing as tolerated  Braces: N/A    Vitals   Vitals at Rest  BP    HR    O2 Sat    Pain      Vitals With Activity  BP    HR    O2 Sat    Pain        Treatment     Cognitive Skills Building   Cognitive Observation Activity Assist Position Equipment Response            Comment:          Dynamic Activities   Activity Assist Position Equipment Response   Activity 1 Bowling modified independence Standing Rolling walker and Rubber bowling balls good   Comment: Sit to stand was setup as was dynamic standing balance/reaching.  Standing tolerance was 3 minutes with sitting rest breaks.  UE coordination was I as were sequencing and problem solving skills.  She said she use to bowl years ago and enjoyed it .         Fine Motor Activities   Activity Assist Position Equipment Response           Comment:          Additional Info: Toilet to w/c transfer was setup     Goals     Short Term Goals    Goal  Goal Status   Will increase sit to stand to supervision Met   Will improve dynamic standing balance/reaching to supervision Met                 Long Term Goals    Goal Goal Status   Will increase standing tolerance to 5,10 minutes Progressing   Will improve dynamic standing balance/reaching to setup/I Progressing

## 2024-11-01 NOTE — PT/OT/SLP PROGRESS
"Occupational Therapy Inpatient Rehab Treatment    Name: Jerilyn Fernandez  MRN: [id number]    Assessment:  Jerilyn Fernandez is a 74 y.o. female admitted with a medical diagnosis of Closed fracture of right femur.  She presents with the following impairments/functional limitations:  impaired endurance, impaired functional mobility, pain.    General Precautions: Standard, fall     Orthopedic Precautions:RLE weight bearing as tolerated     Braces: N/A    Rehab Prognosis: Good; patient would benefit from acute skilled OT services to address these deficits and reach maximum level of function.      History:     No past medical history on file.    Past Surgical History:   Procedure Laterality Date    RETROGRADE INTRAMEDULLARY RODDING OF DISTAL FEMUR Right 10/22/2024    Procedure: INSERTION, INTRAMEDULLARY BHARGAVI, FEMUR, DISTAL, RETROGRADE;  Surgeon: Mingo Sherman MD;  Location: [facility];  Service: Orthopedics;  Laterality: Right;  Supine, flat brigido, traction, triangles  Synthes RFNA  please make second case       Subjective     Orientation: Oriented x4    Chief Complaint: "I'm feeling really good today."    Patient/Family Comments/goals: To get stronger and more independent    Respiratory Status: Room air    Patients cultural, spiritual, Orthodox conflicts given the current situation: no       Objective:     Patient found up in chair with peripheral IV  upon OT entry to room.    Mobility   Patient completed:  Sit to Stand Transfer with independence with rolling walker  Stand to Sit Transfer with independence with rolling walker    ADLs   Current Status   Oral Hygiene 4 standing with RW and bedside sink     Therapeutic Activities  Pt ambulated throughout OT kitchen with RW and supervision to find 15 rings scattered at various areas to facilitate functional reaching, increase standing tolerance/endurance, and encourage reaching outside base of support/dynamic standing. Pt tolerated activity very well and did not demo any LOBs    Pt " ambulated ~90' in therapy hallway to retrieve 12 rings placed on floor with use of reacher. Pt able to complete activity with independence. No loss of balance noted throughout.    Therapeutic Exercise  To increase activity tolerance/endurance and BUE endurance, pt tolerated two 5-minute trials (trial 1 forward, trial 2 backward) of propelling UE ergometer at level 2 resistance sitting unsupported in wheelchair. Prolonged rest break taken following each trial.    LifeStyle Change and Education:     Patient left up in chair with call button in reach and ice pack applied to RLE .     Education provided: Roles and goals of OT, ADLs, transfer training, body mechanics, assistive device, wheelchair precautions, modified goals, sequencing, safety precautions, fall prevention, equipment recommendations, and home safety    Multidisciplinary Problems       Occupational Therapy Goals          Problem: Occupational Therapy    Goal Priority Disciplines Outcome Interventions   Occupational Therapy Goal     OT, PT/OT Progressing    Description: ADLs:  Pt to perform grooming tasks with SBA standing at BS sink with LRAD  Pt to perform feeding tasks with independence   Pt to perform UB dressing with setup assist   Pt to perform LB dressing with mod A using AE as needed   Pt to perform putting on/off footwear task with max A using AE as needed  Pt to perform toileting with SBA  Pt to perform bathing with mod A using AE as needed    Functional Transfers:  Pt to perform toilet transfers with SBA and BSC over toilet   Pt to perform a tub transfer with CGA and TTB   Pt to perform a walk-in shower transfer with CGA     IADLs:  Pt to perform simple meal prep and light housekeeping with independence and LRAD                        Time Tracking     OT Received On: 11/01/24  Time In 0930     Time Out 1030  Total Time 60 min  Therapy Time: OT Individual: 60  Missed Time:    Missed Time Reason:      Billable Minutes: Self Care/Home Management  15, Therapeutic Activity 30, and Therapeutic Exercise 15    11/01/2024

## 2024-11-01 NOTE — PLAN OF CARE
Problem: Skin Injury Risk Increased  Goal: Skin Health and Integrity  Outcome: Progressing     Problem: Fall Injury Risk  Goal: Absence of Fall and Fall-Related Injury  Outcome: Progressing     Problem: Wound  Goal: Optimal Wound Healing  Outcome: Progressing     Problem: Wound  Goal: Optimal Pain Control and Function  Outcome: Progressing     Problem: Wound  Goal: Absence of Infection Signs and Symptoms  Outcome: Progressing     Problem: Wound  Goal: Optimal Functional Ability  Outcome: Progressing

## 2024-11-01 NOTE — PROGRESS NOTES
[facility] ([facility])  Rehab Progress Note    Patient Name: Jerilyn Fernandez  MRN: [id number]  Age: 74 y.o. Sex: female  : 1950  Hospital Length of Stay: 4 days  Date of Service: 2024   Chief Complaint: Right femur fracture 2/2 fall s/p IM stabilization on 10/22     Subjective:     Basic Information  Admit Information: 74-year-old WF presented to [facility] on 10/21 via EMS with complaints of right-sided hip pain after slipping and falling at home.  PMH significant for hypertension and hyperlipidemia.  Workup significant for right femur fracture.  On 10/22 tolerated IM stabilization without perioperative complications.  Postoperatively reported SVT requiring esmolol then transition to normal sinus rhythm.  Initiated metoprolol tartrate 12.5 mg b.i.d. per Cardiology.  Continued with orthostatic hypotension and trauma surgery held beta-blockers and Flomax.  Required 1 unit PRBC transfusion on 10/26. Tolerated transfer to [facility] inpatient rehab unit on 10/28 without incident.   Today's Information: No acute events overnight.  Sitting in chair comfortably.  Reports good sleep and appetite.  Right lower extremity edema has improved.  Vital signs at goal with no recent recorded fevers.  Labs reviewed, CMP overall unremarkable.  Albumin trending up with improvement.  No imaging today.    Review of patient's allergies indicates:  No Known Allergies     Current Facility-Administered Medications:     acetaminophen tablet 650 mg, 650 mg, Oral, Q6H PRN, Briana Andrade, FNP, 650 mg at 10/31/24 0555    benzonatate capsule 100 mg, 100 mg, Oral, TID PRN, Briana Andrade, ELAYNEP    busPIRone tablet 5 mg, 5 mg, Oral, TID ACNadeem Kathryn A, FNP, 5 mg at 24 1054    docusate sodium capsule 100 mg, 100 mg, Oral, BID, Briana Andrade, FNP, 100 mg at 24 0855    enoxaparin injection 40 mg, 40 mg, Subcutaneous, Q12H, Briana Andrade, FNP, 40 mg at 24 0856    gabapentin capsule 300  "mg, 300 mg, Oral, TID ACNadeem Catalina FLACA, FNP, 300 mg at 11/01/24 1054    hydrALAZINE injection 10 mg, 10 mg, Intravenous, Q4H PRN, Briana Andrade, FNP    HYDROcodone-acetaminophen 5-325 mg per tablet 1 tablet, 1 tablet, Oral, Daily PRN, Briana Andrade, FNP    hydrOXYzine pamoate capsule 50 mg, 50 mg, Oral, QHS, Briana Andrade, FNP, 50 mg at 10/31/24 2100    labetalol 20 mg/4 mL (5 mg/mL) IV syring, 10 mg, Intravenous, Q4H PRN, Briana Andrade, FNP    methocarbamoL tablet 500 mg, 500 mg, Oral, Q8H, Briana Andrade, FNP, 500 mg at 11/01/24 0548    metoprolol injection 10 mg, 10 mg, Intravenous, Q2H PRN, Briana Andrade, FNP    nitroGLYCERIN SL tablet 0.4 mg, 0.4 mg, Sublingual, Q5 Min PRN, Briana Andrade, FNP    ondansetron disintegrating tablet 4 mg, 4 mg, Oral, Q6H PRN, Briana Anrdade, FNP    ondansetron injection 4 mg, 4 mg, Intravenous, Q8H PRN, Briana Andrade, FNP    oxyCODONE immediate release tablet 5 mg, 5 mg, Oral, Q4H PRN, Briana Andrade, FNP, 5 mg at 11/01/24 0548    polyethylene glycol packet 17 g, 17 g, Oral, Q12H PRN, Briana Andrade B, FNP    polyvinyl alcohol-povidon(PF) 1.4-0.6 % Dpet 1 drop, 1 drop, Ophthalmic, Q4H PRN, Briana Andrade, FNP, 1 drop at 10/31/24 0840    sodium chloride oral tablet 2,000 mg, 2,000 mg, Oral, BID, Briana Andrade, FNP, 2,000 mg at 11/01/24 0856     Review of Systems   Complete 12-point review of symptoms negative except for what's mentioned in HPI     Objective:     /73   Pulse 83   Temp 98.8 °F (37.1 °C) (Oral)   Resp 18   Ht 5' 2" (1.575 m)   Wt 65 kg (143 lb 4.8 oz)   SpO2 (!) 92%   BMI 26.21 kg/m²      Physical Exam  Vitals reviewed.   Eyes:      Pupils: Pupils are equal, round, and reactive to light.   Cardiovascular:      Rate and Rhythm: Normal rate and regular rhythm.      Heart sounds: Normal heart sounds.   Pulmonary:      Effort: Pulmonary effort is normal.      Breath sounds: Normal breath sounds.   Abdominal:    "   General: Bowel sounds are normal.   Musculoskeletal:         General: Normal range of motion.   Skin:     General: Skin is warm.      Comments: Right lower extremity incision dry and intact    Neurological:      General: No focal deficit present.      Mental Status: She is alert and oriented to person, place, and time.      Motor: Weakness present.   Psychiatric:         Mood and Affect: Mood normal.     *MD performed and documented physical examination       Lines/Drains/Airways       Peripheral Intravenous Line  Duration                  Peripheral IV - Single Lumen 10/21/24 1136 20 G Anterior;Right Forearm 11 days                    Labs  Recent Results (from the past 24 hours)   Comprehensive Metabolic Panel    Collection Time: 11/01/24  5:16 AM   Result Value Ref Range    Sodium 139 136 - 145 mmol/L    Potassium 3.7 3.5 - 5.1 mmol/L    Chloride 106 98 - 107 mmol/L    CO2 23 23 - 31 mmol/L    Glucose 97 82 - 115 mg/dL    Blood Urea Nitrogen 10.8 9.8 - 20.1 mg/dL    Creatinine 0.74 0.55 - 1.02 mg/dL    Calcium 9.1 8.4 - 10.2 mg/dL    Protein Total 6.2 5.8 - 7.6 gm/dL    Albumin 2.8 (L) 3.4 - 4.8 g/dL    Globulin 3.4 2.4 - 3.5 gm/dL    Albumin/Globulin Ratio 0.8 (L) 1.1 - 2.0 ratio    Bilirubin Total 1.2 <=1.5 mg/dL     (H) 40 - 150 unit/L    ALT 21 0 - 55 unit/L    AST 19 5 - 34 unit/L    eGFR >60 mL/min/1.73/m2    Anion Gap 10.0 mEq/L    BUN/Creatinine Ratio 15        Radiology  Right femur x-ray 10/22/2024:  Impression:  Interval insertion of IM carina.  Suprapatellar effusion.    Assessment/Plan:     74 y.o. WF admitted on 10/28/2024     Right femur fracture 2/2 fall   - s/p IM stabilization on 10/22  - RLE WBAT, full ROM  - continue                Gabapentin 300 mg t.i.d.                Robaxin 500 mg every 8 hours  - follow-up with orthopedic surgery outpatient     Hyponatremia  - stable  - continue                Sodium chloride 1000 mg b.i.d. (decreased 11/1)     Constipation   - Stable   - Continue                  Colace 100 mg b.i.d.     Anemia  - asymptomatic  - s/p transfusion                x1 units PRBC on 10/26/2024  - H/H stable   - no evidence of active bleeds  - will closely monitor and transfuse if needed      SVT  - currently in normal sinus rhythm  - held metoprolol tartrate 12.5 mg b.i.d. 2/2 reported orthostatic hypotension  - follow-up with cardiology outpatient     HLD  - FLP outpatient  - not currently on statin     HFpEF  - EF 65% with grade 1 diastolic dysfunction  - follow-up with cardiology outpatient     VHD  - mild MR/MS/TR  - follow-up with cardiology outpatient     Major depressive disorder  - current  - continue   BuSpar 5 mg t.i.d. (initiated 10/29)    Insomnia  - current  - continue   Vistaril 50 mg at bedtime scheduled     VTE Prophylaxis:  Lovenox 40 mg b.i.d.     POA: no  Living will: no  Contacts: Brooks Fernandez (spouse) [phone]     CODE STATUS: Full  Internal Medicine (attending): Alexander Kitchen MD  Physiatry (consulting):  Isaiah Navas MD     OUTPATIENT PROVIDERS  PCP: DEX Kenny  Cardiology: Doni Barnhart MD  Orthopedic surgery: Mingo Sherman MD     DISPOSITION:  Vital signs at goal.  Labs reviewed.  Albumin trending up with improvement.  Hyponatremia resolved.  Decrease sodium chloride tablets to 1000 mg b.i.d..  Bowel management, sleep hygiene, and appetite at goal.  Right lower extremity edema has improved s/p Lasix 40 mg IVP given on 10/31.  Continues to progress well with therapies.  Monitor closely.  Notify of any acute changes.       Briana Andrade NP conducted independent physical examination and assisted with medical documentation.     Total time spent on this encounter including chart review and direct MD + NP 1-on-1 patient interaction: 51 minutes   Over 50% of this time was spent in counseling and coordination of care

## 2024-11-02 PROCEDURE — 99900031 HC PATIENT EDUCATION (STAT)

## 2024-11-02 PROCEDURE — 94799 UNLISTED PULMONARY SVC/PX: CPT

## 2024-11-02 PROCEDURE — 25000003 PHARM REV CODE 250: Performed by: NURSE PRACTITIONER

## 2024-11-02 PROCEDURE — 94761 N-INVAS EAR/PLS OXIMETRY MLT: CPT

## 2024-11-02 PROCEDURE — 63600175 PHARM REV CODE 636 W HCPCS: Performed by: NURSE PRACTITIONER

## 2024-11-02 PROCEDURE — 11800000 HC REHAB PRIVATE ROOM

## 2024-11-02 RX ADMIN — DOCUSATE SODIUM 100 MG: 100 CAPSULE, LIQUID FILLED ORAL at 08:11

## 2024-11-02 RX ADMIN — GABAPENTIN 300 MG: 300 CAPSULE ORAL at 05:11

## 2024-11-02 RX ADMIN — BUSPIRONE HYDROCHLORIDE 5 MG: 5 TABLET ORAL at 05:11

## 2024-11-02 RX ADMIN — GABAPENTIN 300 MG: 300 CAPSULE ORAL at 11:11

## 2024-11-02 RX ADMIN — ENOXAPARIN SODIUM 40 MG: 40 INJECTION SUBCUTANEOUS at 09:11

## 2024-11-02 RX ADMIN — OXYCODONE HYDROCHLORIDE 5 MG: 5 TABLET ORAL at 08:11

## 2024-11-02 RX ADMIN — SODIUM CHLORIDE 1000 MG: 1 TABLET ORAL at 09:11

## 2024-11-02 RX ADMIN — BUSPIRONE HYDROCHLORIDE 5 MG: 5 TABLET ORAL at 11:11

## 2024-11-02 RX ADMIN — POLYVINYL ALCOHOL, POVIDONE 1 DROP: 14; 6 SOLUTION/ DROPS OPHTHALMIC at 09:11

## 2024-11-02 RX ADMIN — METHOCARBAMOL 500 MG: 500 TABLET ORAL at 01:11

## 2024-11-02 RX ADMIN — POLYVINYL ALCOHOL, POVIDONE 1 DROP: 14; 6 SOLUTION/ DROPS OPHTHALMIC at 08:11

## 2024-11-02 RX ADMIN — METHOCARBAMOL 500 MG: 500 TABLET ORAL at 09:11

## 2024-11-02 RX ADMIN — OXYCODONE HYDROCHLORIDE 5 MG: 5 TABLET ORAL at 01:11

## 2024-11-02 RX ADMIN — METHOCARBAMOL 500 MG: 500 TABLET ORAL at 05:11

## 2024-11-02 RX ADMIN — SODIUM CHLORIDE 1000 MG: 1 TABLET ORAL at 08:11

## 2024-11-02 RX ADMIN — HYDROXYZINE PAMOATE 25 MG: 25 CAPSULE ORAL at 09:11

## 2024-11-02 RX ADMIN — OXYCODONE HYDROCHLORIDE 5 MG: 5 TABLET ORAL at 09:11

## 2024-11-02 RX ADMIN — DOCUSATE SODIUM 100 MG: 100 CAPSULE, LIQUID FILLED ORAL at 09:11

## 2024-11-02 RX ADMIN — ENOXAPARIN SODIUM 40 MG: 40 INJECTION SUBCUTANEOUS at 08:11

## 2024-11-02 NOTE — PROGRESS NOTES
[facility] ([facility])  Rehab Progress Note    Patient Name: Jerilyn Fernandez  MRN: [id number]  Age: 74 y.o. Sex: female  : 1950  Hospital Length of Stay: 5 days  Date of Service: 2024   Chief Complaint: Right femur fracture 2/2 fall s/p IM stabilization on 10/22     Subjective:     Basic Information  Admit Information: 74-year-old WF presented to [facility] on 10/21 via EMS with complaints of right-sided hip pain after slipping and falling at home.  PMH significant for hypertension and hyperlipidemia.  Workup significant for right femur fracture.  On 10/22 tolerated IM stabilization without perioperative complications.  Postoperatively reported SVT requiring esmolol then transition to normal sinus rhythm.  Initiated metoprolol tartrate 12.5 mg b.i.d. per Cardiology.  Continued with orthostatic hypotension and trauma surgery held beta-blockers and Flomax.  Required 1 unit PRBC transfusion on 10/26. Tolerated transfer to [facility] inpatient rehab unit on 10/28 without incident.   Today's Information: No acute events overnight.  Resting in bed.  Reports good sleep and appetite.  Last BM .  Vital signs at goal with no recorded fevers.  No new labs or imaging today.    Review of patient's allergies indicates:  No Known Allergies     Current Facility-Administered Medications:     acetaminophen tablet 650 mg, 650 mg, Oral, Q6H PRN, Briana Andrade, FNP, 650 mg at 10/31/24 0555    benzonatate capsule 100 mg, 100 mg, Oral, TID PRN, Briana Andrade, FNP    busPIRone tablet 5 mg, 5 mg, Oral, TID ACNadeem Kathryn A, FNP, 5 mg at 24 1555    docusate sodium capsule 100 mg, 100 mg, Oral, BID, Briana Andrade, FNP, 100 mg at 24 0855    enoxaparin injection 40 mg, 40 mg, Subcutaneous, Q12H, Briana Andrade, FNP, 40 mg at 24 2121    gabapentin capsule 300 mg, 300 mg, Oral, TID ACNadeem Kathryn A, FNP, 300 mg at 24 1555    hydrALAZINE injection 10 mg, 10 mg,  "Intravenous, Q4H PRN, Briana Andrade, FNP    HYDROcodone-acetaminophen 5-325 mg per tablet 1 tablet, 1 tablet, Oral, Daily PRN, Briana Andrade, FNP    hydrOXYzine pamoate capsule 25 mg, 25 mg, Oral, QHS, Briana Andrade B, FNP, 25 mg at 11/01/24 2121    labetalol 20 mg/4 mL (5 mg/mL) IV syring, 10 mg, Intravenous, Q4H PRN, Briana Andrade, FNP    methocarbamoL tablet 500 mg, 500 mg, Oral, Q8H, Briana Andrade, FNP, 500 mg at 11/01/24 2121    metoprolol injection 10 mg, 10 mg, Intravenous, Q2H PRN, Briana Andrade, FNP    nitroGLYCERIN SL tablet 0.4 mg, 0.4 mg, Sublingual, Q5 Min PRN, Briana Andrade, FNP    ondansetron disintegrating tablet 4 mg, 4 mg, Oral, Q6H PRN, Briana Andrade, FNP    ondansetron injection 4 mg, 4 mg, Intravenous, Q8H PRN, Briana Andrade, FNP    oxyCODONE immediate release tablet 5 mg, 5 mg, Oral, Q4H PRN, Briana Andrade, FNP, 5 mg at 11/01/24 2121    polyethylene glycol packet 17 g, 17 g, Oral, Q12H PRN, Briana Andrade B, FNP    polyvinyl alcohol-povidon(PF) 1.4-0.6 % Dpet 1 drop, 1 drop, Ophthalmic, Q4H PRN, Briana Andrade B, FNP, 1 drop at 10/31/24 0840    sodium chloride oral tablet 1,000 mg, 1,000 mg, Oral, BID, Briana Andrade, FNP, 1,000 mg at 11/01/24 2121     Review of Systems   Complete 12-point review of symptoms negative except for what's mentioned in HPI     Objective:     /77   Pulse 94   Temp 98.8 °F (37.1 °C) (Oral)   Resp 18   Ht 5' 2" (1.575 m)   Wt 65 kg (143 lb 4.8 oz)   SpO2 96%   BMI 26.21 kg/m²      Physical Exam  Vitals reviewed.   Eyes:      Pupils: Pupils are equal, round, and reactive to light.   Cardiovascular:      Rate and Rhythm: Normal rate and regular rhythm.      Heart sounds: Normal heart sounds.   Pulmonary:      Effort: Pulmonary effort is normal.      Breath sounds: Normal breath sounds.   Abdominal:      General: Bowel sounds are normal.   Musculoskeletal:         General: Normal range of motion.   Skin:     General: " Skin is warm.      Comments: Right lower extremity incision dry and intact    Neurological:      General: No focal deficit present.      Mental Status: She is alert and oriented to person, place, and time.      Motor: Weakness present.   Psychiatric:         Mood and Affect: Mood normal.       Lines/Drains/Airways       Peripheral Intravenous Line  Duration                  Peripheral IV - Single Lumen 10/21/24 1136 20 G Anterior;Right Forearm 11 days                    Labs  Recent Results (from the past 24 hours)   Comprehensive Metabolic Panel    Collection Time: 11/01/24  5:16 AM   Result Value Ref Range    Sodium 139 136 - 145 mmol/L    Potassium 3.7 3.5 - 5.1 mmol/L    Chloride 106 98 - 107 mmol/L    CO2 23 23 - 31 mmol/L    Glucose 97 82 - 115 mg/dL    Blood Urea Nitrogen 10.8 9.8 - 20.1 mg/dL    Creatinine 0.74 0.55 - 1.02 mg/dL    Calcium 9.1 8.4 - 10.2 mg/dL    Protein Total 6.2 5.8 - 7.6 gm/dL    Albumin 2.8 (L) 3.4 - 4.8 g/dL    Globulin 3.4 2.4 - 3.5 gm/dL    Albumin/Globulin Ratio 0.8 (L) 1.1 - 2.0 ratio    Bilirubin Total 1.2 <=1.5 mg/dL     (H) 40 - 150 unit/L    ALT 21 0 - 55 unit/L    AST 19 5 - 34 unit/L    eGFR >60 mL/min/1.73/m2    Anion Gap 10.0 mEq/L    BUN/Creatinine Ratio 15        Radiology  Right femur x-ray 10/22/2024:  Impression:  Interval insertion of IM carina.  Suprapatellar effusion.    Assessment/Plan:     74 y.o. WF admitted on 10/28/2024     Right femur fracture 2/2 fall   - s/p IM stabilization on 10/22  - RLE WBAT, full ROM  - continue                Gabapentin 300 mg t.i.d.                Robaxin 500 mg every 8 hours  - follow-up with orthopedic surgery outpatient     Hyponatremia  - stable  - continue                Sodium chloride 1000 mg b.i.d. (decreased 11/1)     Constipation   - Stable   - Continue                 Colace 100 mg b.i.d.     Anemia  - asymptomatic  - s/p transfusion                x1 units PRBC on 10/26/2024  - H/H stable   - no evidence of active  bleeds  - will closely monitor and transfuse if needed      SVT  - currently in normal sinus rhythm  - held metoprolol tartrate 12.5 mg b.i.d. 2/2 reported orthostatic hypotension  - follow-up with cardiology outpatient     HLD  - FLP outpatient  - not currently on statin     HFpEF  - EF 65% with grade 1 diastolic dysfunction  - follow-up with cardiology outpatient     VHD  - mild MR/MS/TR  - follow-up with cardiology outpatient     Major depressive disorder  - current  - continue   BuSpar 5 mg t.i.d. (initiated 10/29)    Insomnia  - current  - continue   Vistaril 50 mg at bedtime scheduled     VTE Prophylaxis:  Lovenox 40 mg b.i.d.     POA: no  Living will: no  Contacts: Brooks Fernandez (spouse) [phone]     CODE STATUS: Full  Internal Medicine (attending): Alexander Kitchen MD  Physiatry (consulting):  Isaiah Navas MD     OUTPATIENT PROVIDERS  PCP: DEX Kenny  Cardiology: Doni Barnhart MD  Orthopedic surgery: Mingo Sherman MD     DISPOSITION:  Sleep hygiene, bowel maintenance, and appetite at goal.  Last BM 11/1.  Vital signs at goal with no recorded fevers.  No new labs or imaging today.  Continue current POC.  Monitor closely.  Notify of acute changes.

## 2024-11-02 NOTE — PLAN OF CARE
Problem: Skin Injury Risk Increased  Goal: Skin Health and Integrity  Outcome: Progressing     Problem: Fall Injury Risk  Goal: Absence of Fall and Fall-Related Injury  Outcome: Progressing     Problem: Wound  Goal: Absence of Infection Signs and Symptoms  Outcome: Progressing     Problem: Wound  Goal: Optimal Functional Ability  Outcome: Progressing     Problem: Wound  Goal: Optimal Pain Control and Function  Outcome: Progressing     Problem: Rehabilitation (IRF) Plan of Care  Goal: Absence of New-Onset Illness or Injury  Outcome: Progressing

## 2024-11-03 PROCEDURE — 97530 THERAPEUTIC ACTIVITIES: CPT

## 2024-11-03 PROCEDURE — 97116 GAIT TRAINING THERAPY: CPT

## 2024-11-03 PROCEDURE — 25000003 PHARM REV CODE 250: Performed by: NURSE PRACTITIONER

## 2024-11-03 PROCEDURE — 63600175 PHARM REV CODE 636 W HCPCS: Performed by: NURSE PRACTITIONER

## 2024-11-03 PROCEDURE — 97110 THERAPEUTIC EXERCISES: CPT

## 2024-11-03 PROCEDURE — 97535 SELF CARE MNGMENT TRAINING: CPT | Mod: CO

## 2024-11-03 PROCEDURE — 94799 UNLISTED PULMONARY SVC/PX: CPT

## 2024-11-03 PROCEDURE — 99900031 HC PATIENT EDUCATION (STAT)

## 2024-11-03 PROCEDURE — 99900035 HC TECH TIME PER 15 MIN (STAT)

## 2024-11-03 PROCEDURE — 11800000 HC REHAB PRIVATE ROOM

## 2024-11-03 PROCEDURE — 94761 N-INVAS EAR/PLS OXIMETRY MLT: CPT

## 2024-11-03 RX ORDER — POTASSIUM CHLORIDE 750 MG/1
30 TABLET, EXTENDED RELEASE ORAL 2 TIMES DAILY
Status: COMPLETED | OUTPATIENT
Start: 2024-11-03 | End: 2024-11-03

## 2024-11-03 RX ORDER — FUROSEMIDE 10 MG/ML
40 INJECTION INTRAMUSCULAR; INTRAVENOUS ONCE
Status: COMPLETED | OUTPATIENT
Start: 2024-11-03 | End: 2024-11-03

## 2024-11-03 RX ADMIN — METHOCARBAMOL 500 MG: 500 TABLET ORAL at 02:11

## 2024-11-03 RX ADMIN — FUROSEMIDE 40 MG: 10 INJECTION, SOLUTION INTRAMUSCULAR; INTRAVENOUS at 09:11

## 2024-11-03 RX ADMIN — ENOXAPARIN SODIUM 40 MG: 40 INJECTION SUBCUTANEOUS at 09:11

## 2024-11-03 RX ADMIN — OXYCODONE HYDROCHLORIDE 5 MG: 5 TABLET ORAL at 09:11

## 2024-11-03 RX ADMIN — POLYVINYL ALCOHOL, POVIDONE 1 DROP: 14; 6 SOLUTION/ DROPS OPHTHALMIC at 04:11

## 2024-11-03 RX ADMIN — POTASSIUM CHLORIDE 30 MEQ: 750 TABLET, EXTENDED RELEASE ORAL at 09:11

## 2024-11-03 RX ADMIN — OXYCODONE HYDROCHLORIDE 5 MG: 5 TABLET ORAL at 02:11

## 2024-11-03 RX ADMIN — HYDROXYZINE PAMOATE 25 MG: 25 CAPSULE ORAL at 09:11

## 2024-11-03 RX ADMIN — ENOXAPARIN SODIUM 40 MG: 40 INJECTION SUBCUTANEOUS at 07:11

## 2024-11-03 RX ADMIN — DOCUSATE SODIUM 100 MG: 100 CAPSULE, LIQUID FILLED ORAL at 09:11

## 2024-11-03 RX ADMIN — BUSPIRONE HYDROCHLORIDE 5 MG: 5 TABLET ORAL at 04:11

## 2024-11-03 RX ADMIN — OXYCODONE HYDROCHLORIDE 5 MG: 5 TABLET ORAL at 05:11

## 2024-11-03 RX ADMIN — BUSPIRONE HYDROCHLORIDE 5 MG: 5 TABLET ORAL at 05:11

## 2024-11-03 RX ADMIN — GABAPENTIN 300 MG: 300 CAPSULE ORAL at 04:11

## 2024-11-03 RX ADMIN — POLYVINYL ALCOHOL, POVIDONE 1 DROP: 14; 6 SOLUTION/ DROPS OPHTHALMIC at 09:11

## 2024-11-03 RX ADMIN — GABAPENTIN 300 MG: 300 CAPSULE ORAL at 05:11

## 2024-11-03 RX ADMIN — BUSPIRONE HYDROCHLORIDE 5 MG: 5 TABLET ORAL at 11:11

## 2024-11-03 RX ADMIN — GABAPENTIN 300 MG: 300 CAPSULE ORAL at 11:11

## 2024-11-03 RX ADMIN — METHOCARBAMOL 500 MG: 500 TABLET ORAL at 09:11

## 2024-11-03 RX ADMIN — SODIUM CHLORIDE 1000 MG: 1 TABLET ORAL at 09:11

## 2024-11-03 RX ADMIN — SODIUM CHLORIDE 1000 MG: 1 TABLET ORAL at 07:11

## 2024-11-03 RX ADMIN — METHOCARBAMOL 500 MG: 500 TABLET ORAL at 05:11

## 2024-11-03 NOTE — PROGRESS NOTES
[facility] ([facility])  Rehab Progress Note    Patient Name: Jerilyn Fernandez  MRN: [id number]  Age: 74 y.o. Sex: female  : 1950  Hospital Length of Stay: 6 days  Date of Service: 11/3/2024   Chief Complaint: Right femur fracture 2/2 fall s/p IM stabilization on 10/22     Subjective:     Basic Information  Admit Information: 74-year-old WF presented to [facility] on 10/21 via EMS with complaints of right-sided hip pain after slipping and falling at home.  PMH significant for hypertension and hyperlipidemia.  Workup significant for right femur fracture.  On 10/22 tolerated IM stabilization without perioperative complications.  Postoperatively reported SVT requiring esmolol then transition to normal sinus rhythm.  Initiated metoprolol tartrate 12.5 mg b.i.d. per Cardiology.  Continued with orthostatic hypotension and trauma surgery held beta-blockers and Flomax.  Required 1 unit PRBC transfusion on 10/26. Tolerated transfer to [facility] inpatient rehab unit on 10/28 without incident.   Today's Information: No acute events overnight.  Resting in bed.  Reports good sleep and appetite.  Last BM .  Vital signs at goal with no recorded fevers.  No new labs or imaging today.    Review of patient's allergies indicates:  No Known Allergies     Current Facility-Administered Medications:     acetaminophen tablet 650 mg, 650 mg, Oral, Q6H PRN, Briana Andrade, FNP, 650 mg at 10/31/24 0555    benzonatate capsule 100 mg, 100 mg, Oral, TID PRN, Briana Andrade, FNP    busPIRone tablet 5 mg, 5 mg, Oral, TID AC, Catalina Silver A, FNP, 5 mg at 24 0533    docusate sodium capsule 100 mg, 100 mg, Oral, BID, Briana Andrade, FNP, 100 mg at 24    enoxaparin injection 40 mg, 40 mg, Subcutaneous, Q12H, Briana Andrade, FNP, 40 mg at 24    gabapentin capsule 300 mg, 300 mg, Oral, TID AC, Catalina Silver A, FNP, 300 mg at 24 0533    hydrALAZINE injection 10 mg, 10 mg,  "Intravenous, Q4H PRN, Briana Andrade, FNP    HYDROcodone-acetaminophen 5-325 mg per tablet 1 tablet, 1 tablet, Oral, Daily PRN, Briana Andrade, FNP    hydrOXYzine pamoate capsule 25 mg, 25 mg, Oral, QHS, Briana Andrade B, FNP, 25 mg at 11/02/24 2136    labetalol 20 mg/4 mL (5 mg/mL) IV syring, 10 mg, Intravenous, Q4H PRN, Briana Andrade, FNP    methocarbamoL tablet 500 mg, 500 mg, Oral, Q8H, Briana Andrade B, FNP, 500 mg at 11/03/24 0533    metoprolol injection 10 mg, 10 mg, Intravenous, Q2H PRN, Briana Andrade, FNP    nitroGLYCERIN SL tablet 0.4 mg, 0.4 mg, Sublingual, Q5 Min PRN, Briana Andrade, FNP    ondansetron disintegrating tablet 4 mg, 4 mg, Oral, Q6H PRN, Briana Andrade, FNP    ondansetron injection 4 mg, 4 mg, Intravenous, Q8H PRN, Briana Andrade B, FNP    oxyCODONE immediate release tablet 5 mg, 5 mg, Oral, Q4H PRN, Briana Andrade, FNP, 5 mg at 11/03/24 0533    polyethylene glycol packet 17 g, 17 g, Oral, Q12H PRN, Briana Andrade B, FNP    polyvinyl alcohol-povidon(PF) 1.4-0.6 % Dpet 1 drop, 1 drop, Ophthalmic, Q4H PRN, Briana Andrade B, FNP, 1 drop at 11/02/24 2141    sodium chloride oral tablet 1,000 mg, 1,000 mg, Oral, BID, Briana Andrade, FNP, 1,000 mg at 11/02/24 2136     Review of Systems   Complete 12-point review of symptoms negative except for what's mentioned in HPI     Objective:     /72   Pulse 80   Temp 99.5 °F (37.5 °C) (Oral)   Resp 18   Ht 5' 2" (1.575 m)   Wt 62.2 kg (137 lb 3.2 oz)   SpO2 (!) 93%   BMI 25.09 kg/m²      Physical Exam  Vitals reviewed.   Eyes:      Pupils: Pupils are equal, round, and reactive to light.   Cardiovascular:      Rate and Rhythm: Normal rate and regular rhythm.      Heart sounds: Normal heart sounds.   Pulmonary:      Effort: Pulmonary effort is normal.      Breath sounds: Normal breath sounds.   Abdominal:      General: Bowel sounds are normal.   Musculoskeletal:         General: Normal range of motion.      Right " lower le+ Edema present.   Skin:     General: Skin is warm.      Comments: Right lower extremity incision dry and intact    Neurological:      General: No focal deficit present.      Mental Status: She is alert and oriented to person, place, and time.      Motor: Weakness present.   Psychiatric:         Mood and Affect: Mood normal.       Lines/Drains/Airways       Peripheral Intravenous Line  Duration                  Peripheral IV - Single Lumen 10/21/24 1136 20 G Anterior;Right Forearm 12 days                    Labs  No results found for this or any previous visit (from the past 24 hours).      Radiology  Right femur x-ray 10/22/2024:  Impression:  Interval insertion of IM carina.  Suprapatellar effusion.    Assessment/Plan:     74 y.o. WF admitted on 10/28/2024     Right femur fracture / fall   - s/p IM stabilization on 10/22  - RLE WBAT, full ROM  - continue                Gabapentin 300 mg t.i.d.                Robaxin 500 mg every 8 hours  - follow-up with orthopedic surgery outpatient     Hyponatremia  - stable  - continue                Sodium chloride 1000 mg b.i.d. (decreased )     Constipation   - Stable   - Continue                 Colace 100 mg b.i.d.     Anemia  - asymptomatic  - s/p transfusion                x1 units PRBC on 10/26/2024  - H/H stable   - no evidence of active bleeds  - will closely monitor and transfuse if needed      SVT  - currently in normal sinus rhythm  - held metoprolol tartrate 12.5 mg b.i.d. / reported orthostatic hypotension  - follow-up with cardiology outpatient     HLD  - FLP outpatient  - not currently on statin     HFpEF  - EF 65% with grade 1 diastolic dysfunction  - follow-up with cardiology outpatient     VHD  - mild MR/MS/TR  - follow-up with cardiology outpatient     Major depressive disorder  - current  - continue   BuSpar 5 mg t.i.d. (initiated 10/29)    Insomnia  - current  - continue   Vistaril 50 mg at bedtime scheduled     VTE Prophylaxis:  Lovenox  40 mg b.i.d.     POA: no  Living will: no  Contacts: Brooks Fernandez (spouse) [phone]     CODE STATUS: Full  Internal Medicine (attending): Alexander Kitchen MD  Physiatry (consulting):  Isaiah Navas MD     OUTPATIENT PROVIDERS  PCP: DEX Kenny  Cardiology: Doni Barnhart MD  Orthopedic surgery: Mingo Sherman MD     DISPOSITION:  Sleep hygiene, bowel maintenance, and appetite at goal.  Last BM 11/2.  Vital signs at goal with no recorded fevers.  No new labs or imaging today.  Continue current POC.  Monitor closely.  Notify of acute changes.  Repeat lab work in the morning.  Initiate Lasix 40 mg IVP x1 due to right lower extremity edema.

## 2024-11-03 NOTE — PT/OT/SLP PROGRESS
"Physical Therapy Inpatient Rehab Treatment    Patient Name:  Jerilyn Fernandez   MRN:  [id number]    Recommendations:     Discharge Recommendations:  Low Intensity Therapy   Discharge Equipment Recommendations:     Barriers to discharge: None    Assessment:     Jerilyn Fernandez is a 74 y.o. female admitted with a medical diagnosis of Closed fracture of right femur.  She presents with the following impairments/functional limitations:  weakness, impaired endurance, impaired functional mobility, gait instability, impaired balance, impaired cognition, decreased lower extremity function, pain, decreased ROM, edema, orthopedic precautions.    Rehab Diagnosis: Fall in shower, right femoral shaft fracture s/p IM rodding 10/22/2024. Pt. Has a history of HTN, HLD, anxiety, and migraines.    General Precautions: Standard, fall     Orthopedic Precautions:RLE weight bearing as tolerated (ROMAT)     Braces: N/A    Rehab Prognosis: Good; patient would benefit from acute skilled PT services to address these deficits and reach maximum level of function.      History:     No past medical history on file.    Past Surgical History:   Procedure Laterality Date    RETROGRADE INTRAMEDULLARY RODDING OF DISTAL FEMUR Right 10/22/2024    Procedure: INSERTION, INTRAMEDULLARY BHARGAVI, FEMUR, DISTAL, RETROGRADE;  Surgeon: Mingo Sherman MD;  Location: [facility];  Service: Orthopedics;  Laterality: Right;  Supine, flat brigido, traction, triangles  Synthes RFNA  please make second case       Subjective     Patient comments: "I think my leg is a little less swollen"    Respiratory Status: Room air    Patients cultural, spiritual, Ep[employer]opalian conflicts given the current situation: no    Objective:     Patient found up in chair with peripheral IV  upon PT entry to room.    Pt is  Anxious, Alert, and Cooperative.    Functional Mobility:      Current   Status  Discharge   Goal   Functional Area: Care Score:    Sit to Lying 6  Ind Independent   Sit to Stand 6  Ind with " RW Independent   Walk 10 Feet 6  Ind with RW for first 10ft Independent   Walk 50 Feet with Two Turns 4  SBA with RW. End of session, PT simulated home environment by having pt ambulate 50-80ft for 3 bouts, seated rest breaks between.  Independent   Walk 150 Feet 4  SBA with RW, 235ft + 135ft.  Supervision or touching assistance       Therapeutic Activities and Exercises:  - Toilet transfer, Independent with RW stand step  - // bars, pt performed:    -Side steps   -Forward/backward walking    -R knee bends    - Seated edge of WC, 2x10-15:   - R knee bends by sliding towel forward/back   - LLE LAQ with 1.5# ankle weight    - Hip abd/add by sliding towel L/R        Activity Tolerance: Good    Patient left supine with call button in reach and Ice pack on R hip .    Education provided: gait training, safety awareness, body mechanics, assistive device, and strengthening exercises    Expected compliance: Moderate compliance    Plan:     During this hospitalization, patient to be seen 5 x/week (5-7 x/week) to address the identified rehab impairments via gait training, therapeutic activities, therapeutic exercises, neuromuscular re-education, wheelchair management/training and progress toward the following goals:    GOALS:   Multidisciplinary Problems       Physical Therapy Goals          Problem: Physical Therapy    Goal Priority Disciplines Outcome Interventions   Physical Therapy Goal     PT, PT/OT Progressing    Description: Bed Mobility:  Roll left and right with setup/clean-up assist.   Sit to supine transfer with supervision/touching assist.   Supine to sit transfer with supervision/touching assist.     Transfers:  Sit to stand transfer with supervision/touching assist using RW.   Bed to chair transfer with supervision/touching assist Stand Step  using RW.   Car transfer with partial/moderate assist using RW.    an object from the ground in standing position with partial/moderate assist using RW.      Mobility:  Ambulate 75 feet with partial/moderate assist using RW.                         Plan of Care Expires:  11/15/24  PT Next Visit Date: 11/04/24  Plan of Care reviewed with: patient    Additional Information:         Time Tracking:     Therapy Time  PT Start Time: 0730  PT Stop Time: 0900  PT Total Time (min): 90 min   PT Individual: 90  Missed Time:    Time Missed due to:      Billable Minutes: Gait Training 40, Therapeutic Activity 30, and Therapeutic Exercise 20    11/03/2024

## 2024-11-03 NOTE — PT/OT/SLP PROGRESS
Occupational Therapy Inpatient Rehab Treatment    Name: Jerilyn Fernandez  MRN: [id number]    Assessment:  Jerilyn Fernandez is a 74 y.o. female admitted with a medical diagnosis of Closed fracture of right femur.  She presents with the following impairments/functional limitations:  weakness, impaired endurance, decreased lower extremity function, pain .    General Precautions: Standard, fall     Orthopedic Precautions:RLE weight bearing as tolerated (full ROM)     Braces: N/A    Rehab Prognosis: Good; patient would benefit from acute skilled OT services to address these deficits and reach maximum level of function.      History:     No past medical history on file.    Past Surgical History:   Procedure Laterality Date    RETROGRADE INTRAMEDULLARY RODDING OF DISTAL FEMUR Right 10/22/2024    Procedure: INSERTION, INTRAMEDULLARY BHARGAVI, FEMUR, DISTAL, RETROGRADE;  Surgeon: Mingo Sherman MD;  Location: [facility];  Service: Orthopedics;  Laterality: Right;  Supine, flat brigido, traction, triangles  Synthes RFNA  please make second case       Subjective     Orientation: Oriented x4    Chief Complaint: R hip pain and light-headedness    Patient/Family Comments/goals:     Vitals   Vitals at Rest  /69      O2 Sat 97   Pain 8/10       Respiratory Status: Room air    Patients cultural, spiritual, Tenriism conflicts given the current situation: no       Objective:     Patient found HOB elevated with peripheral IV  upon OT entry to room.    Mobility   Patient completed:  Supine to Sit with modified independence and with leg lift  Sit to Supine with modified independence and with leg lift  Sit to Stand Transfer with independence with rolling walker  Stand to Sit Transfer with independence with rolling walker  Toilet Transfer Step Transfer technique with independence with  rolling walker    Functional Mobility  Pt ambulated in/out BR c/ rolling walker c/ indp.   Pt very anxious this session and worried about increased toileting  d/t taking lasix. CHA provided encouragement and ensured pt she would be able to go toilet whenever needed. Pt c/o light-headedness and increased pain in hip. Vitals checked, noted HR elevated. Nurse notified of above. Will let pt rest at this time    ADLs   Current Status   Lower Body Dressing 4 doffing elastic waist pants, assist to fully unthread RLE   Toileting Hygiene 4    Toilet Transfer 6     Limiting Factors for ADLs: motor, sensory, psychsocial, and pain             LifeStyle Change and Education:             Patient left HOB elevated with all lines intact, call button in reach, and nurse notified.     Education provided: Roles and goals of OT    Multidisciplinary Problems       Occupational Therapy Goals          Problem: Occupational Therapy    Goal Priority Disciplines Outcome Interventions   Occupational Therapy Goal     OT, PT/OT Progressing    Description: ADLs:  Pt to perform grooming tasks with SBA standing at BS sink with LRAD  Pt to perform feeding tasks with independence   Pt to perform UB dressing with setup assist   Pt to perform LB dressing with mod A using AE as needed   Pt to perform putting on/off footwear task with max A using AE as needed  Pt to perform toileting with SBA  Pt to perform bathing with mod A using AE as needed    Functional Transfers:  Pt to perform toilet transfers with SBA and BSC over toilet   Pt to perform a tub transfer with CGA and TTB   Pt to perform a walk-in shower transfer with CGA     IADLs:  Pt to perform simple meal prep and light housekeeping with independence and LRAD                        Time Tracking     OT Received On: 11/03/24  Time In 1250     Time Out 1315  Total Time 25 min  Therapy Time: OT Individual: 25  Missed Time: 65 Minutes  Missed Time Reason: Pain, Patient fatigue    Billable Minutes: Self Care/Home Management 25    11/03/2024

## 2024-11-04 LAB
ALBUMIN SERPL-MCNC: 2.9 G/DL (ref 3.4–4.8)
ALBUMIN/GLOB SERPL: 0.8 RATIO (ref 1.1–2)
ALP SERPL-CCNC: 180 UNIT/L (ref 40–150)
ALT SERPL-CCNC: 18 UNIT/L (ref 0–55)
ANION GAP SERPL CALC-SCNC: 8 MEQ/L
AST SERPL-CCNC: 18 UNIT/L (ref 5–34)
BASOPHILS # BLD AUTO: 0.02 X10(3)/MCL
BASOPHILS NFR BLD AUTO: 0.4 %
BILIRUB SERPL-MCNC: 1.2 MG/DL
BUN SERPL-MCNC: 14.7 MG/DL (ref 9.8–20.1)
CALCIUM SERPL-MCNC: 9 MG/DL (ref 8.4–10.2)
CHLORIDE SERPL-SCNC: 106 MMOL/L (ref 98–107)
CO2 SERPL-SCNC: 23 MMOL/L (ref 23–31)
CREAT SERPL-MCNC: 0.78 MG/DL (ref 0.55–1.02)
CREAT/UREA NIT SERPL: 19
EOSINOPHIL # BLD AUTO: 0.12 X10(3)/MCL (ref 0–0.9)
EOSINOPHIL NFR BLD AUTO: 2.7 %
ERYTHROCYTE [DISTWIDTH] IN BLOOD BY AUTOMATED COUNT: 15.8 % (ref 11.5–17)
GFR SERPLBLD CREATININE-BSD FMLA CKD-EPI: >60 ML/MIN/1.73/M2
GLOBULIN SER-MCNC: 3.5 GM/DL (ref 2.4–3.5)
GLUCOSE SERPL-MCNC: 101 MG/DL (ref 82–115)
HCT VFR BLD AUTO: 30.5 % (ref 37–47)
HGB BLD-MCNC: 9.7 G/DL (ref 12–16)
IMM GRANULOCYTES # BLD AUTO: 0.03 X10(3)/MCL (ref 0–0.04)
IMM GRANULOCYTES NFR BLD AUTO: 0.7 %
LYMPHOCYTES # BLD AUTO: 1.22 X10(3)/MCL (ref 0.6–4.6)
LYMPHOCYTES NFR BLD AUTO: 27.2 %
MAGNESIUM SERPL-MCNC: 1.7 MG/DL (ref 1.6–2.6)
MCH RBC QN AUTO: 30.4 PG (ref 27–31)
MCHC RBC AUTO-ENTMCNC: 31.8 G/DL (ref 33–36)
MCV RBC AUTO: 95.6 FL (ref 80–94)
MONOCYTES # BLD AUTO: 0.44 X10(3)/MCL (ref 0.1–1.3)
MONOCYTES NFR BLD AUTO: 9.8 %
NEUTROPHILS # BLD AUTO: 2.65 X10(3)/MCL (ref 2.1–9.2)
NEUTROPHILS NFR BLD AUTO: 59.2 %
NRBC BLD AUTO-RTO: 0 %
PHOSPHATE SERPL-MCNC: 3.5 MG/DL (ref 2.3–4.7)
PLATELET # BLD AUTO: 439 X10(3)/MCL (ref 130–400)
PMV BLD AUTO: 8.9 FL (ref 7.4–10.4)
POTASSIUM SERPL-SCNC: 4 MMOL/L (ref 3.5–5.1)
PREALB SERPL-MCNC: 18.6 MG/DL (ref 14–37)
PROT SERPL-MCNC: 6.4 GM/DL (ref 5.8–7.6)
RBC # BLD AUTO: 3.19 X10(6)/MCL (ref 4.2–5.4)
SODIUM SERPL-SCNC: 137 MMOL/L (ref 136–145)
WBC # BLD AUTO: 4.48 X10(3)/MCL (ref 4.5–11.5)

## 2024-11-04 PROCEDURE — 84100 ASSAY OF PHOSPHORUS: CPT | Performed by: NURSE PRACTITIONER

## 2024-11-04 PROCEDURE — 25000003 PHARM REV CODE 250: Performed by: NURSE PRACTITIONER

## 2024-11-04 PROCEDURE — 85025 COMPLETE CBC W/AUTO DIFF WBC: CPT | Performed by: NURSE PRACTITIONER

## 2024-11-04 PROCEDURE — 36415 COLL VENOUS BLD VENIPUNCTURE: CPT | Performed by: NURSE PRACTITIONER

## 2024-11-04 PROCEDURE — 97110 THERAPEUTIC EXERCISES: CPT

## 2024-11-04 PROCEDURE — 84134 ASSAY OF PREALBUMIN: CPT | Performed by: NURSE PRACTITIONER

## 2024-11-04 PROCEDURE — 63600175 PHARM REV CODE 636 W HCPCS: Performed by: NURSE PRACTITIONER

## 2024-11-04 PROCEDURE — 80053 COMPREHEN METABOLIC PANEL: CPT | Performed by: NURSE PRACTITIONER

## 2024-11-04 PROCEDURE — 99900031 HC PATIENT EDUCATION (STAT)

## 2024-11-04 PROCEDURE — 97116 GAIT TRAINING THERAPY: CPT

## 2024-11-04 PROCEDURE — 11800000 HC REHAB PRIVATE ROOM

## 2024-11-04 PROCEDURE — 97530 THERAPEUTIC ACTIVITIES: CPT

## 2024-11-04 PROCEDURE — 97164 PT RE-EVAL EST PLAN CARE: CPT

## 2024-11-04 PROCEDURE — 97535 SELF CARE MNGMENT TRAINING: CPT

## 2024-11-04 PROCEDURE — 83735 ASSAY OF MAGNESIUM: CPT | Performed by: NURSE PRACTITIONER

## 2024-11-04 PROCEDURE — 94761 N-INVAS EAR/PLS OXIMETRY MLT: CPT

## 2024-11-04 PROCEDURE — 94799 UNLISTED PULMONARY SVC/PX: CPT

## 2024-11-04 PROCEDURE — 97168 OT RE-EVAL EST PLAN CARE: CPT

## 2024-11-04 RX ADMIN — SODIUM CHLORIDE 1000 MG: 1 TABLET ORAL at 08:11

## 2024-11-04 RX ADMIN — BUSPIRONE HYDROCHLORIDE 5 MG: 5 TABLET ORAL at 04:11

## 2024-11-04 RX ADMIN — OXYCODONE HYDROCHLORIDE 5 MG: 5 TABLET ORAL at 04:11

## 2024-11-04 RX ADMIN — OXYCODONE HYDROCHLORIDE 5 MG: 5 TABLET ORAL at 09:11

## 2024-11-04 RX ADMIN — GABAPENTIN 300 MG: 300 CAPSULE ORAL at 05:11

## 2024-11-04 RX ADMIN — HYDROXYZINE PAMOATE 25 MG: 25 CAPSULE ORAL at 09:11

## 2024-11-04 RX ADMIN — ENOXAPARIN SODIUM 40 MG: 40 INJECTION SUBCUTANEOUS at 08:11

## 2024-11-04 RX ADMIN — METHOCARBAMOL 500 MG: 500 TABLET ORAL at 09:11

## 2024-11-04 RX ADMIN — BUSPIRONE HYDROCHLORIDE 5 MG: 5 TABLET ORAL at 12:11

## 2024-11-04 RX ADMIN — DOCUSATE SODIUM 100 MG: 100 CAPSULE, LIQUID FILLED ORAL at 08:11

## 2024-11-04 RX ADMIN — ENOXAPARIN SODIUM 40 MG: 40 INJECTION SUBCUTANEOUS at 09:11

## 2024-11-04 RX ADMIN — GABAPENTIN 300 MG: 300 CAPSULE ORAL at 12:11

## 2024-11-04 RX ADMIN — GABAPENTIN 300 MG: 300 CAPSULE ORAL at 04:11

## 2024-11-04 RX ADMIN — BUSPIRONE HYDROCHLORIDE 5 MG: 5 TABLET ORAL at 05:11

## 2024-11-04 RX ADMIN — METHOCARBAMOL 500 MG: 500 TABLET ORAL at 02:11

## 2024-11-04 RX ADMIN — METHOCARBAMOL 500 MG: 500 TABLET ORAL at 05:11

## 2024-11-04 RX ADMIN — SODIUM CHLORIDE 1000 MG: 1 TABLET ORAL at 09:11

## 2024-11-04 RX ADMIN — POLYVINYL ALCOHOL, POVIDONE 1 DROP: 14; 6 SOLUTION/ DROPS OPHTHALMIC at 04:11

## 2024-11-04 NOTE — PROGRESS NOTES
[facility] ([facility])  Rehab Progress Note    Patient Name: Jerilyn Fernandez  MRN: [id number]  Age: 74 y.o. Sex: female  : 1950  Hospital Length of Stay: 7 days  Date of Service: 2024   Chief Complaint: Right femur fracture 2/2 fall s/p IM stabilization on 10/22     Subjective:     Basic Information  Admit Information: 74-year-old WF presented to [facility] on 10/21 via EMS with complaints of right-sided hip pain after slipping and falling at home.  PMH significant for hypertension and hyperlipidemia.  Workup significant for right femur fracture.  On 10/22 tolerated IM stabilization without perioperative complications.  Postoperatively reported SVT requiring esmolol then transition to normal sinus rhythm.  Initiated metoprolol tartrate 12.5 mg b.i.d. per Cardiology.  Continued with orthostatic hypotension and trauma surgery held beta-blockers and Flomax.  Required 1 unit PRBC transfusion on 10/26. Tolerated transfer to [facility] inpatient rehab unit on 10/28 without incident.   Today's Information: No acute events overnight.  Sitting up in chair.  Reports good sleep and appetite.  Last BM .  Vital signs at goal with no recorded fevers.  H&H trending up.  CMP and CBC unremarkable.  No new imaging today.    Review of patient's allergies indicates:  No Known Allergies     Current Facility-Administered Medications:     acetaminophen tablet 650 mg, 650 mg, Oral, Q6H PRN, Briana Andrade, FNP, 650 mg at 10/31/24 0555    benzonatate capsule 100 mg, 100 mg, Oral, TID PRN, Briana Andrade, ELAYNEP    busPIRone tablet 5 mg, 5 mg, Oral, TID AC, Catalina Silver, FNP, 5 mg at 24 0533    docusate sodium capsule 100 mg, 100 mg, Oral, BID, Briana Andrade, FNP, 100 mg at 24 0822    enoxaparin injection 40 mg, 40 mg, Subcutaneous, Q12H, Briana Andrade, FNP, 40 mg at 24 0822    gabapentin capsule 300 mg, 300 mg, Oral, TID ACNadeem Kathryn A, FNP, 300 mg at 24 0533     "hydrALAZINE injection 10 mg, 10 mg, Intravenous, Q4H PRN, Briana Andrade B, FNP    HYDROcodone-acetaminophen 5-325 mg per tablet 1 tablet, 1 tablet, Oral, Daily PRN, Briana Andrade, FNP    hydrOXYzine pamoate capsule 25 mg, 25 mg, Oral, QHS, Briana Andrade B, FNP, 25 mg at 11/03/24 2112    labetalol 20 mg/4 mL (5 mg/mL) IV syring, 10 mg, Intravenous, Q4H PRN, Briana Andrade B, FNP    methocarbamoL tablet 500 mg, 500 mg, Oral, Q8H, Briana Andrade B, FNP, 500 mg at 11/04/24 0532    metoprolol injection 10 mg, 10 mg, Intravenous, Q2H PRN, Briana Andrade B, FNP    nitroGLYCERIN SL tablet 0.4 mg, 0.4 mg, Sublingual, Q5 Min PRN, Briana Andrade, FNP    ondansetron disintegrating tablet 4 mg, 4 mg, Oral, Q6H PRN, Briana Andrade B, FNP    ondansetron injection 4 mg, 4 mg, Intravenous, Q8H PRN, Briana Andrade, FNP    oxyCODONE immediate release tablet 5 mg, 5 mg, Oral, Q4H PRN, Briana Andrade B, FNP, 5 mg at 11/03/24 2112    polyethylene glycol packet 17 g, 17 g, Oral, Q12H PRN, Briana Andrade B, FNP    polyvinyl alcohol-povidon(PF) 1.4-0.6 % Dpet 1 drop, 1 drop, Ophthalmic, Q4H PRN, Briana Andrade B, FNP, 1 drop at 11/03/24 2114    sodium chloride oral tablet 1,000 mg, 1,000 mg, Oral, BID, Briana Andrade, FNP, 1,000 mg at 11/04/24 0822     Review of Systems   Complete 12-point review of symptoms negative except for what's mentioned in HPI     Objective:     /74   Pulse 81   Temp 99.1 °F (37.3 °C) (Oral)   Resp 20   Ht 5' 2" (1.575 m)   Wt 62.2 kg (137 lb 3.2 oz)   SpO2 95%   BMI 25.09 kg/m²      Physical Exam  Vitals reviewed.   Eyes:      Pupils: Pupils are equal, round, and reactive to light.   Cardiovascular:      Rate and Rhythm: Normal rate and regular rhythm.      Heart sounds: Normal heart sounds.   Pulmonary:      Effort: Pulmonary effort is normal.      Breath sounds: Normal breath sounds.   Abdominal:      General: Bowel sounds are normal.   Musculoskeletal:         General: Normal " range of motion.      Right lower le+ Edema present.   Skin:     General: Skin is warm.      Comments: Right lower extremity incision dry and intact    Neurological:      General: No focal deficit present.      Mental Status: She is alert and oriented to person, place, and time.      Motor: Weakness present.   Psychiatric:         Mood and Affect: Mood normal.     *MD performed and documented physical examination       Lines/Drains/Airways       Peripheral Intravenous Line  Duration                  Peripheral IV - Single Lumen 10/21/24 1136 20 G Anterior;Right Forearm 13 days                    Labs  Recent Results (from the past 24 hours)   Comprehensive Metabolic Panel    Collection Time: 24  5:48 AM   Result Value Ref Range    Sodium 137 136 - 145 mmol/L    Potassium 4.0 3.5 - 5.1 mmol/L    Chloride 106 98 - 107 mmol/L    CO2 23 23 - 31 mmol/L    Glucose 101 82 - 115 mg/dL    Blood Urea Nitrogen 14.7 9.8 - 20.1 mg/dL    Creatinine 0.78 0.55 - 1.02 mg/dL    Calcium 9.0 8.4 - 10.2 mg/dL    Protein Total 6.4 5.8 - 7.6 gm/dL    Albumin 2.9 (L) 3.4 - 4.8 g/dL    Globulin 3.5 2.4 - 3.5 gm/dL    Albumin/Globulin Ratio 0.8 (L) 1.1 - 2.0 ratio    Bilirubin Total 1.2 <=1.5 mg/dL     (H) 40 - 150 unit/L    ALT 18 0 - 55 unit/L    AST 18 5 - 34 unit/L    eGFR >60 mL/min/1.73/m2    Anion Gap 8.0 mEq/L    BUN/Creatinine Ratio 19    Prealbumin    Collection Time: 24  5:48 AM   Result Value Ref Range    Prealbumin 18.6 14.0 - 37.0 mg/dL   Magnesium    Collection Time: 24  5:48 AM   Result Value Ref Range    Magnesium Level 1.70 1.60 - 2.60 mg/dL   Phosphorus    Collection Time: 24  5:48 AM   Result Value Ref Range    Phosphorus Level 3.5 2.3 - 4.7 mg/dL   CBC with Differential    Collection Time: 24  5:48 AM   Result Value Ref Range    WBC 4.48 (L) 4.50 - 11.50 x10(3)/mcL    RBC 3.19 (L) 4.20 - 5.40 x10(6)/mcL    Hgb 9.7 (L) 12.0 - 16.0 g/dL    Hct 30.5 (L) 37.0 - 47.0 %    MCV 95.6 (H)  80.0 - 94.0 fL    MCH 30.4 27.0 - 31.0 pg    MCHC 31.8 (L) 33.0 - 36.0 g/dL    RDW 15.8 11.5 - 17.0 %    Platelet 439 (H) 130 - 400 x10(3)/mcL    MPV 8.9 7.4 - 10.4 fL    Neut % 59.2 %    Lymph % 27.2 %    Mono % 9.8 %    Eos % 2.7 %    Basophil % 0.4 %    Lymph # 1.22 0.6 - 4.6 x10(3)/mcL    Neut # 2.65 2.1 - 9.2 x10(3)/mcL    Mono # 0.44 0.1 - 1.3 x10(3)/mcL    Eos # 0.12 0 - 0.9 x10(3)/mcL    Baso # 0.02 <=0.2 x10(3)/mcL    IG# 0.03 0 - 0.04 x10(3)/mcL    IG% 0.7 %    NRBC% 0.0 %         Radiology  Right femur x-ray 10/22/2024:  Impression:  Interval insertion of IM carina.  Suprapatellar effusion.    Assessment/Plan:     74 y.o. WF admitted on 10/28/2024     Right femur fracture 2/2 fall   - s/p IM stabilization on 10/22  - RLE WBAT, full ROM  - continue                Gabapentin 300 mg t.i.d.                Robaxin 500 mg every 8 hours  - follow-up with orthopedic surgery outpatient     Hyponatremia  - stable  - continue                Sodium chloride 1000 mg b.i.d. (decreased 11/1)     Constipation   - Stable   - Continue                 Colace 100 mg b.i.d.     Anemia  - asymptomatic  - s/p transfusion                x1 units PRBC on 10/26/2024  - H/H stable   - no evidence of active bleeds  - will closely monitor and transfuse if needed      SVT  - currently in normal sinus rhythm  - held metoprolol tartrate 12.5 mg b.i.d. 2/2 reported orthostatic hypotension  - follow-up with cardiology outpatient     HLD  - FLP outpatient  - not currently on statin     HFpEF  - EF 65% with grade 1 diastolic dysfunction  - follow-up with cardiology outpatient     VHD  - mild MR/MS/TR  - follow-up with cardiology outpatient     Major depressive disorder  - current  - continue   BuSpar 5 mg t.i.d. (initiated 10/29)    Insomnia  - current  - continue   Vistaril 50 mg at bedtime scheduled     VTE Prophylaxis:  Lovenox 40 mg b.i.d.     POA: no  Living will: no  Contacts: Brooks Fernandez (spouse) [phone]     CODE STATUS:  Full  Internal Medicine (attending): Alexander Kitchen MD  Physiatry (consulting):  Isaiah Naavs MD     OUTPATIENT PROVIDERS  PCP: DEX Kenny  Cardiology: Doni Barhnart MD  Orthopedic surgery: Mingo Sherman MD     DISPOSITION:  Sleep hygiene, bowel maintenance, and appetite at goal.  Last BM 11/2.  Vital signs at goal with no recorded fevers.  H&H trending up.  Lab work otherwise unremarkable.  Tolerated Lasix IVP yesterday with improvement in lower extremity swelling.  Continue aggressive mobilization as tolerated.  Monitor closely.  Notify of acute changes.    Staffing 11/4/2024: Continent of bowel and bladder.  Incision looks good. RLE edema improving.  RT: Set up to independent.  Limited by anxiety and pain.  Appetite improving.  Tolerating boost breeze.  PT: Set up to independent.  Tolerating 235 feet with RW.  Self-limited at times.  OT: Independent to supervision with Adls.  Needs reassurance.  Projected discharge 11/7 home with outpatient.      Dong Kumar NP conducted independent physical examination and assisted with medical documentation.    Total time spent on this encounter including chart review and direct MD + NP 1-on-1 patient interaction: 54 minutes   Over 50% of this time was spent in counseling and coordination of care

## 2024-11-04 NOTE — PT/OT/SLP EVAL
"Physical Therapy Rehab Re-Evaluation    Patient Name:  Jerilyn Fernandez   MRN:  [id number]    Recommendations:     Discharge Recommendations:  Low Intensity Therapy   Discharge Equipment Recommendations: walker, rolling   Barriers to discharge: None    Assessment:     Jerilyn Fernandez is a 74 y.o. female admitted with a medical diagnosis of Closed fracture of right femur.  She presents with the following impairments/functional limitations:  impaired endurance, decreased lower extremity function, decreased safety awareness, edema.    Rehab Diagnosis: Fall in shower, right femoral shaft fracture s/p IM rodding 10/22/2024. Pt. Has a history of HTN, HLD, anxiety, and migraines.    General Precautions: Standard, fall     Orthopedic Precautions: RLE weight bearing as tolerated (ROMAT)     Braces: N/A    Rehab Prognosis: Good; patient would benefit from acute skilled PT services to address these deficits and reach maximum level of function.      History:     No past medical history on file.    Past Surgical History:   Procedure Laterality Date    RETROGRADE INTRAMEDULLARY RODDING OF DISTAL FEMUR Right 10/22/2024    Procedure: INSERTION, INTRAMEDULLARY BHARGAVI, FEMUR, DISTAL, RETROGRADE;  Surgeon: Mingo Sherman MD;  [facility];  Service: Orthopedics;  Laterality: Right;  Supine, flat brigido, traction, triangles  Synthes RFNA  please make second case       Subjective     Patient Goal: "To go home"    Patient Comments: "My leg is less swollen"    Patients cultural, spiritual, Latter-day conflicts given the current situation: no       Living Environment  People in Home: spouse  Name(s) of People in Home: Brooks  Living Arrangements: house  Number of Stairs, Main Entrance: none  Home Layout: Able to live on 1st floor  Transportation Anticipated: family or friend will provide  Equipment Currently Used at Home: none    Prior Level of Function  Ambulation Skills: independent  Stairs: other (see comments) (Not performed prior)  Transfer " "Skills: independent    Equipment used at home: none.  DME owned (not currently used): none.      Upon discharge, patient will have assistance from her , Brooks.    Objective:     Communicated with ALESSIO Castañeda prior to session.  Patient found up in chair with peripheral IV  upon PT entry to room.    Respiratory Status: Room air    Exams  Skin Integrity/Edema:     -       still some edema present in upper R thigh, though improving.   RLE ROM:          -       WFL, except knee and hip flexion somewhat limited by edema  RLE Strength:          -       WFL  LLE ROM:          -       WFL  LLE Strength:          -       WFL    Functional Mobility:    GGs   Admit Current   Status Goal   Functional Area: Care Score: Care Score: Care Score:   Roll Left and Right 4 6  Ind on therapy mat Independent   Sit to Lying 3 6  Ind on therapy mat, height set at 28.5" to simulate home Independent   Lying to Sitting on Side of Bed 3 6  Ind on therapy mat,  height set at 28.5" to simulate home Independent   Sit to Stand 3 6  Ind with RW Independent   Chair/Bed-to-Chair Transfer 3 6  Ind with RW, stand step Independent   Car Transfer 88 6  Ind with RW, stand step Independent   Walk 10 Feet 3 6  Ind with RW Independent   Walk 50 Feet with Two Turns 88 6  Ind with RW Independent   Walk 150 Feet 88 6  Ind with RW, 150ft. Supervision or touching assistance   Walk 10 Feet Uneven Surface 88  Supervision or touching assistance   1 Step (Curb) 9 4  SBA/CGA with RW on 4" curb, 2 trials. VC for sequencing Supervision or touching assistance   4 Steps 9 9 Partial/moderate assistance   12 Steps 9 9 Not applicable   Picking Up Object 88 6  Ind with RW, no reacher required, picking up a pen from a standing position. Set-up/clean-up   Wheel 50 Feet with Two Turns 9 9 Not applicable   Wheel 150 Feet 9 9 Not applicable     Therapeutic Activities and Exercises:  Supine on therapy mat, 2x10 flynn   - glute sets   - quad sets   - hip/knee flexion   - hip " abd/add   - SAQ      Activity Tolerance: Good    Patient left supine with call button in reach.    Education Provided: roles and goals of PT/PTA, safety awareness, body mechanics, assistive device, and strengthening exercises    Expected compliance: Moderate compliance      Plan:     During this hospitalization, patient to be seen 5 x/week (5-7 x/week) to address the identified rehab impairments via gait training, therapeutic activities, therapeutic exercises, neuromuscular re-education, wheelchair management/training and progress toward the following goals:    GOALS:   Multidisciplinary Problems       Physical Therapy Goals          Problem: Physical Therapy    Goal Priority Disciplines Outcome Interventions   Physical Therapy Goal     PT, PT/OT Progressing    Description: Bed Mobility:  Roll left and right with setup/clean-up assist. - MET  Sit to supine transfer with supervision/touching assist. - MET   Supine to sit transfer with supervision/touching assist. - MET    Transfers:  Sit to stand transfer with supervision/touching assist using RW. - MET  Bed to chair transfer with supervision/touching assist Stand Step  using RW. - MET  Car transfer with partial/moderate assist using RW. - MET   an object from the ground in standing position with partial/moderate assist using RW. - MET    Mobility:  Ambulate 75 feet with partial/moderate assist using RW. - MET  Ambulate 200 feet independently using RW  Ambulate 10 feet on uneven surface/ramp using RW with supervision/touching assist.  Ascend/descend a 4 inch curb independently using a RW  Ascend/descend 4 stairs with supervision/touching assist using flynn rails                           Plan of Care Expires:  11/15/24  PT Next Visit Date: 11/08/24  Plan of Care reviewed with: patient      PT Care conference held with PTA. Short term goals updated appropriately. Plan of care updates discussed and reviewed with PTA Jyothi Roman    Additional Infomation:            Time Tracking:     Therapy Time   PT Start Time: 1410  PT Stop Time: 1540  PT Total Time (min): 90 min  PT Individual: 90  Missed Time:    Time Missed due to:      Billable Minutes: Re-eval 15, Therapeutic Activity 40, and Therapeutic Exercise 35    11/04/2024

## 2024-11-04 NOTE — PT/OT/SLP RE-EVAL
Recreational Therapy Re-Evaluation    Pt progress, plan of care and discharge plans were discussed during staffing at 0930      Date of Treatment: 11/04/24  Start Time: 0830  Stop Time: 0900  Total Time: 30 min  Missed Time:     Assessment      Jerilyn Fernandez is a 74 y.o. female admitted with a medical diagnosis of Closed fracture of right femur.  She presents with the following impairments/functional limitations:  weakness, impaired endurance, impaired functional mobility, gait instability, decreased lower extremity function .    Rehab Diagnosis:     Recent Surgery:     General Precautions: Standard, fall     Orthopedic Precautions:RLE weight bearing as tolerated     Braces: N/A    Rehab Prognosis: Good; patient would benefit from acute skilled Recreational Therapy services to address these deficits and reach maximum level of function.      Impairments: Endurance deficits, Mobility deficits, and Strength deficits  Rehab Potential: Good  Treatment Recommendations: Continue with current plan of care   Treatment Diagnosis: Fall in shower, R femoral shaft fx, IMrodding, HTN, anxiety, migraines  Orientation: Oriented x4  Affect/Behavior: Appropriate, Cooperative, and Anxious  Safety/Judgement: intact   Basic Command Following: intact  Spiritual Cultural: no        History     No past medical history on file.    Past Surgical History:   Procedure Laterality Date    RETROGRADE INTRAMEDULLARY RODDING OF DISTAL FEMUR Right 10/22/2024    Procedure: INSERTION, INTRAMEDULLARY BHARGAVI, FEMUR, DISTAL, RETROGRADE;  Surgeon: Mingo Sherman MD;  Location: [facility];  Service: Orthopedics;  Laterality: Right;  Supine, flat brigido, traction, triangles  Synthes RFNA  please make second case       Home Environment     Admit Date: 10/28/24  Living Situation  People in Home: spouse  Name(s) of People in Home: Brooks  Lives in: house  Patients Responsibilities: , Financial management, Health and wellness, Laundry, Leisure/play/hobbies,  "Meal preparation, Retired, Shopping  Number of Children: 0  Occupation:Retired: [employer]    Instrumental Activities of Daily Living     Previous Hand Dominance: Right Current Hand Dominance: Right     Other iADL Information:        Cognitive Skills Building         Cognitive Observation Activity Assist Position Equipment Response            Comment:      Dynamic Activities      Activity Assist Position Equipment Response   Activity 1 Bean bag toos independence and modified independence Standing Rolling walker and Bean bags , reaching tool good   Comment: W/c to toilet to recliner transfer was setup.  Sit to stand was setup/I as was dynamic standing balance/reaching.  Standing tolerance was 5 minutes.  Ambulated 20 feet to pick bean bags from floor with reacher at I level.  UE coordination and sequencing skills were I. Less anxious.  Motivated and determined.         Fine Motor Activities      Activity Assist Position Equipment Response           Comment:        Goals     Patient Goals  Patient Goal 1: "Get stronger and move stronger."    Short Term Goals    Goal  Goal Status   Will increase sit to stand to supervision Met   Will improve dynamic standing balance/reaching to supervision Met                 Long Term Goals    Goal Goal Status   Will increase standing tolerance to 5,10 minutes Progressing   Will improve dynamic standing balance/reaching to setup/I Progressing                     Plan       Patient to be seen: Daily  Duration: Other (Comment) (4 days)  Treatments planned: Energy conservation training  Treatment plan/goals established with Patient/Caregiver: Yes     "

## 2024-11-04 NOTE — PROGRESS NOTES
11/04/24 0830   Rec Therapy Time Calculation   Date of Treatment 11/04/24   Rec Start Time 0830   Rec Stop Time 0900   Rec Total Time (min) 30 min   Time   Treatment time 2 units   Charges   $Therapeutic Exercise 2 units   Precautions   General Precautions fall   Orthopedic Precautions  RLE weight bearing as tolerated   Braces N/A   Pain/Comfort   Pain Rating 1 no pain   OTHER   Rehab identified problem list/impairments weakness;impaired endurance;impaired functional mobility;gait instability;decreased lower extremity function   Values/Beliefs/Spiritual Care   Spiritual, Cultural Beliefs, Judaism Practices, Values that Affect Care no   Overall Level of Functioning   Activity Tolerance Independent   Dynamic Sitting Balance/Reaching Independent   Dynamic Standing Balance/Reaching Independent   Right UE Coodination/Dexterity Independent   Left UE Coordination/Dexterity Independent   Problem Solving/Sequencing Skills Independent   Memory Recall Independent   R/L Neglect/Inattention Does not occur   Attention Span Independent   Social Interaction Independent   Recreational Therapy Short Term Goals   Short Term Goal 1 Progression Met   Short Term Goal 2 Progression Met   Recreational Therapy Long Term Goals   Long Term Goal 1 Progression Progressing   Long Term Goal 2 Progression Progressing   Plan   Patient to be seen Daily   Planned Duration Other (Comment)  (4 days)   Treatments Planned Energy conservation training   Treatment plan/goals estblished with Patient/Caregiver Yes

## 2024-11-04 NOTE — PROGRESS NOTES
Dos 11/4/24  Patient seen in PT gym today  Participation and progress toward goals noted  Continues working on strength, mobility, balance and increasing endurance  Progress and problems discussed with patient and therapists  Questions answered  Chart reviewed and discussed with Dr avila and medicine team as well as Isabella Silver, my FNP  Continue present management  Subjective  HPI: 73 yo WF with a PMH of HTN, HLD, anxiety, and migraines presented to the ED at [facility] on 10/21/24 after falling in the shower at home. Patient reported right lower extremity pain, and admits to having hit her head with a small bump noted to right temporal area. Denied LOC or blood thinners. Right femur XR showed displaced femoral diaphyseal fracture. Chest XR was negative for acute findings. Orthopedic surgeon consulted with recommendation for surgical intervention needed. On 10/22, patient underwent a right intramedullary stabilization of the femur shaft fracture by Dr. MELIA Sherman. Patient was placed WBAT to RLE with full range of motion of the right lower extremity. Started on Lovenox for DVT ppx. Hospital medicine was consulted for multiple runs of SVT post op, and was given Esmolol. EKG showed sinus tachycardia with T-wave abnormality, consider inferior ischemia. Cardiology ([facility]) was consulted for evaluation and treatment of SVT. Started on Metoprolol tart 12.5mg PO BID, gave 2gm of MGSO4 x 1 dose, and checked TSH and free T4 which were both WNL. On 10/23, PT/OT evals completed with deficits noted with recommendation for high intensity therapy needed. NSR noted to telemetry, and BP borderline low. Labs showed low Na of 130, elevated potassium of 5.2, elevated Cr of 1.08, low calcium of 8.1, low H&H of 6.8 & 20.2. Patient was transfused 2 units of PRBCs. On 10/24, labs showed low H&H of 6.4 & 18.6 post transfusion of 2 units so was transfused another 2 units of PRBCs, and Low Na of 125 so salt tabs given. BP remained low so betablocker  held if SBP <100 and flomax held. Cardiology recommended patient to follow up with Dr Barnhart on discharge with no further recommendations, and signed off of case. On 10/25, labs showed low H&H of 8.3 & 23.1, low Na of 132, and low PLT of 110. Daily dressing changes started, no creams or ointments, and ok to shower on POD#7. On 10/26, labs showed low H&H of 7.5 & 21.9 so 1 unit of PRBCs given. On 10/27, labs showed low H&H of 9.2 & 27.0, low Na of 135, low calcium of 8.1. On 10/28, labs showed low H&H of 8.5 & 24.9, low Na of 133, low Ca of 8.2, elevated ALP o 293, low albumin of 2.1, elevated AST of 61, elevated CRP of 141.50. Patient will need follow up appt with Dr Sherman in 2 weeks. Okay for ASA 81mg BID on DC. Patient reported last BM on 10/20. Patient is AAOx4.  Participating with therapy. Functional status includes setup assist needed for eating, moderate assist for bed mobility, moderate assist or transfers with RW, walked 7ft with RW at moderate assist, max assist for lower body dressing, total assist for toileting with purwick in place, and setup assist for grooming while seated. Patient was evaluated, accepted, and admitted to inpatient rehab to improve functional status. Transferred to [facility] on 10/28 without incident.      11/4: Seen with PT, supine on mat performing core and LE exercises. States that her swelling is down and her pain has improved somewhat. Admits that it does still hurt with therapy, but she is progressing well. VSSAF with noted temp of 99.1. Highest documented at 99.5 yesterday. No signs or symptoms of infection. IM following.             Review of Systems  Depression/Anxiety: no complaints     Pain: RLE     gabapentin capsule 300 mg TID AC  methocarbamoL tablet 500 mg q8h  acetaminophen tablet 650 mg q6h PRN mild pain  oxyCODONE immediate release tablet 5 mg q4h PRN mod/severe pain  Bowels/Bladder: last BM 11/3  Appetite: fair  Sleep: good   hydrOXYzine pamoate capsule 50 mg qHS PRN  Insomnia        Physical Exam  General: well-developed, well-nourished, in no acute distress  Respiratory: equal chest rise, no SOB, no audible wheeze  Cardiovascular: regular rate and rhythm, no edema  Gastrointestinal: soft, non-tender, non-distended   Musculoskeletal: decreased ROM/strength to RLE  Integumentary: right upper thigh (groin) skin tear, incision, and blackened scab, right lateral thigh and knee incisions-staples, dressings-c/d/i  Neurologic: cranial nerves intact, no signs of peripheral neurological deficit, motor/sensory function intact  *MD performed and documented physical examination                 Labs:   Latest Reference Range & Units 11/04/24 05:48   WBC 4.50 - 11.50 x10(3)/mcL 4.48 (L)   RBC 4.20 - 5.40 x10(6)/mcL 3.19 (L)   Hemoglobin 12.0 - 16.0 g/dL 9.7 (L)   Hematocrit 37.0 - 47.0 % 30.5 (L)   MCV 80.0 - 94.0 fL 95.6 (H)   MCH 27.0 - 31.0 pg 30.4   MCHC 33.0 - 36.0 g/dL 31.8 (L)   RDW 11.5 - 17.0 % 15.8   Platelet Count 130 - 400 x10(3)/mcL 439 (H)   MPV 7.4 - 10.4 fL 8.9   Neut % % 59.2   LYMPH % % 27.2   Mono % % 9.8   Eos % % 2.7   Basophil % % 0.4   Immature Granulocytes % 0.7   Neut # 2.1 - 9.2 x10(3)/mcL 2.65   Lymph # 0.6 - 4.6 x10(3)/mcL 1.22   Mono # 0.1 - 1.3 x10(3)/mcL 0.44   Eos # 0 - 0.9 x10(3)/mcL 0.12   Baso # <=0.2 x10(3)/mcL 0.02   Immature Grans (Abs) 0 - 0.04 x10(3)/mcL 0.03   nRBC % 0.0   Sodium 136 - 145 mmol/L 137   Potassium 3.5 - 5.1 mmol/L 4.0   Chloride 98 - 107 mmol/L 106   CO2 23 - 31 mmol/L 23   Anion Gap mEq/L 8.0   BUN 9.8 - 20.1 mg/dL 14.7   Creatinine 0.55 - 1.02 mg/dL 0.78   BUN/CREAT RATIO  19   eGFR mL/min/1.73/m2 >60   Glucose 82 - 115 mg/dL 101   Calcium 8.4 - 10.2 mg/dL 9.0   Phosphorus Level 2.3 - 4.7 mg/dL 3.5   Magnesium  1.60 - 2.60 mg/dL 1.70   ALP 40 - 150 unit/L 180 (H)   PROTEIN TOTAL 5.8 - 7.6 gm/dL 6.4   Albumin 3.4 - 4.8 g/dL 2.9 (L)   Albumin/Globulin Ratio 1.1 - 2.0 ratio 0.8 (L)   Prealbumin 14.0 - 37.0 mg/dL 18.6   BILIRUBIN TOTAL  <=1.5 mg/dL 1.2   AST 5 - 34 unit/L 18   ALT 0 - 55 unit/L 18   Globulin, Total 2.4 - 3.5 gm/dL 3.5   (L): Data is abnormally low  (H): Data is abnormally high              Diagnostics:  US LOWER EXTREMITY VEINS RIGHT   Impression:  No deep vein thrombosis identified in the right leg.  Date:                                            10/29/2024  Time:                                           16:09            Assessment/Plan  Hospital   Closed fracture of right femur   Fall   Acute blood loss anemia     Non-Hospital   HTN (hypertension)   HLD (hyperlipidemia)   Anxiety   Migraines       Wounds: right upper thigh (groin) skin tear, incision, and blackened scab, right lateral thigh and knee incisions-staples, dressings-c/d/i  On 10/22, patient underwent a right intramedullary stabilization of the femur shaft fracture by Dr. MELIA Sherman.   Precautions: Patient was placed WBAT to RLE with full range of motion of the right lower extremity.  Bracing/AD: RW  Swallowing: Regular Diet  Function: Tolerating therapy. Continue PT/OT  VTE Prophylaxis:   enoxaparin injection 40 mg SubQ q12h  Code Status: FULL CODE   Discharge:Lives with her spouse in [place] in a single-story home with no steps to enter the residence. Has a GED. She has no  history. She is retired. She worked in the [employer] office for 28 years.  is retired. He states that they share most household duties. He states that he does a little more than she does because he is 11 years younger than her. He also manages finances with the patient and does yardwork. Children: (0). Date 11/7 Thursday.                 Catalina Silver NP, conducted additional independent physical examination and assisted with medical documentation.

## 2024-11-04 NOTE — PLAN OF CARE
Met with pt then called  Brooks ([phone]) to discuss team conference updates and discharge planned Thurs, 11/7, at 1100.  He will pick her up then.    Scheduled family training for Wed, 11/6, at 1300 with Brooks.    Discussed DME needs.  They wish for me to order a RW, BSC, and TTB.  Brooks will pay for OOP expenses when [facility] calls for payment.    Reviewed OP tx clinic options, and [facility] requested [facility] on [address].  Will send order.    1335:  Sent order for RW, BSC, and TTB to Max via [employer] and for outpatient PT to [facility] via fax [phone].

## 2024-11-04 NOTE — PT/OT/SLP RE-EVAL
"Occupational Therapy Inpatient Rehab Re-Evaluation    Name: Jerilyn Fernandez  MRN: [id number]    Recommendations:     Discharge Recommendations:  Low Intensity Therapy   Discharge Equipment Recommendations:     Barriers to discharge: None    Assessment:  Jerilyn Fernandez is a 74 y.o. female admitted with a medical diagnosis of Closed fracture of right femur.  She presents with the following impairments/functional limitations:  impaired endurance, decreased lower extremity function, decreased safety awareness, edema.    General Precautions: Standard, fall     Orthopedic Precautions:RLE weight bearing as tolerated     Braces: N/A    Rehab Prognosis: Good; patient would benefit from acute skilled OT services to address these deficits and reach maximum level of function.      History:     No past medical history on file.    Past Surgical History:   Procedure Laterality Date    RETROGRADE INTRAMEDULLARY RODDING OF DISTAL FEMUR Right 10/22/2024    Procedure: INSERTION, INTRAMEDULLARY BHARGAVI, FEMUR, DISTAL, RETROGRADE;  Surgeon: Mingo Sherman MD;  Location: [facility];  Service: Orthopedics;  Laterality: Right;  Supine, flat brigido, traction, triangles  Synthes RFNA  please make second case       Subjective     Orientation: Oriented x4    Patient/Family Comments/goals: "I'm a little tired but ok."    Vitals  Vitals With Activity  BP     HR     O2 Sat     Pain Pain Rating 1: 0/10  Location - Side 1: Right  Location - Orientation 1: upper  Location 1: leg  Pain Addressed 1: Nurse notified     Respiratory Status: Room air    Patients cultural, spiritual, Church conflicts given the current situation: no     Living Environment   Living Environment  People in Home: spouse  Name(s) of People in Home: Brooks  Living Arrangements: house  Number of Stairs, Main Entrance: none  Home Layout: Able to live on 1st floor  Transportation Anticipated: family or friend will provide  Equipment Currently Used at Home: none  Shower Setup: Tub/Shower combo " and Walk-in shower    Prior Level of Function  BADL: Independent    IADL: Independent    Equipment used at home: rollator, HHS, and 4 pronged cane.      Upon discharge, patient will have assistance from spouse.    Objective:     Patient found up in chair with peripheral IV  upon OT entry to room.    Mobility   Patient completed:  Sit to Stand Transfer with independence with rolling walker  Stand to Sit Transfer with independence with rolling walker  Toilet Transfer Step Transfer technique with independence with  rolling walker  Tub Transfer Stand Pivot technique with independence with rolling walker onto a TTB and able to bring legs into tube independently.    Functional Mobility   In room mobility with RW for ADLs independently.    ADLs     Current Status   Eating 6   Oral Hygiene 6   Shower, Bathe Self 5 set up only   Upper Body Dressing 6   Lower Body Dressing 4 verbal cues needed to dress RLE first   Toileting Hygiene 6   Toilet Transfer 6   Putting On, Taking Off Footwear 6     Limiting Factors for ADLs: psychsocial and endurance      Exams     ROM:          -       WFL    Hand Dominance: Right    ROM Hand  Left Hand: WFL  Right Hand: WFL    Strength  Overall Strength:          -       WFL    Sensation  Left:          -       WNL  Right:          -       WNL    Coordination:      -       Intact    Tone  Left: WNL  Right: WNL    Visual/Perceptual  Intact    Cognition:   WFL    Balance    Standing  Static: No UE Support, Good (+)  Dynamic: No UE extremity support, Good (-)    Patient left up in chair with all lines intact and call button in reach.    Education provided: Roles and goals of OT, ADLs, transfer training, assistive device, modified goals, sequencing, safety precautions, and fall prevention    Multidisciplinary Problems       Occupational Therapy Goals          Problem: Occupational Therapy    Goal Priority Disciplines Outcome Interventions   Occupational Therapy Goal     OT, PT/OT Progressing     Description: ADLs:  MET - Pt to perform grooming tasks with SBA standing at BS sink with LRAD  MET - Pt to perform feeding tasks with independence   MET - Pt to perform UB dressing with setup assist   MET - Pt to perform LB dressing with mod A using AE as needed.  Updated goal: Pt to perform LB dressing with set up using AE as needed.  MET - Pt to perform putting on/off footwear task with max A using AE as needed  MET - Pt to perform toileting with SBA  MET - Pt to perform bathing with mod A using AE as needed.  Updated goal: Pt to perform bathing independently using AE as needed.    Functional Transfers:  MET - Pt to perform toilet transfers with SBA and BSC over toilet   MET - Pt to perform a tub transfer with CGA and TTB   Pt to perform a walk-in shower transfer with CGA     IADLs:  Pt to perform simple meal prep and light housekeeping with independence and LRAD                        Plan     During this hospitalization, patient to be seen 5 x/week (5-7 days per week) to address the identified rehab impairments via self-care/home management, therapeutic activities, therapeutic exercises and progress toward the following goals:    Plan of Care Expires:  11/08/24    Time Tracking     OT Received On: 11/04/24  Time In 1300     Time Out 1400  Total Time 60 min  Therapy Time: OT Individual: 60  Missed Time:    Missed Time Reason:      Billable Minutes: Re-eval 15 and Self Care/Home Management 45    11/04/2024

## 2024-11-04 NOTE — PLAN OF CARE
Problem: Rehabilitation (IRF) Plan of Care  Goal: Plan of Care Review  11/4/2024 0613 by Tasneem Junior LPN  Outcome: Progressing  11/3/2024 2351 by Tasneem Junior LPN  Outcome: Progressing  Goal: Patient-Specific Goal (Individualized)  11/4/2024 0613 by Tasneem Junior LPN  Outcome: Progressing  11/3/2024 2351 by Tasneem Junior LPN  Outcome: Progressing  Goal: Absence of New-Onset Illness or Injury  11/4/2024 0613 by Tasneem Junior LPN  Outcome: Progressing  11/3/2024 2351 by Tasneem Junior LPN  Outcome: Progressing  Goal: Optimal Comfort and Wellbeing  11/4/2024 0613 by Tasneem Junior LPN  Outcome: Progressing  11/3/2024 2351 by Tasneem Junior LPN  Outcome: Progressing  Goal: Home and Community Transition Plan Established  11/4/2024 0613 by Tasneem Junior LPN  Outcome: Progressing  11/3/2024 2351 by Tasneem Junior LPN  Outcome: Progressing

## 2024-11-04 NOTE — PLAN OF CARE
Problem: Rehabilitation (IRF) Plan of Care  Goal: Absence of New-Onset Illness or Injury  Outcome: Progressing  Goal: Optimal Comfort and Wellbeing  Outcome: Progressing  Intervention: Monitor Pain and Promote Comfort  Flowsheets (Taken 11/3/2024 1811)  Pain Management Interventions:   care clustered   medication offered   cold applied   pain management plan reviewed with patient/caregiver   premedicated for activity     Problem: Wound  Goal: Optimal Pain Control and Function  Outcome: Progressing  Goal: Skin Health and Integrity  Outcome: Progressing  Intervention: Optimize Skin Protection  Flowsheets (Taken 11/3/2024 1811)  Activity Management: Ambulated to bathroom - L4  Goal: Optimal Wound Healing  Outcome: Progressing  Intervention: Promote Wound Healing  Flowsheets (Taken 11/3/2024 1811)  Sleep/Rest Enhancement:   awakenings minimized   consistent schedule promoted     Problem: Skin Injury Risk Increased  Goal: Skin Health and Integrity  Outcome: Progressing  Intervention: Optimize Skin Protection  Flowsheets (Taken 11/3/2024 1811)  Activity Management: Ambulated to bathroom - L4

## 2024-11-04 NOTE — PT/OT/SLP PROGRESS
"Physical Therapy Inpatient Rehab Treatment    Patient Name:  Jerilyn Fernandez   MRN:  [id number]    Recommendations:     Discharge Recommendations:  Low Intensity Therapy   Discharge Equipment Recommendations:     Barriers to discharge: Impaired functional mobility     Assessment:     Jerilyn Fernandez is a 74 y.o. female admitted with a medical diagnosis of Closed fracture of right femur.  She presents with the following impairments/functional limitations:  weakness, impaired endurance, impaired functional mobility, gait instability, impaired balance, impaired cognition, decreased lower extremity function, pain, decreased ROM, edema, orthopedic precautions.    Rehab Diagnosis: Fall in shower, right femoral shaft fracture s/p IM rodding 10/22/2024. Pt. Has a history of HTN, HLD, anxiety, and migraines.    General Precautions: Standard, fall     Orthopedic Precautions:RLE weight bearing as tolerated (ROMAT)     Braces: N/A    Rehab Prognosis: Good; patient would benefit from acute skilled PT services to address these deficits and reach maximum level of function.      History:     No past medical history on file.    Past Surgical History:   Procedure Laterality Date    RETROGRADE INTRAMEDULLARY RODDING OF DISTAL FEMUR Right 10/22/2024    Procedure: INSERTION, INTRAMEDULLARY BHARGAVI, FEMUR, DISTAL, RETROGRADE;  Surgeon: Mingo Sherman MD;  Location: [facility];  Service: Orthopedics;  Laterality: Right;  Supine, flat brigido, traction, triangles  Synthes RFNA  please make second case       Subjective     Patient comments: "My leg feels less swollen"    Respiratory Status: Room air    Patients cultural, spiritual, Druze conflicts given the current situation: no    Objective:     Communicated with care team prior to session.  Patient found up in chair with peripheral IV  upon PT entry to room.    Pt is  Anxious, Alert, and Cooperative.      Functional Mobility:      Current   Status  Discharge   Goal   Functional Area: Care Score:  "   Sit to Stand 6  Ind with RW Independent   Walk 10 Feet 6  Ind with RW Independent   Walk 50 Feet with Two Turns 6  Ind with RW for 50ft Independent   Walk 150 Feet 4  SBA with RW for longer distances, pt ambulated 409<>PT gym, ~160ft each way.  Supervision or touching assistance       Therapeutic Activities and Exercises:  Seated edge of mat:   - RLE foot slides forward/backward, LLE LAQ   - RLE foot slides L/R, LLE hip abd   - 2x5 STS with VC to keep RLE on ground.      Activity Tolerance: Fair    Patient left up in chair with call button in reach.    Education provided: roles and goals of PT/PTA, gait training, safety awareness, body mechanics, and strengthening exercises    Expected compliance: Moderate compliance    Plan:     During this hospitalization, patient to be seen 5 x/week (5-7 x/week) to address the identified rehab impairments via gait training, therapeutic activities, therapeutic exercises, neuromuscular re-education, wheelchair management/training and progress toward the following goals:    GOALS:   Multidisciplinary Problems       Physical Therapy Goals          Problem: Physical Therapy    Goal Priority Disciplines Outcome Interventions   Physical Therapy Goal     PT, PT/OT Progressing    Description: Bed Mobility:  Roll left and right with setup/clean-up assist.   Sit to supine transfer with supervision/touching assist.   Supine to sit transfer with supervision/touching assist.     Transfers:  Sit to stand transfer with supervision/touching assist using RW.   Bed to chair transfer with supervision/touching assist Stand Step  using RW.   Car transfer with partial/moderate assist using RW.    an object from the ground in standing position with partial/moderate assist using RW.     Mobility:  Ambulate 75 feet with partial/moderate assist using RW.                         Plan of Care Expires:  11/15/24  PT Next Visit Date: 11/04/24  Plan of Care reviewed with: patient    Additional  Information:         Time Tracking:     Therapy Time  PT Start Time: 1130  PT Stop Time: 1200  PT Total Time (min): 30 min   PT Individual: 30  Missed Time:    Time Missed due to:      Billable Minutes: Gait Training 15 and Therapeutic Exercise 15    11/04/2024

## 2024-11-04 NOTE — PLAN OF CARE
Problem: Physical Therapy  Goal: Physical Therapy Goal  Description: Bed Mobility:  Roll left and right with setup/clean-up assist. - MET  Sit to supine transfer with supervision/touching assist. - MET   Supine to sit transfer with supervision/touching assist. - MET    Transfers:  Sit to stand transfer with supervision/touching assist using RW. - MET  Bed to chair transfer with supervision/touching assist Stand Step  using RW. - MET  Car transfer with partial/moderate assist using RW. - MET   an object from the ground in standing position with partial/moderate assist using RW. - MET    Mobility:  Ambulate 75 feet with partial/moderate assist using RW. - MET  Ambulate 200 feet independently using RW  Ambulate 10 feet on uneven surface/ramp using RW with supervision/touching assist.  Ascend/descend a 4 inch curb independently using a RW  Ascend/descend 4 stairs with supervision/touching assist using flynn rails      Outcome: Progressing

## 2024-11-04 NOTE — PLAN OF CARE
Problem: Occupational Therapy  Goal: Occupational Therapy Goal  Description: ADLs:  MET - Pt to perform grooming tasks with SBA standing at BS sink with LRAD  MET - Pt to perform feeding tasks with independence   MET - Pt to perform UB dressing with setup assist   MET - Pt to perform LB dressing with mod A using AE as needed.  Updated goal: Pt to perform LB dressing with set up using AE as needed.  MET - Pt to perform putting on/off footwear task with max A using AE as needed  MET - Pt to perform toileting with SBA  MET - Pt to perform bathing with mod A using AE as needed.  Updated goal: Pt to perform bathing with set up/ind using AE as needed.    Functional Transfers:  MET - Pt to perform toilet transfers with SBA and BSC over toilet   MET - Pt to perform a tub transfer with CGA and TTB   Pt to perform a walk-in shower transfer with CGA     IADLs:  Pt to perform simple meal prep and light housekeeping with independence and LRAD   Outcome: Progressing

## 2024-11-05 PROCEDURE — 25000003 PHARM REV CODE 250: Performed by: NURSE PRACTITIONER

## 2024-11-05 PROCEDURE — 63600175 PHARM REV CODE 636 W HCPCS: Performed by: NURSE PRACTITIONER

## 2024-11-05 PROCEDURE — 97116 GAIT TRAINING THERAPY: CPT

## 2024-11-05 PROCEDURE — 99900031 HC PATIENT EDUCATION (STAT)

## 2024-11-05 PROCEDURE — 97110 THERAPEUTIC EXERCISES: CPT

## 2024-11-05 PROCEDURE — 94761 N-INVAS EAR/PLS OXIMETRY MLT: CPT

## 2024-11-05 PROCEDURE — 11800000 HC REHAB PRIVATE ROOM

## 2024-11-05 PROCEDURE — 97530 THERAPEUTIC ACTIVITIES: CPT

## 2024-11-05 PROCEDURE — 94799 UNLISTED PULMONARY SVC/PX: CPT

## 2024-11-05 RX ADMIN — METHOCARBAMOL 500 MG: 500 TABLET ORAL at 10:11

## 2024-11-05 RX ADMIN — ENOXAPARIN SODIUM 40 MG: 40 INJECTION SUBCUTANEOUS at 10:11

## 2024-11-05 RX ADMIN — HYDROCODONE BITARTRATE AND ACETAMINOPHEN 1 TABLET: 5; 325 TABLET ORAL at 08:11

## 2024-11-05 RX ADMIN — OXYCODONE HYDROCHLORIDE 5 MG: 5 TABLET ORAL at 11:11

## 2024-11-05 RX ADMIN — SODIUM CHLORIDE 1000 MG: 1 TABLET ORAL at 10:11

## 2024-11-05 RX ADMIN — HYDROXYZINE PAMOATE 25 MG: 25 CAPSULE ORAL at 10:11

## 2024-11-05 RX ADMIN — GABAPENTIN 300 MG: 300 CAPSULE ORAL at 03:11

## 2024-11-05 RX ADMIN — METHOCARBAMOL 500 MG: 500 TABLET ORAL at 03:11

## 2024-11-05 RX ADMIN — METHOCARBAMOL 500 MG: 500 TABLET ORAL at 05:11

## 2024-11-05 RX ADMIN — SODIUM CHLORIDE 1000 MG: 1 TABLET ORAL at 07:11

## 2024-11-05 RX ADMIN — POLYVINYL ALCOHOL, POVIDONE 1 DROP: 14; 6 SOLUTION/ DROPS OPHTHALMIC at 03:11

## 2024-11-05 RX ADMIN — BUSPIRONE HYDROCHLORIDE 5 MG: 5 TABLET ORAL at 03:11

## 2024-11-05 RX ADMIN — ENOXAPARIN SODIUM 40 MG: 40 INJECTION SUBCUTANEOUS at 07:11

## 2024-11-05 RX ADMIN — GABAPENTIN 300 MG: 300 CAPSULE ORAL at 11:11

## 2024-11-05 RX ADMIN — BUSPIRONE HYDROCHLORIDE 5 MG: 5 TABLET ORAL at 11:11

## 2024-11-05 RX ADMIN — GABAPENTIN 300 MG: 300 CAPSULE ORAL at 05:11

## 2024-11-05 RX ADMIN — BUSPIRONE HYDROCHLORIDE 5 MG: 5 TABLET ORAL at 05:11

## 2024-11-05 RX ADMIN — OXYCODONE HYDROCHLORIDE 5 MG: 5 TABLET ORAL at 05:11

## 2024-11-05 NOTE — PT/OT/SLP PROGRESS
"Occupational Therapy Inpatient Rehab Treatment    Name: Jerilyn Fernandez  MRN: [id number]    Assessment:  Jerilyn Fernandez is a 74 y.o. female admitted with a medical diagnosis of Closed fracture of right femur.  She presents with the following impairments/functional limitations:  pain (anxiety).    General Precautions: Standard, fall     Orthopedic Precautions:RLE weight bearing as tolerated     Braces: N/A    Rehab Prognosis: Good; patient would benefit from acute skilled OT services to address these deficits and reach maximum level of function.      History:     No past medical history on file.    Past Surgical History:   Procedure Laterality Date    RETROGRADE INTRAMEDULLARY RODDING OF DISTAL FEMUR Right 10/22/2024    Procedure: INSERTION, INTRAMEDULLARY BHARGAVI, FEMUR, DISTAL, RETROGRADE;  Surgeon: Mingo Sherman MD;  Location: [facility];  Service: Orthopedics;  Laterality: Right;  Supine, flat brigido, traction, triangles  Synthes RFNA  please make second case       Subjective     Orientation: Oriented x4    Chief Complaint: "My leg feels stiff today."    Patient/Family Comments/goals: To go home this Thursday and continue being independent    Respiratory Status: Room air    Patients cultural, spiritual, Adventist conflicts given the current situation: no       Objective:     Patient found up in chair with peripheral IV  upon OT entry to room.    Mobility   Patient completed:  Sit to Stand Transfer with independence with rolling walker  Stand to Sit Transfer with independence with rolling walker    Functional Mobility  Pt ambulated ~150' with RW and independence. No LOB noted.    Therapeutic Activities  Pt stood with RW and independence to fold 1 large blanket and 2 large flat sheets for improved standing balance and tolerance to increase pt's independence with completion of ADLs and IADLs.    Sitting unsupported in wheelchair to improve trunk strength and posture, pt tolerated a coloring activity to improve feelings of " anxiousness and provide a calming effect. Pt thoroughly enjoyed this activity.    LifeStyle Change and Education:     Patient left up in chair with  OT Maddy present.     Education provided: Roles and goals of OT, ADLs, transfer training, body mechanics, assistive device, wheelchair precautions, modified goals, safety precautions, fall prevention, equipment recommendations, and home safety    Multidisciplinary Problems       Occupational Therapy Goals          Problem: Occupational Therapy    Goal Priority Disciplines Outcome Interventions   Occupational Therapy Goal     OT, PT/OT Progressing    Description: ADLs:  MET - Pt to perform grooming tasks with SBA standing at BS sink with LRAD  MET - Pt to perform feeding tasks with independence   MET - Pt to perform UB dressing with setup assist   MET - Pt to perform LB dressing with mod A using AE as needed.  Updated goal: Pt to perform LB dressing with set up using AE as needed.  MET - Pt to perform putting on/off footwear task with max A using AE as needed  MET - Pt to perform toileting with SBA  MET - Pt to perform bathing with mod A using AE as needed.  Updated goal: Pt to perform bathing independently using AE as needed.    Functional Transfers:  MET - Pt to perform toilet transfers with SBA and BSC over toilet   MET - Pt to perform a tub transfer with CGA and TTB   Pt to perform a walk-in shower transfer with CGA     IADLs:  Pt to perform simple meal prep and light housekeeping with independence and LRAD                        Time Tracking     OT Received On: 11/05/24  Time In 1300     Time Out 1400  Total Time 60 min  Therapy Time: OT Individual: 60  Missed Time:    Missed Time Reason:      Billable Minutes: Therapeutic Activity 60    11/05/2024

## 2024-11-05 NOTE — PROGRESS NOTES
11/05/24 0830   Rec Therapy Time Calculation   Date of Treatment 11/05/24   Rec Start Time 0830   Rec Stop Time 0900   Rec Total Time (min) 30 min   Time   Treatment time 2 units   Charges   $Therapeutic Exercise 2 units   Precautions   General Precautions fall   Orthopedic Precautions  RLE weight bearing as tolerated   Braces N/A   Pain/Comfort   Pain Rating 1 3/10   Location - Side 1 Right   Location - Orientation 1 lower   Location 1 hip   Pain Addressed 1 Reposition   OTHER   Rehab identified problem list/impairments weakness;impaired endurance;impaired functional mobility;gait instability;decreased lower extremity function   Values/Beliefs/Spiritual Care   Spiritual, Cultural Beliefs, Restorationist Practices, Values that Affect Care no   Overall Level of Functioning   Activity Tolerance Independent   Dynamic Sitting Balance/Reaching Independent   Dynamic Standing Balance/Reaching Mod Indep   Right UE Coodination/Dexterity Independent   Left UE Coordination/Dexterity Independent   Problem Solving/Sequencing Skills Independent   Memory Recall Independent   R/L Neglect/Inattention Does not occur   Attention Span Independent   Social Interaction Independent   Recreational Therapy Short Term Goals   Short Term Goal 1 Progression Met   Short Term Goal 2 Progression Met   Recreational Therapy Long Term Goals   Long Term Goal 1 Progression Met   Long Term Goal 2 Progression Met   Plan   Patient to be seen Daily   Planned Duration Other (Comment)  (2 days)   Treatments Planned Energy conservation training   Treatment plan/goals estblished with Patient/Caregiver Yes

## 2024-11-05 NOTE — PROGRESS NOTES
Inpatient Nutrition Assessment    Admit Date: 10/28/2024   Total duration of encounter: 8 days   Patient Age: 74 y.o.    Nutrition Recommendation/Prescription     Continue Regular Diet.  Continue Boost Breeze BID (provides 250 kcal and 9 g pro per serving).  Bowel regimen per MD.  Weekly weights.  Will continue to monitor wt, labs, and po intake.    Communication of Recommendations: reviewed with patient    Nutrition Assessment     Malnutrition Assessment/Nutrition-Focused Physical Exam    Malnutrition Context: acute illness or injury (10/28/24 1540)  Malnutrition Level:  (does not meet criteria) (10/28/24 1540)  Energy Intake (Malnutrition): less than or equal to 50% for greater than or equal to 5 days (10/28/24 1540)  Weight Loss (Malnutrition):  (does not meet criteria) (10/28/24 1540)  Subcutaneous Fat (Malnutrition):  (does not meet criteria) (10/28/24 1540)           Muscle Mass (Malnutrition):  (does not meet criteria) (10/28/24 1540)                          Fluid Accumulation (Malnutrition):  (does not meet criteria) (10/28/24 1540)        A minimum of two characteristics is recommended for diagnosis of either severe or non-severe malnutrition.    Chart Review    Reason Seen: follow-up    Malnutrition Screening Tool Results   Have you recently lost weight without trying?: No  Have you been eating poorly because of a decreased appetite?: No   MST Score: 0   Diagnosis:  Closed fracture of right femur 10/21/2024      Fall 10/21/2024      Acute blood loss anemia      Relevant Medical History: HTN, HLD, anxiety and migraines     Scheduled Medications:  busPIRone, 5 mg, TID AC  docusate sodium, 100 mg, BID  enoxaparin, 40 mg, Q12H  gabapentin, 300 mg, TID AC  hydrOXYzine pamoate, 25 mg, QHS  methocarbamoL, 500 mg, Q8H  sodium chloride, 1,000 mg, BID    Continuous Infusions:   PRN Medications:  acetaminophen, 650 mg, Q6H PRN  benzonatate, 100 mg, TID PRN  hydrALAZINE, 10 mg, Q4H PRN  HYDROcodone-acetaminophen, 1  tablet, Daily PRN  labetalol, 10 mg, Q4H PRN  metoprolol, 10 mg, Q2H PRN  nitroGLYCERIN, 0.4 mg, Q5 Min PRN  ondansetron, 4 mg, Q6H PRN  ondansetron, 4 mg, Q8H PRN  oxyCODONE, 5 mg, Q4H PRN  polyethylene glycol, 17 g, Q12H PRN  polyvinyl alcohol-povidon(PF), 1 drop, Q4H PRN    Calorie Containing IV Medications: no significant kcals from medications at this time    Recent Labs   Lab 11/01/24  0516 11/04/24  0548    137   K 3.7 4.0   CALCIUM 9.1 9.0   PHOS  --  3.5   MG  --  1.70    106   CO2 23 23   BUN 10.8 14.7   CREATININE 0.74 0.78   EGFRNORACEVR >60 >60   GLUCOSE 97 101   BILITOT 1.2 1.2   ALKPHOS 200* 180*   ALT 21 18   AST 19 18   ALBUMIN 2.8* 2.9*   PREALB  --  18.6   WBC  --  4.48*   HGB  --  9.7*   HCT  --  30.5*     Nutrition Orders:  Diet Adult Regular  Dietary nutrition supplements BID; Boost Breeze - Any flavor    Appetite/Oral Intake: good/% of meals  Factors Affecting Nutritional Intake: none identified  Social Needs Impacting Access to Food: none identified  Food/Denominational/Cultural Preferences:  not a big fan of red meat  Food Allergies: no known food allergies  Last Bowel Movement: 11/03/24  Wound(s):  surgical incisions    Comments    10/28/24: Pt reports decreased appetite since her surgery (10/23). Eating between 25-50% of meals. Pt states she does not know the cause, she just isn't hungry. Pt denies issues c/s, reports nausea off and on. Denies vomiting, diarrhea, constipation. Pt denies recent unintentional wt loss. Pt agreed to trial ONS. Can switch to Boost Breeze if Boost Plus causes gi intolerance.    11/1/24: Pt reports her appetite and intake are beginning to improve. Stated she is just not a big eater, but the food is very good. Pt denies n/v/d/c; last BM noted yesterday. ONS switched to Boost Breeze and pt enjoying it at lunch. Pt asked for [employer] [place] to be sent with meals. Will speak with dietary staff.     11/5/24: Pt w/ good appetite and intake. Eating between  "% of meals. No gi symptoms reported; last BM noted 2 days ago.    Anthropometrics    Height: 5' 2" (157.5 cm), Height Method: Stated  Last Weight: 62.2 kg (137 lb 3.2 oz) (24 0730), Weight Method: Standard Scale  BMI (Calculated): 25.1  BMI Classification: overweight (BMI 25-29.9)     Ideal Body Weight (IBW), Female: 110 lb     % Ideal Body Weight, Female (lb): 130.27 %                    Usual Body Weight (UBW), k.54 kg  % Usual Body Weight: 109.4     Usual Weight Provided By: patient    Wt Readings from Last 5 Encounters:   24 62.2 kg (137 lb 3.2 oz)   10/22/24 61.2 kg (134 lb 14.7 oz)     Weight Change(s) Since Admission: 6# wt loss in 5 days  Wt Readings from Last 1 Encounters:   24 0730 62.2 kg (137 lb 3.2 oz)   10/28/24 1525 65 kg (143 lb 4.8 oz)   Admit Weight: 65 kg (143 lb 4.8 oz) (10/28/24 1525), Weight Method: Standard Scale    Estimated Needs    Weight Used For Calorie Calculations: 61.2 kg (134 lb 14.7 oz)  Energy Calorie Requirements (kcal): 1106-1491 kcal (30-35 kcal/kg)  Energy Need Method: Kcal/kg  Weight Used For Protein Calculations: 61.2 kg (134 lb 14.7 oz)  Protein Requirements: 74-86 g (1.2-1.4 g/kg)  Fluid Requirements (mL): 1836 mL/d (1.0 mL/kcal)        Enteral Nutrition     Patient not receiving enteral nutrition at this time.    Parenteral Nutrition     Patient not receiving parenteral nutrition support at this time.    Evaluation of Received Nutrient Intake    Calories: not meeting estimated needs  Protein: not meeting estimated needs    Patient Education     Not applicable.    Nutrition Diagnosis     PES: Inadequate oral intake related to acute illness as evidenced by 40% avg po intake x 5 meals. (active)     Nutrition Interventions     Intervention(s): general/healthful diet, commercial beverage, multivitamin/mineral supplement therapy, prescription medication, and collaboration with other providers    Goal: Meet greater than 80% of nutritional needs by " follow-up. (goal progressing)  Goal: Maintain weight throughout hospitalization. (goal progressing)    Nutrition Goals & Monitoring     Dietitian will monitor: food and beverage intake, energy intake, weight, food/nutrition knowledge skill, and gastrointestinal profile  Discharge planning: resume home regimen  Nutrition Risk/Follow-Up: low (follow-up in 5-7 days)   Please consult if re-assessment needed sooner.

## 2024-11-05 NOTE — PLAN OF CARE
Problem: Skin Injury Risk Increased  Goal: Skin Health and Integrity  Outcome: Progressing     Problem: Fall Injury Risk  Goal: Absence of Fall and Fall-Related Injury  Outcome: Progressing     Problem: Wound  Goal: Absence of Infection Signs and Symptoms  Outcome: Progressing     Problem: Wound  Goal: Optimal Functional Ability  Outcome: Progressing     Problem: Wound  Goal: Optimal Pain Control and Function  Outcome: Progressing     Problem: Rehabilitation (IRF) Plan of Care  Goal: Home and Community Transition Plan Established  Outcome: Progressing

## 2024-11-05 NOTE — PROGRESS NOTES
[facility] - Rehab Inpatient Services  Wound Care    Patient Name:  Jerilyn Fernandez   MRN:  [id number]  Date: 11/5/2024  Diagnosis: Closed fracture of right femur    History:     No past medical history on file.    Social History     Socioeconomic History    Marital status:      Social Drivers of Health     Financial Resource Strain: Low Risk  (10/28/2024)    Overall Financial Resource Strain (CARDIA)     Difficulty of Paying Living Expenses: Not very hard   Food Insecurity: No Food Insecurity (10/28/2024)    Hunger Vital Sign     Worried About Running Out of Food in the Last Year: Never true     Ran Out of Food in the Last Year: Never true   Transportation Needs: No Transportation Needs (10/28/2024)    TRANSPORTATION NEEDS     Transportation : No   Stress: No Stress Concern Present (10/28/2024)    [employer] - Occupational Stress Questionnaire     Feeling of Stress : Only a little   Housing Stability: Low Risk  (10/28/2024)    Housing Stability Vital Sign     Unable to Pay for Housing in the Last Year: No     Homeless in the Last Year: No       Precautions:     Allergies as of 10/28/2024    (No Known Allergies)       WO Assessment Details/Treatment      11/05/24 1500        Wound 10/29/24 2000  Right dorsal Toe, first   Date First Assessed/Time First Assessed: 10/29/24 2000   Present on Original Admission: (c) Yes  Primary Wound Type: (c)   Side: Right  Orientation: dorsal  Location: Toe, first   Dressing Appearance Dry;Intact;Clean   Safety Management   Safety Promotion/Fall Prevention assistive device/personal item within reach   Patient Rounds bed in low position   Daily Care   Activity Management Ambulated to bathroom - L4;Up in chair - L3   Positioning   Body Position position maintained     Right dorsal toe: pain from ingrown toenail ? Dressing in place. Patient stated that she is not experiencing any pain with dressing in place.  Continue daily [place]  Bandaid dressing changes.  11/05/2024

## 2024-11-05 NOTE — PT/OT/SLP PROGRESS
"Occupational Therapy Inpatient Rehab Treatment    Name: Jerilyn Fernandez  MRN: [id number]    Assessment:  Jerilyn Fernandez is a 74 y.o. female admitted with a medical diagnosis of Closed fracture of right femur.  She presents with the following impairments/functional limitations:  pain.    General Precautions: Standard, fall     Orthopedic Precautions:RLE weight bearing as tolerated     Braces: N/A    Rehab Prognosis: Good; patient would benefit from acute skilled OT services to address these deficits and reach maximum level of function.      History:     No past medical history on file.    Past Surgical History:   Procedure Laterality Date    RETROGRADE INTRAMEDULLARY RODDING OF DISTAL FEMUR Right 10/22/2024    Procedure: INSERTION, INTRAMEDULLARY BHARGAVI, FEMUR, DISTAL, RETROGRADE;  Surgeon: Mingo Sherman MD;  Location: [facility];  Service: Orthopedics;  Laterality: Right;  Supine, flat brigido, traction, triangles  Synthes RFNA  please make second case       Subjective     Orientation: Oriented x4    Chief Complaint: discomfort in RLE    Patient/Family Comments/goals: "I do feel more relaxed."    Respiratory Status: Room air    Patients cultural, spiritual, Yarsani conflicts given the current situation: no     Objective:     Patient found up in chair with peripheral IV with OTLauren upon OT entry to room.    Mobility   Patient completed:  Sit to Stand Transfer with independence with rolling walker  Stand to Sit Transfer with independence with rolling walker  Chair to Recliner chair Stand Pivot technique with independence with rolling walker    Therapeutic Activities  In unsupported sitting in arm chair to improve trunk strength and posture, pt finished the coloring activity she was working on from previous session. Activity was to improve feelings of anxiousness and provide a calming effect. Pt stated she really enjoyed this activity. OT encourage pt to perform this type of activity at home as a therapeutic use of " self.    Functional Mobility  To continue to reduce anxiety, pt was taken outside to enjoy the afternoon. Pt propelled self in W/C for entire trip (over 300ft) independently to increase overall UB strength for functional mobility with RW. Pt seemed to enjoy the outdoors but did comment on how her arms were fatigued by end of session.    Patient left up in chair with all lines intact and call button in reach.     Education provided: Roles and goals of OT, transfer training, wheelchair precautions, sequencing, safety precautions, fall prevention, and therapeutic use of self.    Multidisciplinary Problems       Occupational Therapy Goals          Problem: Occupational Therapy    Goal Priority Disciplines Outcome Interventions   Occupational Therapy Goal     OT, PT/OT Progressing    Description: ADLs:  MET - Pt to perform grooming tasks with SBA standing at BS sink with LRAD  MET - Pt to perform feeding tasks with independence   MET - Pt to perform UB dressing with setup assist   MET - Pt to perform LB dressing with mod A using AE as needed.  Updated goal: Pt to perform LB dressing with set up using AE as needed.  MET - Pt to perform putting on/off footwear task with max A using AE as needed  MET - Pt to perform toileting with SBA  MET - Pt to perform bathing with mod A using AE as needed.  Updated goal: Pt to perform bathing independently using AE as needed.    Functional Transfers:  MET - Pt to perform toilet transfers with SBA and BSC over toilet   MET - Pt to perform a tub transfer with CGA and TTB   Pt to perform a walk-in shower transfer with CGA     IADLs:  Pt to perform simple meal prep and light housekeeping with independence and LRAD                        Time Tracking     OT Received On: 11/05/24  Time In 1400     Time Out 1430  Total Time 30 min  Therapy Time: OT Individual: 30  Missed Time:    Missed Time Reason:      Billable Minutes: Therapeutic Activity 30    11/05/2024

## 2024-11-05 NOTE — PT/OT/SLP PROGRESS
Recreational Therapy Treatment    Date of Treatment: 11/05/24  Start Time: 0830  Stop Time: 0900  Total Time: 30 min  Missed Time:    General Precautions: fall  Ortho Precautions: RLE weight bearing as tolerated  Braces: N/A    Vitals   Vitals at Rest  BP    HR    O2 Sat    Pain 3/10     Vitals With Activity  BP    HR    O2 Sat    Pain 3/10       Treatment     Cognitive Skills Building   Cognitive Observation Activity Assist Position Equipment Response            Comment:          Dynamic Activities   Activity Assist Position Equipment Response   Activity 1 Washer toss modified independence Standing Rolling walker and Metal washers good   Comment: Sit to stand was setup/I as was dynamic standing balance/reaching.  Standing tolerance was 3 minutes with sitting rest breaks.  UE coordination and sequencing skills were I.  Motivated and determined       Fine Motor Activities   Activity Assist Position Equipment Response           Comment:          Additional Info: Recliner to w/c transfer was setup    Goals     Short Term Goals    Goal  Goal Status   Will increase sit to stand to supervision Met   Will improve dynamic standing balance/reaching to supervision Met                 Long Term Goals    Goal Goal Status   Will increase standing tolerance to 5,10 minutes Met   Will improve dynamic standing balance/reaching to setup/I Met

## 2024-11-05 NOTE — PROGRESS NOTES
[facility] ([facility])  Rehab Progress Note    Patient Name: Jerilyn Fernandez  MRN: [id number]  Age: 74 y.o. Sex: female  : 1950  Hospital Length of Stay: 8 days  Date of Service: 2024   Chief Complaint: Right femur fracture 2/2 fall s/p IM stabilization on 10/22     Subjective:     Basic Information  Admit Information: 74-year-old WF presented to [facility] on 10/21 via EMS with complaints of right-sided hip pain after slipping and falling at home.  PMH significant for hypertension and hyperlipidemia.  Workup significant for right femur fracture.  On 10/22 tolerated IM stabilization without perioperative complications.  Postoperatively reported SVT requiring esmolol then transition to normal sinus rhythm.  Initiated metoprolol tartrate 12.5 mg b.i.d. per Cardiology.  Continued with orthostatic hypotension and trauma surgery held beta-blockers and Flomax.  Required 1 unit PRBC transfusion on 10/26. Tolerated transfer to [facility] inpatient rehab unit on 10/28 without incident.   Today's Information: No acute events overnight.  Mobilizing in wheelchair.  Reports good sleep and appetite.  Last BM 11/3.  Vital signs at goal with no recorded fevers.  No new labs or imaging today.    Review of patient's allergies indicates:  No Known Allergies     Current Facility-Administered Medications:     acetaminophen tablet 650 mg, 650 mg, Oral, Q6H PRN, Briana Andrade, FNP, 650 mg at 10/31/24 0555    benzonatate capsule 100 mg, 100 mg, Oral, TID PRN, Briana Andrade, FNP    busPIRone tablet 5 mg, 5 mg, Oral, TID ACNadeem Kathryn A, FNP, 5 mg at 24 0526    docusate sodium capsule 100 mg, 100 mg, Oral, BID, Briana Andrade, FNP, 100 mg at 24 0822    enoxaparin injection 40 mg, 40 mg, Subcutaneous, Q12H, Briana Andrade, FNP, 40 mg at 24 0756    gabapentin capsule 300 mg, 300 mg, Oral, TID ACNadeem Kathryn A, FNP, 300 mg at 24 0526    hydrALAZINE injection 10 mg, 10  "mg, Intravenous, Q4H PRN, Briana Andrade B, FNP    HYDROcodone-acetaminophen 5-325 mg per tablet 1 tablet, 1 tablet, Oral, Daily PRN, Briana Andrade, FNP, 1 tablet at 11/05/24 0842    hydrOXYzine pamoate capsule 25 mg, 25 mg, Oral, QHS, Rashad Andradeen B, FNP, 25 mg at 11/04/24 2129    labetalol 20 mg/4 mL (5 mg/mL) IV syring, 10 mg, Intravenous, Q4H PRN, Rashad Andradeen B, FNP    methocarbamoL tablet 500 mg, 500 mg, Oral, Q8H, Briana Andrade B, FNP, 500 mg at 11/05/24 0526    metoprolol injection 10 mg, 10 mg, Intravenous, Q2H PRN, Briana Andrade B, FNP    nitroGLYCERIN SL tablet 0.4 mg, 0.4 mg, Sublingual, Q5 Min PRN, Briana Andrade B, FNP    ondansetron disintegrating tablet 4 mg, 4 mg, Oral, Q6H PRN, Briana Andrade B, FNP    ondansetron injection 4 mg, 4 mg, Intravenous, Q8H PRN, Briana Andrade, FNP    oxyCODONE immediate release tablet 5 mg, 5 mg, Oral, Q4H PRN, Briana Andrade B, FNP, 5 mg at 11/04/24 2128    polyethylene glycol packet 17 g, 17 g, Oral, Q12H PRN, Briana Andrade B, FNP    polyvinyl alcohol-povidon(PF) 1.4-0.6 % Dpet 1 drop, 1 drop, Ophthalmic, Q4H PRN, Briana Andrade B, FNP, 1 drop at 11/04/24 1636    sodium chloride oral tablet 1,000 mg, 1,000 mg, Oral, BID, Rashad Andradeen B, FNP, 1,000 mg at 11/05/24 0756     Review of Systems   Complete 12-point review of symptoms negative except for what's mentioned in HPI     Objective:     /73   Pulse 81   Temp 98 °F (36.7 °C) (Oral)   Resp 18   Ht 5' 2" (1.575 m)   Wt 62.2 kg (137 lb 3.2 oz)   SpO2 98%   BMI 25.09 kg/m²      Physical Exam  Vitals reviewed.   Eyes:      Pupils: Pupils are equal, round, and reactive to light.   Cardiovascular:      Rate and Rhythm: Normal rate and regular rhythm.      Heart sounds: Normal heart sounds.   Pulmonary:      Effort: Pulmonary effort is normal.      Breath sounds: Normal breath sounds.   Abdominal:      General: Bowel sounds are normal.   Musculoskeletal:         General: Normal range " of motion.      Right lower le+ Edema present.   Skin:     General: Skin is warm.      Comments: Right lower extremity incision dry and intact    Neurological:      General: No focal deficit present.      Mental Status: She is alert and oriented to person, place, and time.      Motor: Weakness present.   Psychiatric:         Mood and Affect: Mood normal.     *MD performed and documented physical examination       Lines/Drains/Airways       Peripheral Intravenous Line  Duration                  Peripheral IV - Single Lumen 10/21/24 1136 20 G Anterior;Right Forearm 14 days                    Labs  No results found for this or any previous visit (from the past 24 hours).    Radiology  Right femur x-ray 10/22/2024:  Impression:  Interval insertion of IM carina.  Suprapatellar effusion.    Assessment/Plan:     74 y.o. WF admitted on 10/28/2024     Right femur fracture 2/ fall   - s/p IM stabilization on 10/22  - RLE WBAT, full ROM  - discontinued staples on   - continue                Gabapentin 300 mg t.i.d.                Robaxin 500 mg every 8 hours  - follow-up with orthopedic surgery outpatient     Hyponatremia  - stable  - continue                Sodium chloride 1000 mg b.i.d. (decreased )     Constipation   - Stable   - Continue                 Colace 100 mg b.i.d.     Anemia  - asymptomatic  - s/p transfusion                x1 units PRBC on 10/26/2024  - H/H stable   - no evidence of active bleeds  - will closely monitor and transfuse if needed      SVT  - currently in normal sinus rhythm  - held metoprolol tartrate 12.5 mg b.i.d. 2/2 reported orthostatic hypotension  - follow-up with cardiology outpatient     HLD  - FLP outpatient  - not currently on statin     HFpEF  - EF 65% with grade 1 diastolic dysfunction  - follow-up with cardiology outpatient     VHD  - mild MR/MS/TR  - follow-up with cardiology outpatient     Major depressive disorder  - current  - continue   BuSpar 5 mg t.i.d. (initiated  10/29)    Insomnia  - current  - continue   Vistaril 50 mg at bedtime scheduled     VTE Prophylaxis:  Lovenox 40 mg b.i.d.     POA: no  Living will: no  Contacts: Brooks Fernandez (spouse) [phone]     CODE STATUS: Full  Internal Medicine (attending): Alexander Kitchen MD  Physiatry (consulting):  Isaiah Navas MD     OUTPATIENT PROVIDERS  PCP: DEX Kenny  Cardiology: Doni Barnhart MD  Orthopedic surgery: Mingo Sherman MD     DISPOSITION:  Sleep hygiene, bowel maintenance, and appetite at goal.  Last BM 11/3.  Vital signs at with no recorded fevers.  No new labs or imaging today.  Continue current POC.  Monitor closely.  Notify of acute changes.    Staffing 11/4/2024: Continent of bowel and bladder.  Incision looks good. RLE edema improving.  RT: Set up to independent.  Limited by anxiety and pain.  Appetite improving.  Tolerating boost breeze.  PT: Set up to independent.  Tolerating 235 feet with RW.  Self-limited at times.  OT: Independent to supervision with Adls.  Needs reassurance.  Projected discharge 11/7 home with outpatient.      Dong Kumar NP conducted independent physical examination and assisted with medical documentation.

## 2024-11-05 NOTE — PT/OT/SLP PROGRESS
"Physical Therapy Inpatient Rehab Treatment    Patient Name:  Jerilyn Fernandez   MRN:  [id number]    Recommendations:     Discharge Recommendations:  Low Intensity Therapy   Discharge Equipment Recommendations:     Barriers to discharge: Impaired functional mobility     Assessment:     Jerilyn Fernandez is a 74 y.o. female admitted with a medical diagnosis of Closed fracture of right femur.  She presents with the following impairments/functional limitations:  weakness, impaired endurance, impaired functional mobility, gait instability, impaired balance, impaired cognition, decreased lower extremity function, pain, decreased ROM, edema, orthopedic precautions.    Rehab Diagnosis: Fall in shower, right femoral shaft fracture s/p IM rodding 10/22/2024. Pt. Has a history of HTN, HLD, anxiety, and migraines.    General Precautions: Standard, fall     Orthopedic Precautions:RLE weight bearing as tolerated (ROMAT)     Braces: N/A    Rehab Prognosis: Good; patient would benefit from acute skilled PT services to address these deficits and reach maximum level of function.      History:     No past medical history on file.    Past Surgical History:   Procedure Laterality Date    RETROGRADE INTRAMEDULLARY RODDING OF DISTAL FEMUR Right 10/22/2024    Procedure: INSERTION, INTRAMEDULLARY BHARGAVI, FEMUR, DISTAL, RETROGRADE;  Surgeon: Mingo Sherman MD;  Location: [facility];  Service: Orthopedics;  Laterality: Right;  Supine, flat brigido, traction, triangles  Synthes RFNA  please make second case       Subjective     Patient comments: "Will it hurt to take the staples out?"    Respiratory Status: Room air    Patients cultural, spiritual, Restoration conflicts given the current situation: no    Objective:     Patient found up in chair with peripheral IV  upon PT entry to room.    Pt is  Anxious, Alert, Cooperative, and Motivated.        Functional Mobility:      Current   Status  Discharge   Goal   Functional Area: Care Score:    Sit to Stand 6  Ind " with RW Independent   Walk 10 Feet 6  Ind with RW Independent   Walk 50 Feet with Two Turns 6  Ind with RW Independent   Walk 150 Feet 6  Ind with RW, ~400ft. Improved endurance noted with walking Supervision or touching assistance       Therapeutic Activities and Exercises:  Reviewed HEP, provided handout. Also educated pt on importance of frequent mobility at home.    Activity Tolerance: Good    Patient left up in chair with call button in reach.    Education provided: gait training, body mechanics, assistive device, and strengthening exercises    Expected compliance: Moderate compliance    Plan:     During this hospitalization, patient to be seen 5 x/week (5-7 x/week) to address the identified rehab impairments via gait training, therapeutic activities, therapeutic exercises, neuromuscular re-education, wheelchair management/training and progress toward the following goals:    GOALS:   Multidisciplinary Problems       Physical Therapy Goals          Problem: Physical Therapy    Goal Priority Disciplines Outcome Interventions   Physical Therapy Goal     PT, PT/OT Progressing    Description: Bed Mobility:  Roll left and right with setup/clean-up assist. - MET  Sit to supine transfer with supervision/touching assist. - MET   Supine to sit transfer with supervision/touching assist. - MET    Transfers:  Sit to stand transfer with supervision/touching assist using RW. - MET  Bed to chair transfer with supervision/touching assist Stand Step  using RW. - MET  Car transfer with partial/moderate assist using RW. - MET   an object from the ground in standing position with partial/moderate assist using RW. - MET    Mobility:  Ambulate 75 feet with partial/moderate assist using RW. - MET  Ambulate 200 feet independently using RW  Ambulate 10 feet on uneven surface/ramp using RW with supervision/touching assist.  Ascend/descend a 4 inch curb independently using a RW  Ascend/descend 4 stairs with supervision/touching  assist using flynn rails                           Plan of Care Expires:  11/15/24  PT Next Visit Date: 11/08/24  Plan of Care reviewed with: patient    Additional Information:         Time Tracking:     Therapy Time  PT Start Time: 1030  PT Stop Time: 1100  PT Total Time (min): 30 min   PT Individual: 30  Missed Time:    Time Missed due to:      Billable Minutes: Gait Training 30    11/05/2024

## 2024-11-05 NOTE — PT/OT/SLP PROGRESS
Physical Therapy Inpatient Rehab Treatment    Patient Name:  Jerilyn Fernandez   MRN:  [id number]    Recommendations:     Discharge Recommendations:  Low Intensity Therapy   Discharge Equipment Recommendations:     Barriers to discharge: None    Assessment:     Jerilyn Fernandez is a 74 y.o. female admitted with a medical diagnosis of Closed fracture of right femur.  She presents with the following impairments/functional limitations:  weakness, impaired endurance, impaired functional mobility, gait instability, impaired balance, impaired cognition, decreased lower extremity function, pain, decreased ROM, edema, orthopedic precautions.    Rehab Diagnosis: Fall in shower, right femoral shaft fracture s/p IM rodding 10/22/2024. Pt. Has a history of HTN, HLD, anxiety, and migraines.    General Precautions: Standard, fall     Orthopedic Precautions:RLE weight bearing as tolerated (ROMAT)     Braces: N/A    Rehab Prognosis: Good; patient would benefit from acute skilled PT services to address these deficits and reach maximum level of function.      History:     No past medical history on file.    Past Surgical History:   Procedure Laterality Date    RETROGRADE INTRAMEDULLARY RODDING OF DISTAL FEMUR Right 10/22/2024    Procedure: INSERTION, INTRAMEDULLARY BHARGAVI, FEMUR, DISTAL, RETROGRADE;  Surgeon: Mingo Sherman MD;  Location: [facility];  Service: Orthopedics;  Laterality: Right;  Supine, flat brigido, traction, triangles  Synthes RFNA  please make second case       Subjective     Patient comments: Pt more trusting of PT and willing to attempt more difficult activities this session    Respiratory Status: Room air    Patients cultural, spiritual, Roman Catholic conflicts given the current situation: no    Objective:   Patient found up in chair with peripheral IV  upon PT entry to room.    Pt is  Anxious, Alert, Cooperative, and Motivated.        Functional Mobility:      Current   Status  Discharge   Goal   Functional Area: Care Score:   "  Sit to Lying 6  Ind, no leg  required Independent   Sit to Stand 6  Ind with RW Independent   Walk 10 Feet 6  Ind with RW Independent   Walk 50 Feet with Two Turns 6  Ind with RW Independent   Walk 150 Feet 6  Ind with RW, 150ft Supervision or touching assistance   Walk 10 Feet Uneven Surface 4  CGA with RW on ramp 10ft Supervision or touching assistance   1 Step (Curb) 6  Ind with RW on 4" step, 2 trials Supervision or touching assistance       Therapeutic Activities and Exercises:  - Toilet, Independent with RW, stand step  - Modified sit-ups 2x15  - Balance: Standing on blue foam, pt performed feet together x30sec, semi-tandem [id number] flynn.       Activity Tolerance: Good    Patient left supine with call button in reach and Aliza, NSG present.    Education provided: gait training, balance training, safety awareness, body mechanics, assistive device, and strengthening exercises    Expected compliance: Moderate compliance    Plan:     During this hospitalization, patient to be seen 5 x/week (5-7 x/week) to address the identified rehab impairments via gait training, therapeutic activities, therapeutic exercises, neuromuscular re-education, wheelchair management/training and progress toward the following goals:    GOALS:   Multidisciplinary Problems       Physical Therapy Goals          Problem: Physical Therapy    Goal Priority Disciplines Outcome Interventions   Physical Therapy Goal     PT, PT/OT Progressing    Description: Bed Mobility:  Roll left and right with setup/clean-up assist. - MET  Sit to supine transfer with supervision/touching assist. - MET   Supine to sit transfer with supervision/touching assist. - MET    Transfers:  Sit to stand transfer with supervision/touching assist using RW. - MET  Bed to chair transfer with supervision/touching assist Stand Step  using RW. - MET  Car transfer with partial/moderate assist using RW. - MET   an object from the ground in standing position with " partial/moderate assist using RW. - MET    Mobility:  Ambulate 75 feet with partial/moderate assist using RW. - MET  Ambulate 200 feet independently using RW  Ambulate 10 feet on uneven surface/ramp using RW with supervision/touching assist.  Ascend/descend a 4 inch curb independently using a RW  Ascend/descend 4 stairs with supervision/touching assist using flynn rails                           Plan of Care Expires:  11/15/24  PT Next Visit Date: 11/08/24  Plan of Care reviewed with: patient    Additional Information:         Time Tracking:     Therapy Time  PT Start Time: 1445  PT Stop Time: 1545  PT Total Time (min): 60 min   PT Individual: 60  Missed Time:    Time Missed due to:      Billable Minutes: Gait Training 10, Therapeutic Activity 30, and Therapeutic Exercise 20    11/05/2024

## 2024-11-06 PROCEDURE — 97535 SELF CARE MNGMENT TRAINING: CPT

## 2024-11-06 PROCEDURE — 99900035 HC TECH TIME PER 15 MIN (STAT)

## 2024-11-06 PROCEDURE — 97530 THERAPEUTIC ACTIVITIES: CPT

## 2024-11-06 PROCEDURE — 99900031 HC PATIENT EDUCATION (STAT)

## 2024-11-06 PROCEDURE — 94761 N-INVAS EAR/PLS OXIMETRY MLT: CPT

## 2024-11-06 PROCEDURE — 25000003 PHARM REV CODE 250: Performed by: NURSE PRACTITIONER

## 2024-11-06 PROCEDURE — 11800000 HC REHAB PRIVATE ROOM

## 2024-11-06 PROCEDURE — 94799 UNLISTED PULMONARY SVC/PX: CPT

## 2024-11-06 PROCEDURE — 97112 NEUROMUSCULAR REEDUCATION: CPT

## 2024-11-06 PROCEDURE — 63600175 PHARM REV CODE 636 W HCPCS: Performed by: NURSE PRACTITIONER

## 2024-11-06 PROCEDURE — 97110 THERAPEUTIC EXERCISES: CPT

## 2024-11-06 RX ADMIN — SODIUM CHLORIDE 1000 MG: 1 TABLET ORAL at 08:11

## 2024-11-06 RX ADMIN — GABAPENTIN 300 MG: 300 CAPSULE ORAL at 04:11

## 2024-11-06 RX ADMIN — HYDROXYZINE PAMOATE 25 MG: 25 CAPSULE ORAL at 08:11

## 2024-11-06 RX ADMIN — ENOXAPARIN SODIUM 40 MG: 40 INJECTION SUBCUTANEOUS at 08:11

## 2024-11-06 RX ADMIN — BUSPIRONE HYDROCHLORIDE 5 MG: 5 TABLET ORAL at 05:11

## 2024-11-06 RX ADMIN — METHOCARBAMOL 500 MG: 500 TABLET ORAL at 01:11

## 2024-11-06 RX ADMIN — ACETAMINOPHEN 650 MG: 325 TABLET ORAL at 08:11

## 2024-11-06 RX ADMIN — BUSPIRONE HYDROCHLORIDE 5 MG: 5 TABLET ORAL at 04:11

## 2024-11-06 RX ADMIN — METHOCARBAMOL 500 MG: 500 TABLET ORAL at 08:11

## 2024-11-06 RX ADMIN — GABAPENTIN 300 MG: 300 CAPSULE ORAL at 11:11

## 2024-11-06 RX ADMIN — GABAPENTIN 300 MG: 300 CAPSULE ORAL at 05:11

## 2024-11-06 RX ADMIN — DOCUSATE SODIUM 100 MG: 100 CAPSULE, LIQUID FILLED ORAL at 08:11

## 2024-11-06 RX ADMIN — BUSPIRONE HYDROCHLORIDE 5 MG: 5 TABLET ORAL at 11:11

## 2024-11-06 RX ADMIN — ACETAMINOPHEN 650 MG: 325 TABLET ORAL at 06:11

## 2024-11-06 RX ADMIN — POLYVINYL ALCOHOL, POVIDONE 1 DROP: 14; 6 SOLUTION/ DROPS OPHTHALMIC at 08:11

## 2024-11-06 RX ADMIN — METHOCARBAMOL 500 MG: 500 TABLET ORAL at 05:11

## 2024-11-06 NOTE — PT/OT/SLP DISCHARGE
Recreational Therapy Discharge      Date of Treatment: 11/06/24  Start Time: 1400  Stop Time: 1430  Total Time: 30 min  Missed Time:    Assessment      Jerilyn Fernandez is a 74 y.o. female admitted with a medical diagnosis of Closed fracture of right femur.  She presents with the following impairments/functional limitations:  weakness, impaired functional mobility, gait instability, decreased lower extremity function .    Rehab Diagnosis:     Recent Surgery:    General Precautions: Standard, fall     Orthopedic Precautions:RLE weight bearing as tolerated     Braces: N/A    Rehab Prognosis: Good; patient would benefit from acute skilled Recreational Therapy services to address these deficits and reach maximum level of function.      Impairments: Endurance deficits, Mobility deficits, and Strength deficits  Rehab Potential: Good  Treatment Recommendations: Complete discharge plan  Treatment Diagnosis: Fall in shower, R femoral shaft fx, IMrodding, HTN, anxiety, migraines  Orientation: Oriented x4  Affect/Behavior: Appropriate and Cooperative  Safety/Judgement: intact   Basic Command Following: intact  Spiritual Cultural: no        History     No past medical history on file.    Past Surgical History:   Procedure Laterality Date    RETROGRADE INTRAMEDULLARY RODDING OF DISTAL FEMUR Right 10/22/2024    Procedure: INSERTION, INTRAMEDULLARY BHARGAVI, FEMUR, DISTAL, RETROGRADE;  Surgeon: Mingo Sherman MD;  Location: [facility];  Service: Orthopedics;  Laterality: Right;  Supine, flat brigido, traction, triangles  Synthes RFNA  please make second case       Home Environment     Admit Date: 10/28/24  Living Situation  People in Home: spouse  Name(s) of People in Home: Brooks  Lives in: house  Patients Responsibilities: , Financial management, Health and wellness, Laundry, Leisure/play/hobbies, Meal preparation, Retired, Shopping  Number of Children: 0  Occupation:Retired: [employer]    Instrumental Activities of Daily Living  "    Previous Hand Dominance: Right Current Hand Dominance: Right     Other iADL Information:        Cognitive Skills Building         Cognitive Observation Activity Assist Position Equipment Response            Comment:      Dynamic Activities      Activity Assist Position Equipment Response   Activity 1 Bocce ball independence and modified independence Standing Rolling walker and Bocce balls good   Comment: W/c to bed transfer was setup.  Sit to stand was setup/I as was dynamic standing balance/reaching.  Standing tolerance was 10 minutes.  UE coordination was I as were problem solving skills.  Remains motivated and determined.  Anxious at times       Fine Motor Activities      Activity Assist Position Equipment Response           Comment:        Goals     Patient Goals  Patient Goal 1: "Get stronger and move stronger."    Short Term Goals    Goal  Goal Status   Will increase sit to stand to supervision Met   Will improve dynamic standing balance/reaching to supervision Met                 Long Term Goals    Goal Goal Status   Will increase standing tolerance to 5,10 minutes Met   Will improve dynamic standing balance/reaching to setup/I Met                     Plan       Patient to be seen: Daily  Duration: Other (Comment) (1 day)  Treatments planned: Energy conservation training  Treatment plan/goals established with Patient/Caregiver: Yes     "

## 2024-11-06 NOTE — PLAN OF CARE
Shower chair and BSC were delivered to the home, and RW will be delivered here tomorrow (per [facility]).

## 2024-11-06 NOTE — PLAN OF CARE
11/06/24 0829   Depression Screen (Over the Past Two Weeks)   Have You Felt Down, Depressed or Hopeless? no   Have You Felt Little Interest or Pleasure in Doing Things? no     Discharge PHQ

## 2024-11-06 NOTE — PROGRESS NOTES
[facility] ([facility])  Rehab Progress Note    Patient Name: Jerilyn Fernandez  MRN: [id number]  Age: 74 y.o. Sex: female  : 1950  Hospital Length of Stay: 9 days  Date of Service: 2024   Chief Complaint: Right femur fracture 2/2 fall s/p IM stabilization on 10/22     Subjective:     Basic Information  Admit Information: 74-year-old WF presented to [facility] on 10/21 via EMS with complaints of right-sided hip pain after slipping and falling at home.  PMH significant for hypertension and hyperlipidemia.  Workup significant for right femur fracture.  On 10/22 tolerated IM stabilization without perioperative complications.  Postoperatively reported SVT requiring esmolol then transition to normal sinus rhythm.  Initiated metoprolol tartrate 12.5 mg b.i.d. per Cardiology.  Continued with orthostatic hypotension and trauma surgery held beta-blockers and Flomax.  Required 1 unit PRBC transfusion on 10/26. Tolerated transfer to [facility] inpatient rehab unit on 10/28 without incident.   Today's Information: No acute events overnight.  Sitting up in chair.  Reports good sleep and appetite.  Last BM .  Vital signs at goal with no recorded fevers.  No new labs or imaging today.    Review of patient's allergies indicates:  No Known Allergies     Current Facility-Administered Medications:     acetaminophen tablet 650 mg, 650 mg, Oral, Q6H PRN, Briana Andrade, FNP, 650 mg at 24 0802    benzonatate capsule 100 mg, 100 mg, Oral, TID PRN, Briana Andrade, FNP    busPIRone tablet 5 mg, 5 mg, Oral, TID AC, Catalina Silver A, FNP, 5 mg at 24 0516    docusate sodium capsule 100 mg, 100 mg, Oral, BID, Briana Andrade, FNP, 100 mg at 24 0801    enoxaparin injection 40 mg, 40 mg, Subcutaneous, Q12H, Briana Andrade, FNP, 40 mg at 24 0801    gabapentin capsule 300 mg, 300 mg, Oral, TID AC, Catalina Silver A, FNP, 300 mg at 24 0516    hydrALAZINE injection 10 mg, 10 mg,  "Intravenous, Q4H PRN, Briana Andrdae B, FNP    HYDROcodone-acetaminophen 5-325 mg per tablet 1 tablet, 1 tablet, Oral, Daily PRN, Briana Andrade, FNP, 1 tablet at 11/05/24 0842    hydrOXYzine pamoate capsule 25 mg, 25 mg, Oral, QHS, Rashad Andradeen B, FNP, 25 mg at 11/05/24 2205    labetalol 20 mg/4 mL (5 mg/mL) IV syring, 10 mg, Intravenous, Q4H PRN, Rashad Andradeen B, FNP    methocarbamoL tablet 500 mg, 500 mg, Oral, Q8H, Rashad Andradeen B, FNP, 500 mg at 11/06/24 0516    metoprolol injection 10 mg, 10 mg, Intravenous, Q2H PRN, Briana Andrade B, FNP    nitroGLYCERIN SL tablet 0.4 mg, 0.4 mg, Sublingual, Q5 Min PRN, Briana Andrade B, FNP    ondansetron disintegrating tablet 4 mg, 4 mg, Oral, Q6H PRN, Briana Andrade B, FNP    ondansetron injection 4 mg, 4 mg, Intravenous, Q8H PRN, Briana Andrade B, FNP    oxyCODONE immediate release tablet 5 mg, 5 mg, Oral, Q4H PRN, Rashad Andradeen B, FNP, 5 mg at 11/05/24 1749    polyethylene glycol packet 17 g, 17 g, Oral, Q12H PRN, Briana Andrade B, FNP    polyvinyl alcohol-povidon(PF) 1.4-0.6 % Dpet 1 drop, 1 drop, Ophthalmic, Q4H PRN, Rashad Andradeen B, FNP, 1 drop at 11/05/24 1559    sodium chloride oral tablet 1,000 mg, 1,000 mg, Oral, BID, Rashad Andradeen B, FNP, 1,000 mg at 11/06/24 0802     Review of Systems   Complete 12-point review of symptoms negative except for what's mentioned in HPI     Objective:     /77 (Patient Position: Lying)   Pulse 79   Temp 98.8 °F (37.1 °C) (Oral)   Resp 18   Ht 5' 2" (1.575 m)   Wt 62.2 kg (137 lb 3.2 oz)   SpO2 95%   BMI 25.09 kg/m²      Physical Exam  Vitals reviewed.   Eyes:      Pupils: Pupils are equal, round, and reactive to light.   Cardiovascular:      Rate and Rhythm: Normal rate and regular rhythm.      Heart sounds: Normal heart sounds.   Pulmonary:      Effort: Pulmonary effort is normal.      Breath sounds: Normal breath sounds.   Abdominal:      General: Bowel sounds are normal.   Musculoskeletal:        "  General: Normal range of motion.      Right lower le+ Edema present.   Skin:     General: Skin is warm.      Comments: Right lower extremity incision dry and intact    Neurological:      General: No focal deficit present.      Mental Status: She is alert and oriented to person, place, and time.      Motor: Weakness present.   Psychiatric:         Mood and Affect: Mood normal.     *MD performed and documented physical examination       Lines/Drains/Airways       Peripheral Intravenous Line  Duration                  Peripheral IV - Single Lumen 10/21/24 1136 20 G Anterior;Right Forearm 15 days                    Labs  No results found for this or any previous visit (from the past 24 hours).    Radiology  Right femur x-ray 10/22/2024:  Impression:  Interval insertion of IM carina.  Suprapatellar effusion.    Assessment/Plan:     74 y.o. WF admitted on 10/28/2024     Right femur fracture 2/2 fall   - s/p IM stabilization on 10/22  - RLE WBAT, full ROM  - discontinued staples on   - continue                Gabapentin 300 mg t.i.d.                Robaxin 500 mg every 8 hours  - follow-up with orthopedic surgery outpatient     Hyponatremia  - stable  - continue                Sodium chloride 1000 mg b.i.d. (decreased )     Constipation   - Stable   - Continue                 Colace 100 mg b.i.d.     Anemia  - asymptomatic  - s/p transfusion                x1 units PRBC on 10/26/2024  - H/H stable   - no evidence of active bleeds  - will closely monitor and transfuse if needed      SVT  - currently in normal sinus rhythm  - held metoprolol tartrate 12.5 mg b.i.d. 2/ reported orthostatic hypotension  - follow-up with cardiology outpatient     HLD  - FLP outpatient  - not currently on statin     HFpEF  - EF 65% with grade 1 diastolic dysfunction  - follow-up with cardiology outpatient     VHD  - mild MR/MS/TR  - follow-up with cardiology outpatient     Major depressive disorder  - current  - continue   BuSpar 5 mg  t.i.d. (initiated 10/29)    Insomnia  - current  - continue   Vistaril 50 mg at bedtime scheduled     VTE Prophylaxis:  Lovenox 40 mg b.i.d.     POA: no  Living will: no  Contacts: Brooks Fernandez (spouse) [phone]     CODE STATUS: Full  Internal Medicine (attending): Alexander Kitchen MD  Physiatry (consulting):  Isaiah Navas MD     OUTPATIENT PROVIDERS  PCP: DEX Kenny  Cardiology: Doni Barnhart MD  Orthopedic surgery: Mingo Sherman MD     DISPOSITION:  Sleep hygiene, bowel maintenance, and appetite at goal.   Vital signs at with no recorded fevers.  No new labs or imaging today.  Continue current POC.  Monitor closely.  Notify of acute changes.    Staffing 11/4/2024: Continent of bowel and bladder.  Incision looks good. RLE edema improving.  RT: Set up to independent.  Limited by anxiety and pain.  Appetite improving.  Tolerating boost breeze.  PT: Set up to independent.  Tolerating 235 feet with RW.  Self-limited at times.  OT: Independent to supervision with Adls.  Needs reassurance.  Projected discharge 11/7 home with outpatient.      Dong Kumar NP conducted independent physical examination and assisted with medical documentation.

## 2024-11-06 NOTE — PT/OT/SLP PROGRESS
"Physical Therapy Inpatient Rehab Treatment    Patient Name:  Jerilyn Fernandez   MRN:  [id number]    Recommendations:     Discharge Recommendations:  Low Intensity Therapy   Discharge Equipment Recommendations:     Barriers to discharge: None    Assessment:     Jerilyn Fernandez is a 74 y.o. female admitted with a medical diagnosis of Closed fracture of right femur.  She presents with the following impairments/functional limitations:  weakness, impaired endurance, impaired functional mobility, gait instability, impaired balance, impaired cognition, decreased lower extremity function, pain, decreased ROM, edema, orthopedic precautions.    Rehab Diagnosis: Fall in shower, right femoral shaft fracture s/p IM rodding 10/22/2024. Pt. Has a history of HTN, HLD, anxiety, and migraines.    General Precautions: Standard, fall     Orthopedic Precautions:RLE weight bearing as tolerated (ROMAT)     Braces: N/A    Rehab Prognosis: Good; patient would benefit from acute skilled PT services to address these deficits and reach maximum level of function.      History:     No past medical history on file.    Past Surgical History:   Procedure Laterality Date    RETROGRADE INTRAMEDULLARY RODDING OF DISTAL FEMUR Right 10/22/2024    Procedure: INSERTION, INTRAMEDULLARY BHARGAVI, FEMUR, DISTAL, RETROGRADE;  Surgeon: Mingo Sherman MD;  Location: [facility];  Service: Orthopedics;  Laterality: Right;  Supine, flat brigido, traction, triangles  Synthes RFNA  please make second case       Subjective     Patient comments: "I get nervous about stuff"    Respiratory Status: Room air    Patients cultural, spiritual, Druze conflicts given the current situation: no    Objective:     Patient found up in chair with peripheral IV  upon PT entry to room.    Pt is  Anxious, Alert, Cooperative, and Motivated.      Functional Mobility:      Current   Status  Discharge   Goal   Functional Area: Care Score:    Roll Left and Right 6  Ind, HOB flat Independent   Sit to " "Lying 6  Ind, HOB flat, height at 28.5" to simulate home Independent   Lying to Sitting on Side of Bed 6  Ind, HOB flat, height at 28.5" to simulate home Independent   Sit to Stand 6  Ind with RW Independent   Chair/Bed-to-Chair Transfer 6  Ind with RW, stand step Independent   Car Transfer 6  Ind with RW, stand step Independent   Walk 10 Feet 6  Ind with RW Independent   Walk 50 Feet with Two Turns 6  Ind with RW Independent   Walk 150 Feet 6  Ind with RW, 150ft Supervision or touching assistance   Picking Up Object 6  Ind with RW, no reacher, picking up pen from ground.  Set-up/clean-up       Therapeutic Activities and Exercises:  PT Family Training session performed with pt's , Brooks, present. Answered all questions/concerns as appropriate. Discussed pt's progress and future therapy needs.       Activity Tolerance: Good    Patient left up in chair with call button in reach, Maddy, OT notified, and Brooks present.    Education provided: roles and goals of PT/PTA, bed mob, gait training, safety awareness, body mechanics, assistive device, and strengthening exercises    Expected compliance: Moderate compliance    Plan:     During this hospitalization, patient to be seen 5 x/week (5-7 x/week) to address the identified rehab impairments via gait training, therapeutic activities, therapeutic exercises, neuromuscular re-education, wheelchair management/training and progress toward the following goals:    GOALS:   Multidisciplinary Problems       Physical Therapy Goals          Problem: Physical Therapy    Goal Priority Disciplines Outcome Interventions   Physical Therapy Goal     PT, PT/OT Progressing    Description: Bed Mobility:  Roll left and right with setup/clean-up assist. - MET  Sit to supine transfer with supervision/touching assist. - MET   Supine to sit transfer with supervision/touching assist. - MET    Transfers:  Sit to stand transfer with supervision/touching assist using RW. - MET  Bed to chair " transfer with supervision/touching assist Stand Step  using RW. - MET  Car transfer with partial/moderate assist using RW. - MET   an object from the ground in standing position with partial/moderate assist using RW. - MET    Mobility:  Ambulate 75 feet with partial/moderate assist using RW. - MET  Ambulate 200 feet independently using RW  Ambulate 10 feet on uneven surface/ramp using RW with supervision/touching assist.  Ascend/descend a 4 inch curb independently using a RW  Ascend/descend 4 stairs with supervision/touching assist using flynn rails                           Plan of Care Expires:  11/15/24  PT Next Visit Date: 11/08/24  Plan of Care reviewed with: patient, spouse    Additional Information:         Time Tracking:     Therapy Time  PT Start Time: 1300  PT Stop Time: 1330  PT Total Time (min): 30 min   PT Individual: 30  Missed Time:    Time Missed due to:      Billable Minutes: Self care/Home management 30    11/06/2024   Detail Level: Generalized Quality 226: Preventive Care And Screening: Tobacco Use: Screening And Cessation Intervention: Patient screened for tobacco use and is an ex/non-smoker

## 2024-11-06 NOTE — PT/OT/SLP PROGRESS
"Occupational Therapy Inpatient Rehab Treatment    Name: Jerilyn Fernandez  MRN: [id number]    Assessment:  Jerilyn Fernandez is a 74 y.o. female admitted with a medical diagnosis of Closed fracture of right femur.  She presents with the following impairments/functional limitations:  pain, weakness, impaired self care skills, impaired balance, gait instability, impaired functional mobility, decreased lower extremity function, decreased coordination, decreased ROM, orthopedic precautions.    General Precautions: Standard, fall     Orthopedic Precautions:RLE weight bearing as tolerated     Braces: N/A    Rehab Prognosis: Good; patient would benefit from acute skilled OT services to address these deficits and reach maximum level of function.      History:     No past medical history on file.    Past Surgical History:   Procedure Laterality Date    RETROGRADE INTRAMEDULLARY RODDING OF DISTAL FEMUR Right 10/22/2024    Procedure: INSERTION, INTRAMEDULLARY BHARGAVI, FEMUR, DISTAL, RETROGRADE;  Surgeon: Mingo Sherman MD;  Location: [facility];  Service: Orthopedics;  Laterality: Right;  Supine, flat brigido, traction, triangles  Synthes RFNA  please make second case       Subjective     Orientation: Oriented x4    Chief Complaint: pain in R leg, fatigue    Patient/Family Comments/goals: "I am ready to go home"    Respiratory Status: Room air    Patients cultural, spiritual, Evangelical conflicts given the current situation: no     Objective:     Patient found up in chair with peripheral IV  upon OT entry to room.    Mobility   Patient completed:  Sit to Stand Transfer with independence with rolling walker  Stand to Sit Transfer with independence with rolling walker  Toilet Transfer Step Transfer technique with independence with  rolling walker    Functional Mobility  Patient took about 10 steps in room from chair to toilet with RW.    ADLs   Current Status   Toileting Hygiene 6   Toilet Transfer 6     Limiting Factors for ADLs: motor, sensory, " psychsocial, endurance, limited ROM, balance, weakness, visual, perceptual, coordination, cognition, safety awareness, and pain     Therapeutic Activities  Patient completed standing coloring activity while targeting standing tolerance by tolerating 6 minutes without break.    Therapeutic Exercise  Patient completed dowel exercises from wheelchair level with 3# dowel consisting of 3 x 10: chest press, biceps curls, and triceps pushdowns.    Patient left up in chair with all lines intact and with RT, Nereida .     Education provided: Roles and goals of OT, ADLs, transfer training, bed mobility, body mechanics, assistive device, wheelchair precautions, modified goals, sequencing, safety precautions, fall prevention, post-op precautions, equipment recommendations, and home safety    Multidisciplinary Problems       Occupational Therapy Goals          Problem: Occupational Therapy    Goal Priority Disciplines Outcome Interventions   Occupational Therapy Goal     OT, PT/OT Progressing    Description: ADLs:  MET - Pt to perform grooming tasks with SBA standing at BS sink with LRAD  MET - Pt to perform feeding tasks with independence   MET - Pt to perform UB dressing with setup assist   MET - Pt to perform LB dressing with mod A using AE as needed.  Updated goal: Pt to perform LB dressing with set up using AE as needed.  MET - Pt to perform putting on/off footwear task with max A using AE as needed  MET - Pt to perform toileting with SBA  MET - Pt to perform bathing with mod A using AE as needed.  Updated goal: Pt to perform bathing independently using AE as needed.    Functional Transfers:  MET - Pt to perform toilet transfers with SBA and BSC over toilet   MET - Pt to perform a tub transfer with CGA and TTB   Pt to perform a walk-in shower transfer with CGA     IADLs:  Pt to perform simple meal prep and light housekeeping with independence and LRAD                        Time Tracking     OT Received On: 11/06/24  Time In  0800     Time Out 0930  Total Time 90 min  Therapy Time: OT Individual: 90    Billable Minutes: Self Care/Home Management 15, Therapeutic Activity 30, and Therapeutic Exercise 45    11/06/2024

## 2024-11-06 NOTE — PT/OT/SLP PROGRESS
"Physical Therapy Inpatient Rehab Treatment    Patient Name:  Jerilyn Fernandez   MRN:  [id number]    Recommendations:     Discharge Recommendations:  Low Intensity Therapy   Discharge Equipment Recommendations:     Barriers to discharge: None    Assessment:     Jerilyn Fernandez is a 74 y.o. female admitted with a medical diagnosis of Closed fracture of right femur.  She presents with the following impairments/functional limitations:  weakness, impaired endurance, impaired functional mobility, gait instability, impaired balance, impaired cognition, decreased lower extremity function, pain, decreased ROM, edema, orthopedic precautions.    Rehab Diagnosis: Fall in shower, right femoral shaft fracture s/p IM rodding 10/22/2024. Pt. Has a history of HTN, HLD, anxiety, and migraines.    General Precautions: Standard, fall     Orthopedic Precautions:RLE weight bearing as tolerated (ROMAT)     Braces: N/A    Rehab Prognosis: Good; patient would benefit from acute skilled PT services to address these deficits and reach maximum level of function.      History:     No past medical history on file.    Past Surgical History:   Procedure Laterality Date    RETROGRADE INTRAMEDULLARY RODDING OF DISTAL FEMUR Right 10/22/2024    Procedure: INSERTION, INTRAMEDULLARY BHARGAVI, FEMUR, DISTAL, RETROGRADE;  Surgeon: Mingo Sherman MD;  Location: [facility];  Service: Orthopedics;  Laterality: Right;  Supine, flat brigido, traction, triangles  Synthes RFNA  please make second case       Subjective     Patient comments: "I feel more confident now"    Respiratory Status: Room air    Patients cultural, spiritual, Church conflicts given the current situation: no    Objective:     Patient found up in chair with peripheral IV  upon PT entry to room.    Pt is Alert, Cooperative, and Motivated.      Functional Mobility:      Current   Status  Discharge   Goal   Functional Area: Care Score:    Sit to Stand 6  Ind with RW Independent   Chair/Bed-to-Chair Transfer " 6  Ind with RW, stand step Independent   Walk 10 Feet 6  Ind with RW Independent   Walk 50 Feet with Two Turns 6  Ind with RW Independent   Walk 150 Feet 6  Ind with RW, 150ft x2 trials Supervision or touching assistance       Therapeutic Activities and Exercises:  Balance assessment:   - SPPB: 4/12 (improved from 3/12 on 11/31/24)  - Gait speed: 3meters in 10.72sec (Improved from 24.99sec on 11/31/24)  - 5x STS: 18.47 seconds (improved from 29.361 seconds on 11/31/24)  - Standing on blue foam, x30sec each: feet together, semi-tandem.       Activity Tolerance: Good    Patient left up in chair with call button in reach.    Education provided: roles and goals of PT/PTA, gait training, balance training, and body mechanics    Expected compliance: Moderate compliance    Plan:     During this hospitalization, patient to be seen 5 x/week (5-7 x/week) to address the identified rehab impairments via gait training, therapeutic activities, therapeutic exercises, neuromuscular re-education, wheelchair management/training and progress toward the following goals:    GOALS:   Multidisciplinary Problems       Physical Therapy Goals          Problem: Physical Therapy    Goal Priority Disciplines Outcome Interventions   Physical Therapy Goal     PT, PT/OT Progressing    Description: Bed Mobility:  Roll left and right with setup/clean-up assist. - MET  Sit to supine transfer with supervision/touching assist. - MET   Supine to sit transfer with supervision/touching assist. - MET    Transfers:  Sit to stand transfer with supervision/touching assist using RW. - MET  Bed to chair transfer with supervision/touching assist Stand Step  using RW. - MET  Car transfer with partial/moderate assist using RW. - MET   an object from the ground in standing position with partial/moderate assist using RW. - MET    Mobility:  Ambulate 75 feet with partial/moderate assist using RW. - MET  Ambulate 200 feet independently using RW  Ambulate 10 feet  on uneven surface/ramp using RW with supervision/touching assist.  Ascend/descend a 4 inch curb independently using a RW  Ascend/descend 4 stairs with supervision/touching assist using flynn rails                           Plan of Care Expires:  11/15/24  PT Next Visit Date: 11/08/24  Plan of Care reviewed with: patient    Additional Information:         Time Tracking:     Therapy Time  PT Start Time: 1115  PT Stop Time: 1200  PT Total Time (min): 45 min   PT Individual: 45  Missed Time:    Time Missed due to:      Billable Minutes: Therapeutic Activity 30 and Neuromuscular Re-education 15    11/06/2024

## 2024-11-06 NOTE — PROGRESS NOTES
11/06/24 1400   Rec Therapy Time Calculation   Date of Treatment 11/06/24   Rec Start Time 1400   Rec Stop Time 1430   Rec Total Time (min) 30 min   Time   Treatment time 2 units   Charges   $Therapeutic Exercise 2 units   Precautions   General Precautions fall   Orthopedic Precautions  RLE weight bearing as tolerated   Braces N/A   Pain/Comfort   Pain Rating 1 no pain   OTHER   Rehab identified problem list/impairments weakness;impaired functional mobility;gait instability;decreased lower extremity function   Values/Beliefs/Spiritual Care   Spiritual, Cultural Beliefs, Voodoo Practices, Values that Affect Care no   Overall Level of Functioning   Activity Tolerance Independent   Dynamic Sitting Balance/Reaching Independent   Dynamic Standing Balance/Reaching Independent   Right UE Coodination/Dexterity Independent   Left UE Coordination/Dexterity Independent   Problem Solving/Sequencing Skills Independent   Memory Recall Independent   R/L Neglect/Inattention Does not occur   Attention Span Independent   Social Interaction Independent   Recreational Therapy Short Term Goals   Short Term Goal 1 Progression Met   Short Term Goal 2 Progression Met   Recreational Therapy Long Term Goals   Long Term Goal 1 Progression Met   Long Term Goal 2 Progression Met   Plan   Patient to be seen Daily   Planned Duration Other (Comment)  (1 day)   Treatments Planned Energy conservation training   Treatment plan/goals estblished with Patient/Caregiver Yes

## 2024-11-06 NOTE — PT/OT/SLP PROGRESS
"Occupational Therapy Inpatient Rehab Treatment    Name: Jerilyn Fernandez  MRN: [id number]    Assessment:  Jerilyn Fernandez is a 74 y.o. female admitted with a medical diagnosis of Closed fracture of right femur.  She presents with the following impairments/functional limitations:  weakness, impaired endurance, impaired cognition, decreased safety awareness, pain, other (comment).    General Precautions: Standard, fall     Orthopedic Precautions:RLE weight bearing as tolerated     Braces: N/A    Rehab Prognosis: Good; patient would benefit from acute skilled OT services to address these deficits and reach maximum level of function.      History:     No past medical history on file.    Past Surgical History:   Procedure Laterality Date    RETROGRADE INTRAMEDULLARY RODDING OF DISTAL FEMUR Right 10/22/2024    Procedure: INSERTION, INTRAMEDULLARY BHARGAVI, FEMUR, DISTAL, RETROGRADE;  Surgeon: Mingo Sherman MD;  Location: [facility];  Service: Orthopedics;  Laterality: Right;  Supine, flat brigido, traction, triangles  Synthes RFNA  please make second case       Subjective     Orientation: Oriented x4    Chief Complaint: none    Patient/Family Comments/goals: "I am ready to go home tomorrow."    Respiratory Status: Room air    Patients cultural, spiritual, Mandaen conflicts given the current situation: no     Objective:     Patient found up in chair with peripheral IV  upon OT entry to room.  present for family training.    Mobility   Patient completed:  Sit to Stand Transfer with independence with rolling walker  Stand to Sit Transfer with independence with rolling walker  Shower Transfer Step Transfer technique with supervision with rolling walker    Functional Mobility  FM in hallway to ADL shower for shower transfer with RW independently (~100ft).    ADLs   Current Status   Lower Body Dressing 6 with no AD     Limiting Factors for ADLs: motor and psychsocial     Additional Treatments: During family training, educated "  on current status of independent with ADLs but needing SPV/set up for shower transfers. Pt and  performed shower transfer safely with no LOB. Encouraged pt to continue with activities of relaxation (example: therapeutic coloring) to decrease anxiety. Pt agreed.  states that he has placed suctioned cup grab bars in shower. OT explained the safety issues with these type of grab bars.  acknowledged concerns.  also stated that he has a shower chair in place and removed glass shower door. OT recommended shower curtain in its place. All other questions and concerns addressed.    Patient left up in chair with  RTNereida present.     Education provided: Roles and goals of OT, ADLs, transfer training, assistive device, sequencing, safety precautions, fall prevention, post-op precautions, equipment recommendations, and home safety    Multidisciplinary Problems       Occupational Therapy Goals          Problem: Occupational Therapy    Goal Priority Disciplines Outcome Interventions   Occupational Therapy Goal     OT, PT/OT Progressing    Description: ADLs:  MET - Pt to perform grooming tasks with SBA standing at BS sink with LRAD  MET - Pt to perform feeding tasks with independence   MET - Pt to perform UB dressing with setup assist   MET - Pt to perform LB dressing with mod A using AE as needed.  Updated goal: Pt to perform LB dressing with set up using AE as needed.  MET - Pt to perform putting on/off footwear task with max A using AE as needed  MET - Pt to perform toileting with SBA  MET - Pt to perform bathing with mod A using AE as needed.  Updated goal: Pt to perform bathing independently using AE as needed.    Functional Transfers:  MET - Pt to perform toilet transfers with SBA and BSC over toilet   MET - Pt to perform a tub transfer with CGA and TTB   Pt to perform a walk-in shower transfer with CGA     IADLs:  Pt to perform simple meal prep and light housekeeping with independence  and LRAD                        Time Tracking     OT Received On: 11/06/24  Time In 1330     Time Out 1400  Total Time 30 min  Therapy Time: OT Individual: 30  Missed Time:    Missed Time Reason:      Billable Minutes: Self Care/Home Management 30 11/06/2024

## 2024-11-06 NOTE — PLAN OF CARE
Problem: Skin Injury Risk Increased  Goal: Skin Health and Integrity  Outcome: Progressing     Problem: Fall Injury Risk  Goal: Absence of Fall and Fall-Related Injury  Outcome: Progressing     Problem: Wound  Goal: Absence of Infection Signs and Symptoms  Outcome: Progressing     Problem: Wound  Goal: Optimal Functional Ability  Outcome: Progressing     Problem: Wound  Goal: Skin Health and Integrity  Outcome: Progressing     Problem: Wound  Goal: Optimal Wound Healing  Outcome: Progressing     Problem: Rehabilitation (IRF) Plan of Care  Goal: Absence of New-Onset Illness or Injury  Outcome: Progressing

## 2024-11-07 VITALS
SYSTOLIC BLOOD PRESSURE: 121 MMHG | WEIGHT: 137.19 LBS | OXYGEN SATURATION: 98 % | BODY MASS INDEX: 25.25 KG/M2 | RESPIRATION RATE: 18 BRPM | DIASTOLIC BLOOD PRESSURE: 81 MMHG | HEART RATE: 92 BPM | HEIGHT: 62 IN | TEMPERATURE: 98 F

## 2024-11-07 PROCEDURE — 25000003 PHARM REV CODE 250: Performed by: NURSE PRACTITIONER

## 2024-11-07 PROCEDURE — 63600175 PHARM REV CODE 636 W HCPCS: Performed by: NURSE PRACTITIONER

## 2024-11-07 PROCEDURE — 97535 SELF CARE MNGMENT TRAINING: CPT

## 2024-11-07 RX ORDER — SODIUM CHLORIDE 1 G/1
1000 TABLET ORAL 2 TIMES DAILY
Qty: 60 TABLET | Refills: 0 | Status: SHIPPED | OUTPATIENT
Start: 2024-11-07 | End: 2024-12-07

## 2024-11-07 RX ORDER — DOCUSATE SODIUM 100 MG/1
100 CAPSULE, LIQUID FILLED ORAL 2 TIMES DAILY
Qty: 20 CAPSULE | Refills: 0 | Status: SHIPPED | OUTPATIENT
Start: 2024-11-07 | End: 2024-11-17

## 2024-11-07 RX ORDER — GABAPENTIN 300 MG/1
300 CAPSULE ORAL
Qty: 90 CAPSULE | Refills: 2 | Status: SHIPPED | OUTPATIENT
Start: 2024-11-07 | End: 2025-02-05

## 2024-11-07 RX ORDER — BUSPIRONE HYDROCHLORIDE 5 MG/1
5 TABLET ORAL
Qty: 90 TABLET | Refills: 2 | Status: SHIPPED | OUTPATIENT
Start: 2024-11-07 | End: 2025-02-05

## 2024-11-07 RX ORDER — HYDROCODONE BITARTRATE AND ACETAMINOPHEN 5; 325 MG/1; MG/1
1 TABLET ORAL EVERY 8 HOURS PRN
Qty: 12 TABLET | Refills: 0 | Status: SHIPPED | OUTPATIENT
Start: 2024-11-07 | End: 2024-11-11

## 2024-11-07 RX ORDER — ASPIRIN 81 MG/1
81 TABLET ORAL 2 TIMES DAILY
Qty: 60 TABLET | Refills: 0 | Status: SHIPPED | OUTPATIENT
Start: 2024-11-07 | End: 2024-12-07

## 2024-11-07 RX ORDER — METHOCARBAMOL 500 MG/1
500 TABLET, FILM COATED ORAL EVERY 8 HOURS
Start: 2024-11-07 | End: 2024-11-17

## 2024-11-07 RX ADMIN — BUSPIRONE HYDROCHLORIDE 5 MG: 5 TABLET ORAL at 06:11

## 2024-11-07 RX ADMIN — ENOXAPARIN SODIUM 40 MG: 40 INJECTION SUBCUTANEOUS at 08:11

## 2024-11-07 RX ADMIN — ACETAMINOPHEN 650 MG: 325 TABLET ORAL at 08:11

## 2024-11-07 RX ADMIN — SODIUM CHLORIDE 1000 MG: 1 TABLET ORAL at 08:11

## 2024-11-07 RX ADMIN — GABAPENTIN 300 MG: 300 CAPSULE ORAL at 06:11

## 2024-11-07 NOTE — PT/OT/SLP DISCHARGE
Physical Therapy Discharge Summary    Name: Jerilyn Fernandez  MRN: [id number]   Principal Problem: Closed fracture of right femur     Patient Discharged from acute Physical Therapy on 11/7/24.  Please refer to prior PT noted date on 11/5-11/7 for functional status.     Assessment:     Patient made good progress while at IRF. Pt was somewhat limited by her anxiety/fear, impaired memory, RLE swelling, and pain (all improved throughout her stay). Pt will benefit from continued OP therapy to return to PLOF of walking without an AD.     Objective:     GOALS:   Multidisciplinary Problems       Physical Therapy Goals       Not on file              Multidisciplinary Problems (Resolved)          Problem: Physical Therapy    Goal Priority Disciplines Outcome Interventions   Physical Therapy Goal   (Resolved)     PT, PT/OT Met    Description: Bed Mobility:  Roll left and right with setup/clean-up assist. - MET  Sit to supine transfer with supervision/touching assist. - MET   Supine to sit transfer with supervision/touching assist. - MET    Transfers:  Sit to stand transfer with supervision/touching assist using RW. - MET  Bed to chair transfer with supervision/touching assist Stand Step  using RW. - MET  Car transfer with partial/moderate assist using RW. - MET   an object from the ground in standing position with partial/moderate assist using RW. - MET    Mobility:  Ambulate 75 feet with partial/moderate assist using RW. - MET  Ambulate 200 feet independently using RW - MET  Ambulate 10 feet on uneven surface/ramp using RW with supervision/touching assist. - MET  Ascend/descend a 4 inch curb independently using a RW - MET  Ascend/descend 4 stairs with supervision/touching assist using flynn rails                            Care Scores:   Admission Assessment Current   Status  Discharge   Goal   Functional Area: Care Score:  Care Score:    Roll Left and Right 4 6 Independent   Sit to Lying 3 6 Independent   Lying to Sitting on  Side of Bed 3 6 Independent   Sit to Stand 3 6 Independent   Chair/Bed-to-Chair Transfer 3 6 Independent   Car Transfer 88 6 Independent   Walk 10 Feet 3 6 Independent   Walk 50 Feet with Two Turns 88 6 Independent   Walk 150 Feet 88 6 Supervision or touching assistance   Walk 10 Feet Uneven Surface 88 4 Supervision or touching assistance   1 Step (Curb) 9 6 Supervision or touching assistance   4 Steps 9 9 Partial/moderate assistance   12 Steps 9 9 Not applicable   Picking Up Object 88 6 Set-up/clean-up   Wheel 50 Feet with Two Turns 9 9 Not applicable   Wheel 150 Feet 9 9 Not applicable       Reasons for Discontinuation of IRF Therapy Services  Satisfactory goal achievement.      Plan:     Patient Discharged to: Outpatient Therapy Services.    11/7/2024

## 2024-11-07 NOTE — PT/OT/SLP PROGRESS
"Occupational Therapy Inpatient Rehab Treatment    Name: Jerilyn Fernandez  MRN: [id number]    Assessment:  Jerilyn Fernandez is a 74 y.o. female admitted with a medical diagnosis of Closed fracture of right femur.  She presents with the following impairments/functional limitations:  pain.    General Precautions: Standard, fall     Orthopedic Precautions:RLE weight bearing as tolerated     Braces: N/A    Rehab Prognosis: Good; patient would benefit from acute skilled OT services to address these deficits and reach maximum level of function.      History:     No past medical history on file.    Past Surgical History:   Procedure Laterality Date    RETROGRADE INTRAMEDULLARY RODDING OF DISTAL FEMUR Right 10/22/2024    Procedure: INSERTION, INTRAMEDULLARY BHARGAVI, FEMUR, DISTAL, RETROGRADE;  Surgeon: Mingo Sherman MD;  Location: [facility];  Service: Orthopedics;  Laterality: Right;  Supine, flat brigido, traction, triangles  Synthes RFNA  please make second case       Subjective     Orientation: Oriented x4    Chief Complaint: "I'm excited to go home today."    Patient/Family Comments/goals: "To see my puppy."    Respiratory Status: Room air    Patients cultural, spiritual, Gnosticism conflicts given the current situation: no       Objective:     Patient found up in chair with peripheral IV  upon OT entry to room.    Mobility   Patient completed:  Sit to Stand Transfer with independence with rolling walker  Stand to Sit Transfer with independence with rolling walker  Toilet Transfer Step Transfer technique with independence with  rolling walker and bedside commode  Tub Transfer Step Transfer technique with independence with rolling walker and TTB    Functional Mobility  FM in/out bathroom with RW and Indep    ADLs   Current Status   Eating 6   Oral Hygiene 6   Shower, Bathe Self 6   Upper Body Dressing 6   Lower Body Dressing 6   Toileting Hygiene 6   Toilet Transfer 6   Putting On, Taking Off Footwear 6     LifeStyle Change and " Education:   OT ensured all questions/concerns were addressed at conclusion, as pt is scheduled to discharge to home environment today. Pt felt comfortable and ready for discharge. Patient left up in chair with call button in reach and RN Alysha present.     Education provided: Roles and goals of OT, ADLs, transfer training, body mechanics, modified goals, sequencing, safety precautions, fall prevention, equipment recommendations, and home safety    Multidisciplinary Problems       Occupational Therapy Goals          Problem: Occupational Therapy    Goal Priority Disciplines Outcome Interventions   Occupational Therapy Goal     OT, PT/OT Progressing    Description: ADLs:  MET - Pt to perform grooming tasks with SBA standing at BS sink with LRAD  MET - Pt to perform feeding tasks with independence   MET - Pt to perform UB dressing with setup assist   MET - Pt to perform LB dressing with mod A using AE as needed.  Updated goal: Pt to perform LB dressing with set up using AE as needed.  MET - Pt to perform putting on/off footwear task with max A using AE as needed  MET - Pt to perform toileting with SBA  MET - Pt to perform bathing with mod A using AE as needed.  Updated goal: Pt to perform bathing independently using AE as needed.    Functional Transfers:  MET - Pt to perform toilet transfers with SBA and BSC over toilet   MET - Pt to perform a tub transfer with CGA and TTB   Pt to perform a walk-in shower transfer with CGA     IADLs:  Pt to perform simple meal prep and light housekeeping with independence and LRAD                        Time Tracking     OT Received On: 11/07/24  Time In 0830     Time Out 0900  Total Time 30 min  Therapy Time: OT Individual: 30  Missed Time:    Missed Time Reason:      Billable Minutes: Self Care/Home Management 30 11/07/2024

## 2024-11-07 NOTE — DISCHARGE SUMMARY
[facility] ([facility])  Rehab Discharge Summary    Patient Name: Jerilyn Fernandez  MRN: [id number]  Age: 74 y.o. Sex: female  : 1950  Hospital Length of Stay: 10 days   Date of Service: 2024      Discharge Information   Date of Admission: 10/28/2024  Date of Discharge: 2024  Admit Diagnosis: Right femur fracture 2/2 fall          Hyponatremia         Constipation          Anemia          SVT          HLD         HFpEF          VHD  Discharge Diagnosis: Right femur fracture 2/2 fall (stable)           Hyponatremia (resolved)           Constipation (stable)           Anemia (improving)           SVT (stable)           HLD (stable)           HFpEF (stable)           VHD (stable)           Anxiety (improved)           Insomnia (stable)    Internal Medicine (attending): Alexander Kitchen MD  Physiatry (consulting):  Isaiah Navas MD     OUTPATIENT PROVIDERS  PCP: DEX Kenny  Cardiology: Doni Barnhart MD  Orthopedic surgery: Mingo Sherman MD    Hospital Course   74-year-old WF presented to [facility] on 10/21 via EMS with complaints of right-sided hip pain after slipping and falling at home.  PMH significant for hypertension and hyperlipidemia.  Workup significant for right femur fracture.  On 10/22 tolerated IM stabilization without perioperative complications.  Postoperatively reported SVT requiring esmolol then transition to normal sinus rhythm.  Initiated metoprolol tartrate 12.5 mg b.i.d. per Cardiology.  Continued with orthostatic hypotension and trauma surgery held beta-blockers and Flomax.  Required 1 unit PRBC transfusion on 10/26. Tolerated transfer to [facility] inpatient rehab unit on 10/28 without incident.     During inpatient rehab course initially had some orthostatic hypotension that resolved.  BuSpar 5 mg t.i.d. initiated on 10/29 due to anxiety.  RLE NIVA negative for DVT.  Diuresed.  Sodium tablets decreased to 1000 mg b.i.d. on .  H&H continued to trend  "upward.  Sodium stable at 137 on .  Vital signs at goal with no recorded fevers.  Med reconciliation completed.  Discharge orders initiated.  Stable transfer home with home health.  PT reports overall setup to independent ambulating greater than 235 ft with rolling walker.  Self-limiting at times.  OT reports overall independent to supervision with all ADLs.  To follow up with scheduled appointments documented below.    Chief Complaint: Right femur fracture 2/2 fall s/p IM stabilization on 10/22     /81 (Patient Position: Sitting)   Pulse 92   Temp 98.4 °F (36.9 °C) (Oral)   Resp 18   Ht 5' 2" (1.575 m)   Wt 62.2 kg (137 lb 3.2 oz)   SpO2 98%   BMI 25.09 kg/m²      Physical Exam  Vitals reviewed.   Eyes:      Pupils: Pupils are equal, round, and reactive to light.   Cardiovascular:      Rate and Rhythm: Normal rate and regular rhythm.      Heart sounds: Normal heart sounds.   Pulmonary:      Effort: Pulmonary effort is normal.      Breath sounds: Normal breath sounds.   Abdominal:      General: Bowel sounds are normal.   Musculoskeletal:         General: Normal range of motion.      Right lower le+ Edema present.   Skin:     General: Skin is warm.      Comments: Right lower extremity incision dry and intact    Neurological:      General: No focal deficit present.      Mental Status: She is alert and oriented to person, place, and time.      Motor: Weakness present.   Psychiatric:         Mood and Affect: Mood normal.   *MD performed and documented physical examination        Labs  Admission on 10/28/2024   Component Date Value Ref Range Status    Sodium 10/29/2024 137  136 - 145 mmol/L Final    Potassium 10/29/2024 4.1  3.5 - 5.1 mmol/L Final    Chloride 10/29/2024 104  98 - 107 mmol/L Final    CO2 10/29/2024 23  23 - 31 mmol/L Final    Glucose 10/29/2024 96  82 - 115 mg/dL Final    Blood Urea Nitrogen 10/29/2024 12.3  9.8 - 20.1 mg/dL Final    Creatinine 10/29/2024 0.71  0.55 - 1.02 mg/dL " Final    Calcium 10/29/2024 8.5  8.4 - 10.2 mg/dL Final    Protein Total 10/29/2024 5.4 (L)  5.8 - 7.6 gm/dL Final    Albumin 10/29/2024 2.2 (L)  3.4 - 4.8 g/dL Final    Globulin 10/29/2024 3.2  2.4 - 3.5 gm/dL Final    Albumin/Globulin Ratio 10/29/2024 0.7 (L)  1.1 - 2.0 ratio Final    Bilirubin Total 10/29/2024 1.2  <=1.5 mg/dL Final    ALP 10/29/2024 267 (H)  40 - 150 unit/L Final    ALT 10/29/2024 40  0 - 55 unit/L Final    AST 10/29/2024 42 (H)  5 - 34 unit/L Final    eGFR 10/29/2024 >60  mL/min/1.73/m2 Final    Anion Gap 10/29/2024 10.0  mEq/L Final    BUN/Creatinine Ratio 10/29/2024 17   Final    Prealbumin 10/29/2024 11.0 (L)  14.0 - 37.0 mg/dL Final    Ferritin Level 10/29/2024 368.39 (H)  4.63 - 204.00 ng/mL Final    Iron Binding Capacity Unsaturated 10/29/2024 113  70 - 310 ug/dL Final    Iron Level 10/29/2024 31 (L)  50 - 170 ug/dL Final    Transferrin 10/29/2024 129 (L)  173 - 360 mg/dL Final    Iron Binding Capacity Total 10/29/2024 144 (L)  250 - 450 ug/dL Final    Iron Saturation 10/29/2024 22  20 - 50 % Final    WBC 10/29/2024 5.81  4.50 - 11.50 x10(3)/mcL Final    RBC 10/29/2024 2.89 (L)  4.20 - 5.40 x10(6)/mcL Final    Hgb 10/29/2024 8.9 (L)  12.0 - 16.0 g/dL Final    Hct 10/29/2024 26.5 (L)  37.0 - 47.0 % Final    MCV 10/29/2024 91.7  80.0 - 94.0 fL Final    MCH 10/29/2024 30.8  27.0 - 31.0 pg Final    MCHC 10/29/2024 33.6  33.0 - 36.0 g/dL Final    RDW 10/29/2024 14.2  11.5 - 17.0 % Final    Platelet 10/29/2024 223  130 - 400 x10(3)/mcL Final    MPV 10/29/2024 9.3  7.4 - 10.4 fL Final    Neut % 10/29/2024 64.6  % Final    Lymph % 10/29/2024 21.0  % Final    Mono % 10/29/2024 10.3  % Final    Eos % 10/29/2024 2.9  % Final    Basophil % 10/29/2024 0.7  % Final    Lymph # 10/29/2024 1.22  0.6 - 4.6 x10(3)/mcL Final    Neut # 10/29/2024 3.75  2.1 - 9.2 x10(3)/mcL Final    Mono # 10/29/2024 0.60  0.1 - 1.3 x10(3)/mcL Final    Eos # 10/29/2024 0.17  0 - 0.9 x10(3)/mcL Final    Baso # 10/29/2024 0.04   <=0.2 x10(3)/mcL Final    IG# 10/29/2024 0.03  0 - 0.04 x10(3)/mcL Final    IG% 10/29/2024 0.5  % Final    NRBC% 10/29/2024 0.0  % Final    Sodium 11/01/2024 139  136 - 145 mmol/L Final    Potassium 11/01/2024 3.7  3.5 - 5.1 mmol/L Final    Chloride 11/01/2024 106  98 - 107 mmol/L Final    CO2 11/01/2024 23  23 - 31 mmol/L Final    Glucose 11/01/2024 97  82 - 115 mg/dL Final    Blood Urea Nitrogen 11/01/2024 10.8  9.8 - 20.1 mg/dL Final    Creatinine 11/01/2024 0.74  0.55 - 1.02 mg/dL Final    Calcium 11/01/2024 9.1  8.4 - 10.2 mg/dL Final    Protein Total 11/01/2024 6.2  5.8 - 7.6 gm/dL Final    Albumin 11/01/2024 2.8 (L)  3.4 - 4.8 g/dL Final    Globulin 11/01/2024 3.4  2.4 - 3.5 gm/dL Final    Albumin/Globulin Ratio 11/01/2024 0.8 (L)  1.1 - 2.0 ratio Final    Bilirubin Total 11/01/2024 1.2  <=1.5 mg/dL Final    ALP 11/01/2024 200 (H)  40 - 150 unit/L Final    ALT 11/01/2024 21  0 - 55 unit/L Final    AST 11/01/2024 19  5 - 34 unit/L Final    eGFR 11/01/2024 >60  mL/min/1.73/m2 Final    Anion Gap 11/01/2024 10.0  mEq/L Final    BUN/Creatinine Ratio 11/01/2024 15   Final    Sodium 11/04/2024 137  136 - 145 mmol/L Final    Every Monday    Potassium 11/04/2024 4.0  3.5 - 5.1 mmol/L Final    Every Monday    Chloride 11/04/2024 106  98 - 107 mmol/L Final    Every Monday    CO2 11/04/2024 23  23 - 31 mmol/L Final    Every Monday    Glucose 11/04/2024 101  82 - 115 mg/dL Final    Every Monday    Blood Urea Nitrogen 11/04/2024 14.7  9.8 - 20.1 mg/dL Final    Every Monday    Creatinine 11/04/2024 0.78  0.55 - 1.02 mg/dL Final    Every Monday    Calcium 11/04/2024 9.0  8.4 - 10.2 mg/dL Final    Every Monday    Protein Total 11/04/2024 6.4  5.8 - 7.6 gm/dL Final    Every Monday    Albumin 11/04/2024 2.9 (L)  3.4 - 4.8 g/dL Final    Every Monday    Globulin 11/04/2024 3.5  2.4 - 3.5 gm/dL Final    Albumin/Globulin Ratio 11/04/2024 0.8 (L)  1.1 - 2.0 ratio Final    Bilirubin Total 11/04/2024 1.2  <=1.5 mg/dL Final    Every  Monday    ALP 11/04/2024 180 (H)  40 - 150 unit/L Final    Every Monday    ALT 11/04/2024 18  0 - 55 unit/L Final    Every Monday    AST 11/04/2024 18  5 - 34 unit/L Final    Every Monday    eGFR 11/04/2024 >60  mL/min/1.73/m2 Final    Anion Gap 11/04/2024 8.0  mEq/L Final    BUN/Creatinine Ratio 11/04/2024 19   Final    Prealbumin 11/04/2024 18.6  14.0 - 37.0 mg/dL Final    Magnesium Level 11/04/2024 1.70  1.60 - 2.60 mg/dL Final    Every Monday    Phosphorus Level 11/04/2024 3.5  2.3 - 4.7 mg/dL Final    Every Monday    WBC 11/04/2024 4.48 (L)  4.50 - 11.50 x10(3)/mcL Final    RBC 11/04/2024 3.19 (L)  4.20 - 5.40 x10(6)/mcL Final    Hgb 11/04/2024 9.7 (L)  12.0 - 16.0 g/dL Final    Hct 11/04/2024 30.5 (L)  37.0 - 47.0 % Final    MCV 11/04/2024 95.6 (H)  80.0 - 94.0 fL Final    MCH 11/04/2024 30.4  27.0 - 31.0 pg Final    MCHC 11/04/2024 31.8 (L)  33.0 - 36.0 g/dL Final    RDW 11/04/2024 15.8  11.5 - 17.0 % Final    Platelet 11/04/2024 439 (H)  130 - 400 x10(3)/mcL Final    MPV 11/04/2024 8.9  7.4 - 10.4 fL Final    Neut % 11/04/2024 59.2  % Final    Lymph % 11/04/2024 27.2  % Final    Mono % 11/04/2024 9.8  % Final    Eos % 11/04/2024 2.7  % Final    Basophil % 11/04/2024 0.4  % Final    Lymph # 11/04/2024 1.22  0.6 - 4.6 x10(3)/mcL Final    Neut # 11/04/2024 2.65  2.1 - 9.2 x10(3)/mcL Final    Mono # 11/04/2024 0.44  0.1 - 1.3 x10(3)/mcL Final    Eos # 11/04/2024 0.12  0 - 0.9 x10(3)/mcL Final    Baso # 11/04/2024 0.02  <=0.2 x10(3)/mcL Final    IG# 11/04/2024 0.03  0 - 0.04 x10(3)/mcL Final    IG% 11/04/2024 0.7  % Final    NRBC% 11/04/2024 0.0  % Final     Radiology  Right femur x-ray 10/22/2024:  Impression:  Interval insertion of IM carina.  Suprapatellar effusion.    Discharge Summary Plan   Discharge Status:  Improved    Location: Discharge home with home health    Medications: See discharge medicine reconciliation    Activity:  As tolerated    Diet:  ADA    Instructions:  Take all medications as  prescribed.      Attend appointments as scheduled.      Return to ED if symptoms worsen, or if t > 100.4.    Education:  SVT.  HFpEF.    Follow-up:  DEX Kenny within 1 week-office to call with follow-up appointment      Doni Barnhart MD on 10/31/2024 at 2:20 p.m.      Mingo Sherman MD on 11/18/2024 at 8:30 a.m.        Discussed plan of care, and patient communicated understanding. Agreed to comply with recommendations.    Dong Kumar NP conducted independent examination and assisted with medical documentation.     Discharge Time: 43 minutes

## 2024-11-07 NOTE — PT/OT/SLP DISCHARGE
Occupational Therapy Discharge Summary    Jerilyn Fernandez  MRN: [id number]   Principal Problem: Closed fracture of right femur      Patient Discharged from acute Occupational Therapy on 11/7/24.  Please refer to prior OT note dated 11/7/24 for functional status.    Assessment:      Patient has met all goals and is not appropriate for therapy.    Objective:     GOALS:   Multidisciplinary Problems       Occupational Therapy Goals          Problem: Occupational Therapy    Goal Priority Disciplines Outcome Interventions   Occupational Therapy Goal     OT, PT/OT Progressing    Description: ADLs:  MET - Pt to perform grooming tasks with SBA standing at BS sink with LRAD  MET - Pt to perform feeding tasks with independence   MET - Pt to perform UB dressing with setup assist   MET - Pt to perform LB dressing with mod A using AE as needed.  Updated goal: Pt to perform LB dressing with set up using AE as needed.  MET - Pt to perform putting on/off footwear task with max A using AE as needed  MET - Pt to perform toileting with SBA  MET - Pt to perform bathing with mod A using AE as needed.  Updated goal: Pt to perform bathing independently using AE as needed.    Functional Transfers:  MET - Pt to perform toilet transfers with SBA and BSC over toilet   MET - Pt to perform a tub transfer with CGA and TTB   Pt to perform a walk-in shower transfer with CGA     IADLs:  Pt to perform simple meal prep and light housekeeping with independence and LRAD                        Care Scores:   Admission Assessment Current Status Discharge  Goal   Functional Area: Care Score:  Care Score:    Eating 5 6 Independent   Oral Hygiene 88 6 Independent   Toileting Hygiene 3 6 Independent   Shower/Bathe Self 88 6 Set-up/clean-up   Upper Body Dressing 4 6 Independent   Lower Body Dressing 2 6 Independent   Putting On/Taking Off Footwear 1 6 Independent   Toilet Transfer 4 6 Independent     Reasons for Discontinuation of Therapy Services   Transfer to  alternate level of care. and Satisfactory goal achievement.      Plan:     Patient Discharged to: Outpatient Therapy Services      11/7/2024   No

## 2024-11-07 NOTE — PLAN OF CARE
Discharging today as planned.    RW will be delivered to room this a.m.  Shower chair and BSC were delivered to home by Max in coordination with .    Family training completed.    [facility] outpatient PT arranged and scheduled with  by [facility] staff.   A-T Advancement Flap Text: The defect edges were debeveled with a #15 scalpel blade.  Given the location of the defect, shape of the defect and the proximity to free margins an A-T advancement flap was deemed most appropriate.  Using a sterile surgical marker, an appropriate advancement flap was drawn incorporating the defect and placing the expected incisions within the relaxed skin tension lines where possible.    The area thus outlined was incised deep to adipose tissue with a #15 scalpel blade.  The skin margins were undermined to an appropriate distance in all directions utilizing iris scissors.

## 2024-11-13 ENCOUNTER — TELEPHONE (OUTPATIENT)
Facility: CLINIC | Age: 74
End: 2024-11-13
Payer: MEDICARE

## 2024-11-13 NOTE — TELEPHONE ENCOUNTER
Called and spoke with pt . He states she is aware of all her fu appts and have them already scheduled. They are waiting for her to get stronger because making an laith wit the cardiologist

## 2024-11-18 ENCOUNTER — OFFICE VISIT (OUTPATIENT)
Dept: ORTHOPEDICS | Facility: CLINIC | Age: 74
End: 2024-11-18
Payer: MEDICARE

## 2024-11-18 ENCOUNTER — HOSPITAL ENCOUNTER (OUTPATIENT)
Dept: RADIOLOGY | Facility: CLINIC | Age: 74
Discharge: HOME | End: 2024-11-18
Attending: ORTHOPAEDIC SURGERY
Payer: MEDICARE

## 2024-11-18 VITALS
SYSTOLIC BLOOD PRESSURE: 126 MMHG | DIASTOLIC BLOOD PRESSURE: 76 MMHG | HEART RATE: 88 BPM | WEIGHT: 136.69 LBS | BODY MASS INDEX: 25.15 KG/M2 | HEIGHT: 62 IN

## 2024-11-18 DIAGNOSIS — S72.91XD CLOSED FRACTURE OF RIGHT FEMUR WITH ROUTINE HEALING, UNSPECIFIED FRACTURE MORPHOLOGY, UNSPECIFIED PORTION OF FEMUR, SUBSEQUENT ENCOUNTER: ICD-10-CM

## 2024-11-18 DIAGNOSIS — S72.91XD CLOSED FRACTURE OF RIGHT FEMUR WITH ROUTINE HEALING, UNSPECIFIED FRACTURE MORPHOLOGY, UNSPECIFIED PORTION OF FEMUR, SUBSEQUENT ENCOUNTER: Primary | ICD-10-CM

## 2024-11-18 PROCEDURE — 99024 POSTOP FOLLOW-UP VISIT: CPT | Mod: POP,,,

## 2024-11-18 PROCEDURE — 73552 X-RAY EXAM OF FEMUR 2/>: CPT | Mod: RT,,, | Performed by: ORTHOPAEDIC SURGERY

## 2024-11-18 RX ORDER — ALBUTEROL SULFATE 90 UG/1
2 INHALANT RESPIRATORY (INHALATION) EVERY 6 HOURS PRN
COMMUNITY
Start: 2024-10-16

## 2024-11-18 RX ORDER — HYDROCODONE BITARTRATE AND ACETAMINOPHEN 5; 325 MG/1; MG/1
1 TABLET ORAL EVERY 6 HOURS PRN
COMMUNITY
End: 2024-11-18 | Stop reason: SDUPTHER

## 2024-11-18 RX ORDER — ALPRAZOLAM 1 MG/1
1 TABLET ORAL 2 TIMES DAILY PRN
COMMUNITY
Start: 2024-11-02

## 2024-11-18 RX ORDER — HYDROCODONE BITARTRATE AND ACETAMINOPHEN 5; 325 MG/1; MG/1
1 TABLET ORAL EVERY 6 HOURS PRN
Qty: 28 TABLET | Refills: 0 | Status: SHIPPED | OUTPATIENT
Start: 2024-11-18 | End: 2024-11-25

## 2024-11-18 RX ORDER — ATORVASTATIN CALCIUM 20 MG/1
20 TABLET, FILM COATED ORAL NIGHTLY
COMMUNITY
Start: 2024-11-02

## 2024-11-18 NOTE — PROGRESS NOTES
"[facility]      Name: Jerilyn Fernandez  : 1950  MRN: [id number]  Date: 2024    Chief Complaint   Patient presents with    Right Femur - Post-op Evaluation     4 week f/u from IMN right femur fx. Ambulates with walker. Reports intermittent aching and soreness. Not currently working with physical therapy.        Subjective:      Chief Complaint   Patient presents with    Right Femur - Post-op Evaluation     4 week f/u from IMN right femur fx. Ambulates with walker. Reports intermittent aching and soreness. Not currently working with physical therapy.         HPI  Jerilyn Fernandez is a 74 y.o. female presents to clinic for 4 week follow up status post intramedullary nail of right femur for spiral fracture.  Ambulates with a walker.  He has intermittent soreness mainly along the quadriceps region.  Working with physical therapy tomorrow.   present with her today.  States her staples were removed a couple of weeks ago.      ROS:  Constitutional: Denies fever chills  MSK: Pain evident at site of injury located in HPI,   Integ: No signs of abrasions or lacerations  Neuro: No numbness or tingling  Lymphatic: No swelling outside the area of injury     Current Outpatient Medications   Medication Instructions    albuterol (PROVENTIL/VENTOLIN HFA) 90 mcg/actuation inhaler 2 puffs, Every 6 hours PRN    ALPRAZolam (XANAX) 1 mg, 2 times daily PRN    aspirin (ECOTRIN) 81 mg, Oral, 2 times daily    atorvastatin (LIPITOR) 20 mg, Nightly    busPIRone (BUSPAR) 5 mg, Oral, 3 times daily before meals    gabapentin (NEURONTIN) 300 mg, Oral, 3 times daily before meals    HYDROcodone-acetaminophen (NORCO) 5-325 mg per tablet 1 tablet, Oral, Every 6 hours PRN    sodium chloride 1,000 mg, Oral, 2 times daily        Objective:     Visit Vitals  /76   Pulse 88   Ht 5' 2" (1.575 m)   Wt 62 kg (136 lb 11 oz)   BMI 25.00 kg/m²     Physical Exam    General the patient is alert and oriented x3 no acute distress " "nontoxic-appearing appropriate affect.    Constitutional: Vital signs are examined and stable.  Resp: No signs of labored breathing                   Lower extremity:           -Skin:  warm and dry           -MSK: Hip and Knee F/E, EHL/FHL, Gastroc/Tib anterior motor intact.  No painful or prominent hardware.  Tightness with hip flexion and knee flexion due to quadriceps tightness; stretch sensation noted by patient.            -Neuro:  Sensation intact to light touch L3-S1 dermatomes           -Lymphatic:  Nonpitting edema at surgical site           -CV:  Posterior tibialis pulse palpable.  Compartments soft and compressible        Body mass index is 25 kg/m².  Ideal body weight: 50.1 kg (110 lb 7.2 oz)  Adjusted ideal body weight: 54.9 kg (120 lb 15.1 oz)  Hgb   Date Value Ref Range Status   11/04/2024 9.7 (L) 12.0 - 16.0 g/dL Final   10/29/2024 8.9 (L) 12.0 - 16.0 g/dL Final     Hct   Date Value Ref Range Status   11/04/2024 30.5 (L) 37.0 - 47.0 % Final   10/29/2024 26.5 (L) 37.0 - 47.0 % Final     No results found for: "[id number]"  WBC   Date Value Ref Range Status   11/04/2024 4.48 (L) 4.50 - 11.50 x10(3)/mcL Final   10/29/2024 5.81 4.50 - 11.50 x10(3)/mcL Final       Radiology:   XR R femur:  hardware intact.  Evidence of small callus formation noted on AP.     Assessment:       ICD-10-CM ICD-9-CM   1. Closed fracture of right femur with routine healing, unspecified fracture morphology, unspecified portion of femur, subsequent encounter  S72.91XD V54.15       Plan:     1. Closed fracture of right femur with routine healing, unspecified fracture morphology, unspecified portion of femur, subsequent encounter  -     X-Ray Femur 2 View Right; Future; Expected date: 11/18/2024  -     HYDROcodone-acetaminophen (NORCO) 5-325 mg per tablet; Take 1 tablet by mouth every 6 (six) hours as needed for Pain.  Dispense: 28 tablet; Refill: 0      Patient does have limitation with flexion of hip and knee due to quadriceps " soreness and stretching.  She is beginning physical therapy this week.  Ambulating with a walker.  Continue weight-bearing as tolerated and range of motion as tolerated.  Follow up in 6 weeks for repeat evaluation and x-rays.  Patient and her  verbalized understanding and agree with treatment plan.  Pt to call clinic with any questions/concerns.      The above findings, diagnostics, and treatment plan were discussed with Dr. Sherman who is in agreement with the plan of care except as stated in additional documentation.     Ashley Gunther, PA-C [facility]    Future Appointments   Date Time Provider Department Center   12/30/2024  8:45 AM iMngo Sherman MD [facility] [place]       This note was created with the assistance of voice recognition software or phone dictation. There may be transcription errors as a result of using this technology however minimal. Effort has been made to assure accuracy of transcription but any obvious errors or omissions should be clarified with the author of the document.

## 2024-12-05 DIAGNOSIS — S72.91XD CLOSED FRACTURE OF RIGHT FEMUR WITH ROUTINE HEALING, UNSPECIFIED FRACTURE MORPHOLOGY, UNSPECIFIED PORTION OF FEMUR, SUBSEQUENT ENCOUNTER: ICD-10-CM

## 2024-12-06 RX ORDER — HYDROCODONE BITARTRATE AND ACETAMINOPHEN 5; 325 MG/1; MG/1
1 TABLET ORAL EVERY 8 HOURS PRN
Qty: 21 TABLET | Refills: 0 | Status: SHIPPED | OUTPATIENT
Start: 2024-12-06 | End: 2024-12-13

## 2024-12-23 DIAGNOSIS — S72.91XD CLOSED FRACTURE OF RIGHT FEMUR WITH ROUTINE HEALING, UNSPECIFIED FRACTURE MORPHOLOGY, UNSPECIFIED PORTION OF FEMUR, SUBSEQUENT ENCOUNTER: ICD-10-CM

## 2024-12-23 RX ORDER — HYDROCODONE BITARTRATE AND ACETAMINOPHEN 5; 325 MG/1; MG/1
1 TABLET ORAL EVERY 12 HOURS PRN
Qty: 14 TABLET | Refills: 0 | Status: SHIPPED | OUTPATIENT
Start: 2024-12-23 | End: 2024-12-30

## 2024-12-30 ENCOUNTER — OFFICE VISIT (OUTPATIENT)
Dept: ORTHOPEDICS | Facility: CLINIC | Age: 74
End: 2024-12-30
Payer: MEDICARE

## 2024-12-30 ENCOUNTER — HOSPITAL ENCOUNTER (OUTPATIENT)
Dept: RADIOLOGY | Facility: CLINIC | Age: 74
Discharge: HOME | End: 2024-12-30
Attending: ORTHOPAEDIC SURGERY
Payer: MEDICARE

## 2024-12-30 VITALS
HEIGHT: 62 IN | HEART RATE: 83 BPM | DIASTOLIC BLOOD PRESSURE: 77 MMHG | BODY MASS INDEX: 25.15 KG/M2 | WEIGHT: 136.69 LBS | SYSTOLIC BLOOD PRESSURE: 115 MMHG

## 2024-12-30 DIAGNOSIS — S72.91XD CLOSED FRACTURE OF RIGHT FEMUR WITH ROUTINE HEALING, UNSPECIFIED FRACTURE MORPHOLOGY, UNSPECIFIED PORTION OF FEMUR, SUBSEQUENT ENCOUNTER: Primary | ICD-10-CM

## 2024-12-30 DIAGNOSIS — S72.91XD CLOSED FRACTURE OF RIGHT FEMUR WITH ROUTINE HEALING, UNSPECIFIED FRACTURE MORPHOLOGY, UNSPECIFIED PORTION OF FEMUR, SUBSEQUENT ENCOUNTER: ICD-10-CM

## 2024-12-30 PROCEDURE — 73552 X-RAY EXAM OF FEMUR 2/>: CPT | Mod: RT,,, | Performed by: ORTHOPAEDIC SURGERY

## 2024-12-30 PROCEDURE — 99024 POSTOP FOLLOW-UP VISIT: CPT | Mod: POP,,, | Performed by: ORTHOPAEDIC SURGERY

## 2024-12-30 NOTE — PROGRESS NOTES
Subjective:       Patient ID: Jerilyn Fernandez is a 74 y.o. female.    Chief Complaint   Patient presents with    Right Femur - Follow-up     10 wks,  IMN Rt Femur SX 10/22/24-gl 1/20/25, wbat, ambulates with walker, denies pain,         Patient is here today for follow-up evaluation 10 weeks status post intramedullary nailing right femur shaft fracture.  She has been doing well.  She has been working with physical therapy.  She ambulates with a walker.  She has weaned herself down from her gabapentin and is ready to stop its use completely.    Follow-up  Pertinent negatives include no abdominal pain, chest pain, chills, congestion, coughing, fever, nausea, neck pain, numbness or vomiting.       Review of Systems   Constitutional: Negative for chills, fever and malaise/fatigue.   HENT:  Negative for congestion and hearing loss.    Eyes:  Negative for visual disturbance.   Cardiovascular:  Negative for chest pain and syncope.   Respiratory:  Negative for cough and shortness of breath.    Hematologic/Lymphatic: Does not bruise/bleed easily.   Skin:  Negative for color change and suspicious lesions.   Musculoskeletal:  Negative for falls and neck pain.   Gastrointestinal:  Negative for abdominal pain, nausea and vomiting.   Genitourinary:  Negative for dysuria and hematuria.   Neurological:  Negative for numbness and sensory change.   Psychiatric/Behavioral:  Negative for altered mental status. The patient is not nervous/anxious.         Current Outpatient Medications on File Prior to Visit   Medication Sig Dispense Refill    albuterol (PROVENTIL/VENTOLIN HFA) 90 mcg/actuation inhaler 2 puffs every 6 (six) hours as needed.      ALPRAZolam (XANAX) 1 MG tablet Take 1 mg by mouth 2 (two) times daily as needed.      atorvastatin (LIPITOR) 20 MG tablet Take 20 mg by mouth every evening.      busPIRone (BUSPAR) 5 MG Tab Take 1 tablet (5 mg total) by mouth 3 (three) times daily before meals. 90 tablet 2    gabapentin (NEURONTIN)  "300 MG capsule Take 1 capsule (300 mg total) by mouth 3 (three) times daily before meals. 90 capsule 2    HYDROcodone-acetaminophen (NORCO) 5-325 mg per tablet Take 1 tablet by mouth every 12 (twelve) hours as needed for Pain. 14 tablet 0    aspirin (ECOTRIN) 81 MG EC tablet Take 1 tablet (81 mg total) by mouth 2 (two) times a day. 60 tablet 0     No current facility-administered medications on file prior to visit.          Objective:      /77   Pulse 83   Ht 5' 2" (1.575 m)   Wt 62 kg (136 lb 11 oz)   BMI 25.00 kg/m²   Physical Exam  Musculoskeletal:      Comments: Right lower extremity: Surgical incisions are all well healed.  Tolerates range motion of the hip, stiffness with flexion past 110°.  No mechanical block to motion.  Able to obtain full extension.  No painful or prominent hardware.  No calf swelling or tenderness, no signs of DVT.  Able to perform dorsiflexion plantar flexion of the ankle and digits distally.        Body mass index is 25 kg/m².    Radiology:   Right femur two views:  Hardware intact.  Alignment maintained.  Evidence of new callus formation compared to previous films.      Assessment:         1. Closed fracture of right femur with routine healing, unspecified fracture morphology, unspecified portion of femur, subsequent encounter  X-Ray Femur 2 View Right              Plan:       She is doing very well today and I am happy with her progress.  She can continue full weight-bearing right lower extremity, she is to continue to work on strengthening and range motion exercises.  She can wean away from the walker as tolerated.  She can continue to wean and discontinue the use of her gabapentin.  I will see her back in 2 months for repeat x-rays of the right femur.  She and her  understand and agree with all that we have discussed and all questions and concerns were addressed.      This note/OR report was created with the assistance of  voice recognition software or phone  " dictation.  There may be transcription errors as a result of using this technology however minimal. Effort has been made to assure accuracy of transcription but any obvious errors or omissions should be clarified with the author of the document.       Mingo Sherman MD  Orthopedic Trauma  [facility]      Follow up in about 2 months (around 2/28/2025).    Closed fracture of right femur with routine healing, unspecified fracture morphology, unspecified portion of femur, subsequent encounter  -     X-Ray Femur 2 View Right; Future; Expected date: 12/30/2024              Orders Placed This Encounter   Procedures    X-Ray Femur 2 View Right     Standing Status:   Future     Number of Occurrences:   1     Standing Expiration Date:   12/27/2025     Order Specific Question:   May the Radiologist modify the order per protocol to meet the clinical needs of the patient?     Answer:   Yes     Order Specific Question:   Release to patient     Answer:   Immediate       Future Appointments   Date Time Provider Department Center   2/24/2025  8:15 AM Mingo Sherman MD [facility]

## 2025-01-06 DIAGNOSIS — S72.91XD CLOSED FRACTURE OF RIGHT FEMUR WITH ROUTINE HEALING, UNSPECIFIED FRACTURE MORPHOLOGY, UNSPECIFIED PORTION OF FEMUR, SUBSEQUENT ENCOUNTER: ICD-10-CM

## 2025-01-06 RX ORDER — HYDROCODONE BITARTRATE AND ACETAMINOPHEN 5; 325 MG/1; MG/1
1 TABLET ORAL
Qty: 7 TABLET | Refills: 0 | Status: SHIPPED | OUTPATIENT
Start: 2025-01-06 | End: 2025-01-13

## 2025-02-24 ENCOUNTER — OFFICE VISIT (OUTPATIENT)
Dept: ORTHOPEDICS | Facility: CLINIC | Age: 75
End: 2025-02-24
Payer: MEDICARE

## 2025-02-24 ENCOUNTER — HOSPITAL ENCOUNTER (OUTPATIENT)
Dept: RADIOLOGY | Facility: CLINIC | Age: 75
Discharge: HOME | End: 2025-02-24
Attending: ORTHOPAEDIC SURGERY
Payer: MEDICARE

## 2025-02-24 VITALS
BODY MASS INDEX: 25.15 KG/M2 | SYSTOLIC BLOOD PRESSURE: 113 MMHG | HEART RATE: 78 BPM | DIASTOLIC BLOOD PRESSURE: 78 MMHG | HEIGHT: 62 IN | WEIGHT: 136.69 LBS

## 2025-02-24 DIAGNOSIS — S72.91XD CLOSED FRACTURE OF RIGHT FEMUR WITH ROUTINE HEALING, UNSPECIFIED FRACTURE MORPHOLOGY, UNSPECIFIED PORTION OF FEMUR, SUBSEQUENT ENCOUNTER: Primary | ICD-10-CM

## 2025-02-24 DIAGNOSIS — S72.91XD CLOSED FRACTURE OF RIGHT FEMUR WITH ROUTINE HEALING, UNSPECIFIED FRACTURE MORPHOLOGY, UNSPECIFIED PORTION OF FEMUR, SUBSEQUENT ENCOUNTER: ICD-10-CM

## 2025-02-24 PROCEDURE — 73552 X-RAY EXAM OF FEMUR 2/>: CPT | Mod: RT,,, | Performed by: ORTHOPAEDIC SURGERY

## 2025-02-24 NOTE — PROGRESS NOTES
Subjective:       Patient ID: Jerilyn Fernandez is a 74 y.o. female.    Chief Complaint   Patient presents with    Right Femur - Follow-up     4 month f/u from IMN right femur fx. Ambulates with cane. Reports improvement in pain. Completed physical therapy.        Patient is here today for follow-up evaluation 4 months status post intramedullary nailing right femur shaft fracture.  She has been doing well.  Has finished physical therapy.  Ambulates with a cane.  Reports she does few steps without the cane sometimes.  Ambulated without a cane prior to the accident.  Presents here with her  today.  No other complaints at this time.    Follow-up  Pertinent negatives include no abdominal pain, chest pain, chills, congestion, coughing, fever, nausea, neck pain, numbness or vomiting.       Review of Systems   Constitutional: Negative for chills, fever and malaise/fatigue.   HENT:  Negative for congestion and hearing loss.    Eyes:  Negative for visual disturbance.   Cardiovascular:  Negative for chest pain and syncope.   Respiratory:  Negative for cough and shortness of breath.    Hematologic/Lymphatic: Does not bruise/bleed easily.   Skin:  Negative for color change and suspicious lesions.   Musculoskeletal:  Negative for falls and neck pain.   Gastrointestinal:  Negative for abdominal pain, nausea and vomiting.   Genitourinary:  Negative for dysuria and hematuria.   Neurological:  Negative for numbness and sensory change.   Psychiatric/Behavioral:  Negative for altered mental status. The patient is not nervous/anxious.         Current Outpatient Medications on File Prior to Visit   Medication Sig Dispense Refill    albuterol (PROVENTIL/VENTOLIN HFA) 90 mcg/actuation inhaler 2 puffs every 6 (six) hours as needed.      ALPRAZolam (XANAX) 1 MG tablet Take 1 mg by mouth 2 (two) times daily as needed.      atorvastatin (LIPITOR) 20 MG tablet Take 20 mg by mouth every evening.      aspirin (ECOTRIN) 81 MG EC tablet Take 1  "tablet (81 mg total) by mouth 2 (two) times a day. 60 tablet 0    busPIRone (BUSPAR) 5 MG Tab Take 1 tablet (5 mg total) by mouth 3 (three) times daily before meals. 90 tablet 2    gabapentin (NEURONTIN) 300 MG capsule Take 1 capsule (300 mg total) by mouth 3 (three) times daily before meals. 90 capsule 2     No current facility-administered medications on file prior to visit.          Objective:      /78   Pulse 78   Ht 5' 2" (1.575 m)   Wt 62 kg (136 lb 11 oz)   BMI 25.00 kg/m²   Physical Exam  Constitutional:       General: She is not in acute distress.     Appearance: Normal appearance.   HENT:      Head: Normocephalic.      Nose: Nose normal.   Eyes:      Extraocular Movements: Extraocular movements intact.   Cardiovascular:      Rate and Rhythm: Normal rate.   Pulmonary:      Effort: Pulmonary effort is normal.   Musculoskeletal:      Comments: Right lower extremity: Surgical incisions are all well healed.  Tolerates range motion of the hip, stiffness with flexion past 110°.  No mechanical block to motion.  Able to obtain full extension.  No painful or prominent hardware.  No calf swelling or tenderness, no signs of DVT.  Able to perform dorsiflexion plantar flexion of the ankle and digits distally.   Neurological:      Mental Status: She is alert and oriented to person, place, and time.   Psychiatric:         Mood and Affect: Mood normal.         Behavior: Behavior normal.        Body mass index is 25 kg/m².    Radiology:   Right femur two views:  Hardware intact.  Alignment maintained.  Fracture consolidated.       Assessment:         1. Closed fracture of right femur with routine healing, unspecified fracture morphology, unspecified portion of femur, subsequent encounter  X-Ray Femur 2 View Right              Plan:       She is doing very well today and I am happy with her progress.  She can continue full weight-bearing right lower extremity, she is to continue to work on strengthening and range " motion exercises at home.  She can wean away from the cane as tolerated.  I will see her back in 2 months for repeat x-rays of the right femur.  This will potentially be her last visit.  She and her  understand and agree with all that we have discussed and all questions and concerns were addressed.      This note/OR report was created with the assistance of  voice recognition software or phone  dictation.  There may be transcription errors as a result of using this technology however minimal. Effort has been made to assure accuracy of transcription but any obvious errors or omissions should be clarified with the author of the document.     The above findings, diagnostics, and treatment plan were discussed with Dr. Sherman who is in agreement with the plan of care except as stated in additional documentation.        Alysha Sharma PA-C  Orthopedic Trauma  [facility]      No follow-ups on file.    Closed fracture of right femur with routine healing, unspecified fracture morphology, unspecified portion of femur, subsequent encounter  -     X-Ray Femur 2 View Right; Future; Expected date: 02/24/2025              Orders Placed This Encounter   Procedures    X-Ray Femur 2 View Right     Standing Status:   Future     Number of Occurrences:   1     Expected Date:   2/24/2025     Expiration Date:   2/21/2026     May the Radiologist modify the order per protocol to meet the clinical needs of the patient?:   Yes     Release to patient:   Immediate       No future appointments.

## 2025-03-05 NOTE — ACP (ADVANCE CARE PLANNING)
Advance Directives:   Living Will: No        Oral Declaration: Yes  [place]: No    Do Not Resuscitate Status: No    Medical Power of : No        Oral Declaration: Yes    Decision Making:  Patient answered questions  Goals of Care: The patient endorses that what is most important right now is to focus on increasing in mobility with therapies.    Accordingly, we have decided that the best plan to meet the patient's goals includes continuing with treatment.    Witnesses present:  Patient and provider  ACP 2/2 new patient   ACP discussed voluntarily    Total time spent: 17 minutes   Spoke w/office staff at [facility] and they will send a message to provider and fax form to [place] office.

## 2025-04-23 ENCOUNTER — HOSPITAL ENCOUNTER (OUTPATIENT)
Dept: RADIOLOGY | Facility: CLINIC | Age: 75
Discharge: HOME | End: 2025-04-23
Attending: ORTHOPAEDIC SURGERY
Payer: MEDICARE

## 2025-04-23 ENCOUNTER — OFFICE VISIT (OUTPATIENT)
Dept: ORTHOPEDICS | Facility: CLINIC | Age: 75
End: 2025-04-23
Payer: MEDICARE

## 2025-04-23 VITALS
BODY MASS INDEX: 25.15 KG/M2 | WEIGHT: 136.69 LBS | DIASTOLIC BLOOD PRESSURE: 68 MMHG | SYSTOLIC BLOOD PRESSURE: 128 MMHG | HEIGHT: 62 IN | HEART RATE: 72 BPM

## 2025-04-23 DIAGNOSIS — S72.91XD CLOSED FRACTURE OF RIGHT FEMUR WITH ROUTINE HEALING, UNSPECIFIED FRACTURE MORPHOLOGY, UNSPECIFIED PORTION OF FEMUR, SUBSEQUENT ENCOUNTER: ICD-10-CM

## 2025-04-23 DIAGNOSIS — S72.91XD CLOSED FRACTURE OF RIGHT FEMUR WITH ROUTINE HEALING, UNSPECIFIED FRACTURE MORPHOLOGY, UNSPECIFIED PORTION OF FEMUR, SUBSEQUENT ENCOUNTER: Primary | ICD-10-CM

## 2025-04-23 PROCEDURE — 99213 OFFICE O/P EST LOW 20 MIN: CPT | Mod: ,,, | Performed by: ORTHOPAEDIC SURGERY

## 2025-04-23 PROCEDURE — 73552 X-RAY EXAM OF FEMUR 2/>: CPT | Mod: RT,,, | Performed by: ORTHOPAEDIC SURGERY

## 2025-04-23 NOTE — PROGRESS NOTES
"Subjective:       Patient ID: Jerilyn Fernandez is a 74 y.o. female.    Chief Complaint   Patient presents with    Right Femur - Follow-up     6 months f/u IMN Rt Femur SX 10/22/24, wbat, ambulates without assistance, reports still having some soreness at times, states will have some clicking as well,         Patient is here today for follow-up evaluation 6 months status post intramedullary nailing of right femur.  She has been doing well.  She ambulates without any assistive devices and has no limp.  She reports some crepitus in the knee with range motion though it is not painful has not cause any problems.    Follow-up  Pertinent negatives include no abdominal pain, chest pain, chills, congestion, coughing, fever, nausea, neck pain, numbness or vomiting.       Review of Systems   Constitutional: Negative for chills, fever and malaise/fatigue.   HENT:  Negative for congestion and hearing loss.    Eyes:  Negative for visual disturbance.   Cardiovascular:  Negative for chest pain and syncope.   Respiratory:  Negative for cough and shortness of breath.    Hematologic/Lymphatic: Does not bruise/bleed easily.   Skin:  Negative for color change and suspicious lesions.   Musculoskeletal:  Negative for falls and neck pain.   Gastrointestinal:  Negative for abdominal pain, nausea and vomiting.   Genitourinary:  Negative for dysuria and hematuria.   Neurological:  Negative for numbness and sensory change.   Psychiatric/Behavioral:  Negative for altered mental status. The patient is not nervous/anxious.         Medications Ordered Prior to Encounter[1]       Objective:      /68   Pulse 72   Ht 5' 2" (1.575 m)   Wt 62 kg (136 lb 11 oz)   BMI 25.00 kg/m²   Physical Exam  Constitutional:       General: She is not in acute distress.     Appearance: Normal appearance. She is not ill-appearing.   HENT:      Head: Normocephalic and atraumatic.      Nose: No congestion.   Eyes:      Extraocular Movements: Extraocular movements " intact.   Cardiovascular:      Rate and Rhythm: Normal rate and regular rhythm.      Pulses: Normal pulses.   Pulmonary:      Effort: Pulmonary effort is normal.      Breath sounds: Normal breath sounds.   Abdominal:      General: There is no distension.      Palpations: Abdomen is soft.      Tenderness: There is no abdominal tenderness.   Musculoskeletal:      Comments: Right lower extremity:  Surgical incisions are all well healed.  She has some patellofemoral crepitus with flexion past 90°.  No pain associated with range motion.  She has full active range motion of the right knee compared to the left.  No tenderness in the thigh.  She is ambulating well.  No calf swelling or tenderness, no signs of DVT.   Skin:     General: Skin is warm and dry.   Neurological:      Mental Status: She is alert and oriented to person, place, and time. Mental status is at baseline.   Psychiatric:         Mood and Affect: Mood normal.         Behavior: Behavior normal.         Thought Content: Thought content normal.         Judgment: Judgment normal.        Body mass index is 25 kg/m².    Radiology:   Right femur two views:  Hardware intact.  Alignment maintained.  Fracture is completely consolidated.      Assessment:         1. Closed fracture of right femur with routine healing, unspecified fracture morphology, unspecified portion of femur, subsequent encounter  X-Ray Femur 2 View Right              Plan:       She has some mild crepitus with range motion of the knee which is likely related to scar tissue from her insertion site for her retrograde femoral nail.  It is not painful in his not cause any discomfort.  Continue to work on strengthening of her quad and range motion exercises and utilize anti-inflammatory medications for pain control if needed.  Her fracture is completely healed and she is continue full active use of the right lower extremity without any restrictions.  She can follow up with me on an as-needed basis  should any new issues arise for her in the future.  All questions and concerns were addressed and they understand and agree with this plan of care today.      This note/OR report was created with the assistance of  voice recognition software or phone  dictation.  There may be transcription errors as a result of using this technology however minimal. Effort has been made to assure accuracy of transcription but any obvious errors or omissions should be clarified with the author of the document.       Mingo Sherman MD  Orthopedic Trauma  [facility]      No follow-ups on file.    Closed fracture of right femur with routine healing, unspecified fracture morphology, unspecified portion of femur, subsequent encounter  -     X-Ray Femur 2 View Right; Future; Expected date: 04/23/2025              Orders Placed This Encounter   Procedures    X-Ray Femur 2 View Right     Standing Status:   Future     Number of Occurrences:   1     Expected Date:   4/23/2025     Expiration Date:   4/22/2026     May the Radiologist modify the order per protocol to meet the clinical needs of the patient?:   Yes     Release to patient:   Immediate       No future appointments.                   [1]   Current Outpatient Medications on File Prior to Visit   Medication Sig Dispense Refill    albuterol (PROVENTIL/VENTOLIN HFA) 90 mcg/actuation inhaler 2 puffs every 6 (six) hours as needed.      ALPRAZolam (XANAX) 1 MG tablet Take 1 mg by mouth 2 (two) times daily as needed.      aspirin (ECOTRIN) 81 MG EC tablet Take 1 tablet (81 mg total) by mouth 2 (two) times a day. 60 tablet 0    atorvastatin (LIPITOR) 20 MG tablet Take 20 mg by mouth every evening.      busPIRone (BUSPAR) 5 MG Tab Take 1 tablet (5 mg total) by mouth 3 (three) times daily before meals. 90 tablet 2    gabapentin (NEURONTIN) 300 MG capsule Take 1 capsule (300 mg total) by mouth 3 (three) times daily before meals. 90 capsule 2     No current facility-administered  medications on file prior to visit.

## (undated) DEVICE — SOL LR 1000ML

## (undated) DEVICE — APPL COTN TP PLSTC 6IN STRL LF PK/2

## (undated) DEVICE — ELECTRODE,ECG,FOAM, 3/PK: Brand: MEDLINE

## (undated) DEVICE — APPLICATOR CHLORAPREP ORN 26ML

## (undated) DEVICE — DEVICE CLSR PDS PLUS 1 CT 18IN

## (undated) DEVICE — COVER FULLGUARD SHOE HIGH-TOP

## (undated) DEVICE — SUT CTD VICRYL CT-1 27

## (undated) DEVICE — SPONGE GAUZE 16PLY 4X4

## (undated) DEVICE — DRAPE TOP 53X102IN

## (undated) DEVICE — CANNULA,ADULT,SOFT-TOUCH,7'TUBE,UC: Brand: PENDING

## (undated) DEVICE — STAPLER SKIN PROXIMATE WIDE

## (undated) DEVICE — SYR SLPTP 20CC

## (undated) DEVICE — GLOVE PROTEXIS LTX MICRO 8

## (undated) DEVICE — ELECTRODE REM POLYHESIVE II

## (undated) DEVICE — TAPE SILK 3IN

## (undated) DEVICE — BANDAGE ACE DOUBLE STER 6IN

## (undated) DEVICE — BIT DRILL 4.2MM 3 FLUTD 145MM

## (undated) DEVICE — GLOVE SENSICARE PI GRN 6.5

## (undated) DEVICE — DRAPE C-ARMOR EQUIPMENT COVER

## (undated) DEVICE — IMPLANTABLE DEVICE
Type: IMPLANTABLE DEVICE | Site: FEMUR | Status: NON-FUNCTIONAL
Removed: 2024-10-22

## (undated) DEVICE — SUT ABSRB MONO 2-0 CT-2 27IN

## (undated) DEVICE — SPEC CONT WIDE MOUTH 3OZ 400/CS 20 CS/SK: Brand: MEDEGEN MEDICAL PRODUCTS, LLC

## (undated) DEVICE — GLOVE 8 PROTEXIS PI ORTHO

## (undated) DEVICE — SUT VICRYL PLUS ANTIBACT

## (undated) DEVICE — Device

## (undated) DEVICE — SENSR O2 OXIMAX FNGR A/ 18IN NONSTR

## (undated) DEVICE — DRESSING XEROFORM 5X9IN

## (undated) DEVICE — BOWL UTIL STRL 32OZ

## (undated) DEVICE — SOL IRR NACL 0.9PCT BT 1000ML

## (undated) DEVICE — DRAPE ORTH SPLIT 77X108IN

## (undated) DEVICE — PADDDING CAST WEBRIL 4YDX3IN

## (undated) DEVICE — COVER TABLE HVY DTY 60X90IN

## (undated) DEVICE — SYR SLP TP 10ML DISP

## (undated) DEVICE — SINGLE USE BIOPSY VALVE MAJ-210: Brand: SINGLE USE BIOPSY VALVE (STERILE)

## (undated) DEVICE — COVER SHOE FLUID RESISTANT XL

## (undated) DEVICE — BIT DRILL XLN 4.2MM

## (undated) DEVICE — DRESSING ADH ISLAND 2.5 X 3

## (undated) DEVICE — GLOVE SIGNATURE MICRO LTX 6.5

## (undated) DEVICE — GOWN SMARTSLEEVE AAMI LVL4 XL

## (undated) DEVICE — CONN STD FOR/O2 TBG

## (undated) DEVICE — COL SPUTUM SYS 50ML

## (undated) DEVICE — WIRE GUIDE 3.2MM 400MM
Type: IMPLANTABLE DEVICE | Site: FEMUR | Status: NON-FUNCTIONAL
Removed: 2024-10-22

## (undated) DEVICE — DRAPE STERI U-SHAPED 47X51IN

## (undated) DEVICE — PAD CAST 6X4YD SPECIALISTIC

## (undated) DEVICE — NEB SETUP SD STREAM W TBG

## (undated) DEVICE — TRAP,MUCUS SPECIMEN,40CC: Brand: MEDLINE

## (undated) DEVICE — SINGLE USE SUCTION VALVE MAJ-209: Brand: SINGLE USE SUCTION VALVE (STERILE)